# Patient Record
Sex: MALE | Race: WHITE | NOT HISPANIC OR LATINO | Employment: OTHER | URBAN - METROPOLITAN AREA
[De-identification: names, ages, dates, MRNs, and addresses within clinical notes are randomized per-mention and may not be internally consistent; named-entity substitution may affect disease eponyms.]

---

## 2017-01-10 ENCOUNTER — OFFICE VISIT (OUTPATIENT)
Dept: FAMILY MEDICINE CLINIC | Facility: CLINIC | Age: 82
End: 2017-01-10

## 2017-01-10 VITALS
RESPIRATION RATE: 20 BRPM | DIASTOLIC BLOOD PRESSURE: 100 MMHG | TEMPERATURE: 99.1 F | WEIGHT: 235.6 LBS | HEIGHT: 74 IN | BODY MASS INDEX: 30.24 KG/M2 | HEART RATE: 88 BPM | SYSTOLIC BLOOD PRESSURE: 170 MMHG

## 2017-01-10 DIAGNOSIS — M17.0 OSTEOARTHRITIS OF BOTH KNEES, UNSPECIFIED OSTEOARTHRITIS TYPE: ICD-10-CM

## 2017-01-10 DIAGNOSIS — R20.0 NUMBNESS AND TINGLING OF HAND: ICD-10-CM

## 2017-01-10 DIAGNOSIS — I10 ESSENTIAL HYPERTENSION: ICD-10-CM

## 2017-01-10 DIAGNOSIS — C43.59 MALIGNANT MELANOMA OF TORSO EXCLUDING BREAST (HCC): ICD-10-CM

## 2017-01-10 DIAGNOSIS — R20.2 NUMBNESS AND TINGLING OF HAND: ICD-10-CM

## 2017-01-10 DIAGNOSIS — I50.9 CONGESTIVE HEART FAILURE, UNSPECIFIED CONGESTIVE HEART FAILURE CHRONICITY, UNSPECIFIED CONGESTIVE HEART FAILURE TYPE: ICD-10-CM

## 2017-01-10 DIAGNOSIS — C44.91 BASAL CELL CARCINOMA: ICD-10-CM

## 2017-01-10 DIAGNOSIS — I82.4Y1 DEEP VEIN THROMBOSIS (DVT) OF PROXIMAL VEIN OF RIGHT LOWER EXTREMITY, UNSPECIFIED CHRONICITY (HCC): ICD-10-CM

## 2017-01-10 DIAGNOSIS — L84 PRE-ULCERATIVE CORN OR CALLOUS: ICD-10-CM

## 2017-01-10 DIAGNOSIS — Z76.89 ENCOUNTER TO ESTABLISH CARE: Primary | ICD-10-CM

## 2017-01-10 PROCEDURE — 99204 OFFICE O/P NEW MOD 45 MIN: CPT | Performed by: NURSE PRACTITIONER

## 2017-01-10 RX ORDER — FUROSEMIDE 20 MG/1
20 TABLET ORAL 2 TIMES DAILY
Qty: 120 TABLET | Refills: 1 | Status: SHIPPED | OUTPATIENT
Start: 2017-01-10 | End: 2017-06-06 | Stop reason: SDUPTHER

## 2017-01-10 RX ORDER — SIMVASTATIN 5 MG
5 TABLET ORAL NIGHTLY
Qty: 90 TABLET | Refills: 1 | Status: SHIPPED | OUTPATIENT
Start: 2017-01-10 | End: 2017-06-06 | Stop reason: SDUPTHER

## 2017-01-10 RX ORDER — ASPIRIN 81 MG/1
81 TABLET ORAL DAILY
COMMUNITY
End: 2017-01-10 | Stop reason: SDUPTHER

## 2017-01-10 RX ORDER — FUROSEMIDE 20 MG/1
20 TABLET ORAL 2 TIMES DAILY
COMMUNITY
End: 2017-01-10 | Stop reason: SDUPTHER

## 2017-01-10 RX ORDER — ACETAMINOPHEN 500 MG
500 TABLET ORAL EVERY 6 HOURS PRN
COMMUNITY
End: 2017-05-18

## 2017-01-10 RX ORDER — ASPIRIN 81 MG/1
81 TABLET ORAL DAILY
Qty: 90 TABLET | Refills: 1 | Status: SHIPPED | OUTPATIENT
Start: 2017-01-10 | End: 2017-05-18

## 2017-01-10 RX ORDER — SIMVASTATIN 5 MG
5 TABLET ORAL NIGHTLY
COMMUNITY
End: 2017-01-10 | Stop reason: SDUPTHER

## 2017-01-10 NOTE — PROGRESS NOTES
Subjective   Calderon Scott is a 84 y.o. male.     History of Present Illness   NP to establish care  85 yo male accompanied by his son just moved from [place], living with niece    HTN, CHF, HLP, hx of DVT RLE, hyperglycemia, osteoarthritis (chronic knee pains) melanoma, basal cell carcinoma, callous on right foot  Weight loss of 200 lbs over the past year, intentional with dietary changes    Needs referral to Podiatrist, Dermatologist, Orthopedist  Numbness in 1,2,3 digits of right hand, trouble holding onto things, not sure of cause  No accident or injury no wrist pain or elbow pain no neck pain  HTN  Stable on meds but has run out of some of his BP meds  Takes Lasix for CHF has resolved according to son and pt was told did not need to see Cardiologist any longer since lost weight    DVT in RLE resolved, no longer on Coumadin, says he didn't tolerate it the med made him have dizziness and falling spells, takes aspirin daily    Skin cancer: Basal cell and melanoma on chest    Bilateral knee pain, bone on bone, needs knee replacement but was told would not do surgery without him losing weight     The following portions of the patient's history were reviewed and updated as appropriate: allergies, current medications, past family history, past medical history, past social history, past surgical history and problem list.    Review of Systems   Constitutional: Negative for activity change and unexpected weight change.   HENT: Negative.    Eyes: Negative.  Negative for visual disturbance.   Respiratory: Negative.  Negative for cough, chest tightness and shortness of breath.    Cardiovascular: Negative.  Negative for chest pain, palpitations and leg swelling.   Gastrointestinal: Negative.    Endocrine: Negative.    Genitourinary: Negative.    Musculoskeletal: Positive for arthralgias and gait problem.   Skin: Positive for color change.   Allergic/Immunologic: Negative.    Neurological: Positive for numbness (right  hand). Negative for dizziness, light-headedness and headaches.   Hematological: Negative.    Psychiatric/Behavioral: Negative.  Negative for confusion, decreased concentration and sleep disturbance.       Objective   Physical Exam   Constitutional: He is oriented to person, place, and time. He appears well-developed and well-nourished. He does not appear ill. No distress.   HENT:   Head: Normocephalic.   Right Ear: Hearing and external ear normal.   Left Ear: Hearing and external ear normal.   Nose: Nose normal.   Eyes: Conjunctivae and lids are normal. Pupils are equal, round, and reactive to light.   Neck: Normal range of motion. Neck supple. No JVD present. Carotid bruit is not present. No thyroid mass and no thyromegaly present.   Cardiovascular: Normal rate, regular rhythm, S1 normal, S2 normal, normal heart sounds and intact distal pulses.    Pulmonary/Chest: Effort normal and breath sounds normal.   Abdominal: Soft.   Musculoskeletal: He exhibits no edema, tenderness or deformity.        Right knee: He exhibits no swelling.        Left knee: He exhibits no swelling.   Lymphadenopathy:     He has no cervical adenopathy.   Neurological: He is alert and oriented to person, place, and time. He has normal reflexes.   Skin: Skin is warm, dry and intact. No cyanosis. Nails show no clubbing.   Psychiatric: He has a normal mood and affect. His behavior is normal. Judgment and thought content normal.   Nursing note and vitals reviewed.      Assessment/Plan   Calderon was seen today for establish care.    Diagnoses and all orders for this visit:    Encounter to establish care    Essential hypertension  -     furosemide (LASIX) 20 MG tablet; Take 1 tablet by mouth 2 (Two) Times a Day.  -     metoprolol tartrate (LOPRESSOR) 25 MG tablet; Take 1 tablet by mouth 2 (Two) Times a Day.    Congestive heart failure, unspecified congestive heart failure chronicity, unspecified congestive heart failure type    Osteoarthritis of both  knees, unspecified osteoarthritis type  -     Ambulatory Referral to Orthopedic Surgery    Basal cell carcinoma    Malignant melanoma of torso excluding breast    Numbness and tingling of hand  -     EMG & Nerve Conduction Test; Future    Deep vein thrombosis (DVT) of proximal vein of right lower extremity, unspecified chronicity    Pre-ulcerative corn or callous  -     Ambulatory Referral to Podiatry    Other orders  -     simvastatin (ZOCOR) 5 MG tablet; Take 1 tablet by mouth Every Night.  -     aspirin 81 MG EC tablet; Take 1 tablet by mouth Daily.        Will make referrals to Orthopedic and Podiatry  Will refer to Dermatology at next visit  Will refill BP meds  Will refer pt for EMG/NCT of right upper extremity  Will request records from previous PCP   F/u 3 months and will do labs at that time

## 2017-01-10 NOTE — MR AVS SNAPSHOT
Calderon Scott   1/10/2017 12:00 PM   Office Visit    Dept Phone:  [phone]   Encounter #:  [id number]    Provider:  BAL Ornelas   Department:  [facility]                Your Full Care Plan              Where to Get Your Medications      These medications were sent to [facility]/pharmacy #[id number] - [place], [place] - [address] [place] [address] AT CORNER OF [facility] - [phone]  - [phone] [address] [address]    Hours:  24-hours Phone:  [phone]     aspirin 81 MG EC tablet    furosemide 20 MG tablet    metoprolol tartrate 25 MG tablet    simvastatin 5 MG tablet            Your Updated Medication List          This list is accurate as of: 1/10/17 12:55 PM.  Always use your most recent med list.                acetaminophen 500 MG tablet   Commonly known as:  TYLENOL       aspirin 81 MG EC tablet   Take 1 tablet by mouth Daily.       CALCIUM 600 + D PO       furosemide 20 MG tablet   Commonly known as:  LASIX   Take 1 tablet by mouth 2 (Two) Times a Day.       GLUCOSAMINE CHONDR 1500 COMPLX PO       metoprolol tartrate 25 MG tablet   Commonly known as:  LOPRESSOR   Take 1 tablet by mouth 2 (Two) Times a Day.       MULTIVITAMIN ADULT PO       simvastatin 5 MG tablet   Commonly known as:  ZOCOR   Take 1 tablet by mouth Every Night.               We Performed the Following     Ambulatory Referral to Orthopedic Surgery     Ambulatory Referral to Podiatry       You Were Diagnosed With        Codes Comments    Encounter to establish care    -  Primary ICD-10-CM: Z76.89  ICD-9-CM: V65.8     Essential hypertension     ICD-10-CM: I10  ICD-9-CM: 401.9     Osteoarthritis of both knees, unspecified osteoarthritis type     ICD-10-CM: M17.0  ICD-9-CM: 715.96     Basal cell carcinoma     ICD-10-CM: C44.91  ICD-9-CM: 173.91     Malignant melanoma of torso excluding breast     ICD-10-CM: C43.59  ICD-9-CM: 172.5     Deep vein thrombosis (DVT) of  "proximal vein of right lower extremity, unspecified chronicity     ICD-10-CM: I82.4Y1  ICD-9-CM: 453.41     Numbness and tingling of hand     ICD-10-CM: R20.2, R20.0  ICD-9-CM: 782.0     Pre-ulcerative corn or callous     ICD-10-CM: L84  ICD-9-CM: 700       Instructions     None    Patient Instructions History      Upcoming Appointments     Visit Type Date Time Department    NEW PATIENT 1/10/2017 12:00 PM [facility]    FOLLOW UP 2017 11:30 AM [facility]      LDL Technology Signup     [facility] LDL Technology allows you to send messages to your doctor, view your test results, renew your prescriptions, schedule appointments, and more. To sign up, go to [employer] and click on the Sign Up Now link in the New User? box. Enter your [employer] Technology Activation Code exactly as it appears below along with the last four digits of your Social Security Number and your Date of Birth () to complete the sign-up process. If you do not sign up before the expiration date, you must request a new code.    [employer] Technology Activation Code: [email or web address]  Expires: 2017 12:54 PM    If you have questions, you can email [email or web address] or call [phone] to talk to our LDL Technology staff. Remember, LDL Technology is NOT to be used for urgent needs. For medical emergencies, dial 911.               Other Info from Your Visit           Your Appointments     2017 11:30 AM EDT   Follow Up with BAL Ornelas   [facility] (--)    [address]  [facility] [address] [phone]           Arrive 15 minutes prior to appointment.              Allergies     Penicillins        Reason for Visit     Establish Care He just moved here from [place]      Vital Signs     Blood Pressure Pulse Temperature Respirations Height Weight    170/100 88 99.1 °F (37.3 °C) 20 74\" (188 cm) 235 lb 9.6 oz (107 kg)    Body Mass Index Smoking Status                30.25 kg/m2 Never Assessed "          Problems and Diagnoses Noted     Encounter to establish care    -  Primary    High blood pressure        Osteoarthritis of both knees, unspecified osteoarthritis type        Skin cancer        Malignant melanoma of torso excluding breast        Deep vein thrombosis (DVT) of proximal vein of right lower extremity, unspecified chronicity        Numbness and tingling of hand        Pre-ulcerative corn or callous

## 2017-01-17 ENCOUNTER — PROCEDURE VISIT (OUTPATIENT)
Dept: NEUROLOGY | Facility: CLINIC | Age: 82
End: 2017-01-17

## 2017-01-17 DIAGNOSIS — G56.01 RIGHT CARPAL TUNNEL SYNDROME: ICD-10-CM

## 2017-01-17 PROCEDURE — 95908 NRV CNDJ TST 3-4 STUDIES: CPT | Performed by: PSYCHIATRY & NEUROLOGY

## 2017-01-17 PROCEDURE — 95886 MUSC TEST DONE W/N TEST COMP: CPT | Performed by: PSYCHIATRY & NEUROLOGY

## 2017-01-17 NOTE — MR AVS SNAPSHOT
Calderon Scott   2017 2:00 PM   Procedure visit    Dept Phone:  [phone]   Encounter #:  [id number]    Provider:  Jesus Mcmahan MD   Department:  [facility] NEUROLOGY                Your Full Care Plan              Your Updated Medication List          This list is accurate as of: 17  3:11 PM.  Always use your most recent med list.                acetaminophen 500 MG tablet   Commonly known as:  TYLENOL       aspirin 81 MG EC tablet   Take 1 tablet by mouth Daily.       CALCIUM 600 + D PO       furosemide 20 MG tablet   Commonly known as:  LASIX   Take 1 tablet by mouth 2 (Two) Times a Day.       GLUCOSAMINE CHONDR 1500 COMPLX PO       metoprolol tartrate 25 MG tablet   Commonly known as:  LOPRESSOR   Take 1 tablet by mouth 2 (Two) Times a Day.       MULTIVITAMIN ADULT PO       simvastatin 5 MG tablet   Commonly known as:  ZOCOR   Take 1 tablet by mouth Every Night.               You Were Diagnosed With        Codes Comments    Right carpal tunnel syndrome     ICD-10-CM: G56.01  ICD-9-CM: 354.0       Instructions     None    Patient Instructions History      Upcoming Appointments     Visit Type Date Time Department    EMG 2017  2:00 PM [facility]    FOLLOW UP 2017 11:30 AM [facility]      Easy Ice Signup     [facility] Easy Ice allows you to send messages to your doctor, view your test results, renew your prescriptions, schedule appointments, and more. To sign up, go to [employer] and click on the Sign Up Now link in the New User? box. Enter your [employer] Activation Code exactly as it appears below along with the last four digits of your Social Security Number and your Date of Birth () to complete the sign-up process. If you do not sign up before the expiration date, you must request a new code.    [employer] Activation Code: [email or web address]  Expires: 2017 12:54 PM    If you have questions, you can  email [email or web address] or call [phone] to talk to our MyChart staff. Remember, "[employer]hart is NOT to be used for urgent needs. For medical emergencies, dial 911.               Other Info from Your Visit           Your Appointments     Apr 11, 2017 11:30 AM EDT   Follow Up with BAL Ornelas   [facility] (--)    [address] [facility] [address]  [facility] [address] [phone]           Arrive 15 minutes prior to appointment.              Allergies     Penicillins        Vital Signs     Smoking Status                   Never Assessed           Problems and Diagnoses Noted     Carpal tunnel syndrome on right

## 2017-05-17 ENCOUNTER — TELEPHONE (OUTPATIENT)
Dept: FAMILY MEDICINE CLINIC | Facility: CLINIC | Age: 82
End: 2017-05-17

## 2017-05-18 ENCOUNTER — OFFICE VISIT (OUTPATIENT)
Dept: FAMILY MEDICINE CLINIC | Facility: CLINIC | Age: 82
End: 2017-05-18

## 2017-05-18 ENCOUNTER — TELEPHONE (OUTPATIENT)
Dept: FAMILY MEDICINE CLINIC | Facility: CLINIC | Age: 82
End: 2017-05-18

## 2017-05-18 VITALS
DIASTOLIC BLOOD PRESSURE: 70 MMHG | BODY MASS INDEX: 36.45 KG/M2 | HEIGHT: 74 IN | TEMPERATURE: 98.5 F | RESPIRATION RATE: 24 BRPM | HEART RATE: 76 BPM | SYSTOLIC BLOOD PRESSURE: 120 MMHG | WEIGHT: 284 LBS

## 2017-05-18 DIAGNOSIS — I48.20 CHRONIC A-FIB (HCC): ICD-10-CM

## 2017-05-18 DIAGNOSIS — I10 ESSENTIAL HYPERTENSION: Primary | ICD-10-CM

## 2017-05-18 DIAGNOSIS — M48.061 SPINAL STENOSIS OF LUMBAR REGION: ICD-10-CM

## 2017-05-18 DIAGNOSIS — I50.9 CONGESTIVE HEART FAILURE, UNSPECIFIED CONGESTIVE HEART FAILURE CHRONICITY, UNSPECIFIED CONGESTIVE HEART FAILURE TYPE: ICD-10-CM

## 2017-05-18 DIAGNOSIS — R29.898 WEAKNESS OF LEFT LOWER EXTREMITY: ICD-10-CM

## 2017-05-18 PROBLEM — Z76.89 ENCOUNTER TO ESTABLISH CARE: Status: RESOLVED | Noted: 2017-01-10 | Resolved: 2017-05-18

## 2017-05-18 LAB
ALBUMIN SERPL-MCNC: 2.7 G/DL (ref 3.2–4.8)
ALBUMIN/GLOB SERPL: 0.9 G/DL (ref 1.5–2.5)
ALP SERPL-CCNC: 86 U/L (ref 25–100)
ALT SERPL-CCNC: 19 U/L (ref 7–40)
AST SERPL-CCNC: 30 U/L (ref 0–33)
BASOPHILS # BLD AUTO: 0.02 10*3/MM3 (ref 0–0.2)
BASOPHILS NFR BLD AUTO: 0.2 % (ref 0–1)
BILIRUB SERPL-MCNC: 0.6 MG/DL (ref 0.3–1.2)
BUN SERPL-MCNC: 22 MG/DL (ref 9–23)
BUN/CREAT SERPL: 31.4 (ref 7–25)
CALCIUM SERPL-MCNC: 8.5 MG/DL (ref 8.7–10.4)
CHLORIDE SERPL-SCNC: 99 MMOL/L (ref 99–109)
CO2 SERPL-SCNC: 34 MMOL/L (ref 20–31)
CREAT SERPL-MCNC: 0.7 MG/DL (ref 0.6–1.3)
EOSINOPHIL # BLD AUTO: 0.04 10*3/MM3 (ref 0.1–0.3)
EOSINOPHIL NFR BLD AUTO: 0.5 % (ref 0–3)
ERYTHROCYTE [DISTWIDTH] IN BLOOD BY AUTOMATED COUNT: 18.3 % (ref 11.3–14.5)
GLOBULIN SER CALC-MCNC: 2.9 GM/DL
GLUCOSE SERPL-MCNC: 127 MG/DL (ref 70–100)
HCT VFR BLD AUTO: 34.8 % (ref 38.9–50.9)
HGB BLD-MCNC: 10.5 G/DL (ref 13.1–17.5)
IMM GRANULOCYTES # BLD: 0.04 10*3/MM3 (ref 0–0.03)
IMM GRANULOCYTES NFR BLD: 0.5 % (ref 0–0.6)
LYMPHOCYTES # BLD AUTO: 0.82 10*3/MM3 (ref 0.6–4.8)
LYMPHOCYTES NFR BLD AUTO: 9.4 % (ref 24–44)
MCH RBC QN AUTO: 24.5 PG (ref 27–31)
MCHC RBC AUTO-ENTMCNC: 30.2 G/DL (ref 32–36)
MCV RBC AUTO: 81.3 FL (ref 80–99)
MONOCYTES # BLD AUTO: 1 10*3/MM3 (ref 0–1)
MONOCYTES NFR BLD AUTO: 11.5 % (ref 0–12)
NEUTROPHILS # BLD AUTO: 6.8 10*3/MM3 (ref 1.5–8.3)
NEUTROPHILS NFR BLD AUTO: 77.9 % (ref 41–71)
PLATELET # BLD AUTO: 202 10*3/MM3 (ref 150–450)
POTASSIUM SERPL-SCNC: 4 MMOL/L (ref 3.5–5.5)
PROT SERPL-MCNC: 5.6 G/DL (ref 5.7–8.2)
RBC # BLD AUTO: 4.28 10*6/MM3 (ref 4.2–5.76)
SODIUM SERPL-SCNC: 134 MMOL/L (ref 132–146)
WBC # BLD AUTO: 8.72 10*3/MM3 (ref 3.5–10.8)

## 2017-05-18 PROCEDURE — 99214 OFFICE O/P EST MOD 30 MIN: CPT | Performed by: NURSE PRACTITIONER

## 2017-05-18 RX ORDER — OXYCODONE HYDROCHLORIDE 5 MG/1
TABLET ORAL
COMMUNITY
Start: 2017-05-11 | End: 2017-06-20

## 2017-05-18 RX ORDER — METOPROLOL TARTRATE 50 MG/1
25 TABLET, FILM COATED ORAL 2 TIMES DAILY
COMMUNITY
Start: 2017-05-11 | End: 2017-06-06 | Stop reason: SDUPTHER

## 2017-05-18 RX ORDER — RIVAROXABAN 20 MG/1
20 TABLET, FILM COATED ORAL DAILY
COMMUNITY
Start: 2017-05-11 | End: 2017-06-06 | Stop reason: SDUPTHER

## 2017-05-18 RX ORDER — MELATONIN
1000 2 TIMES DAILY
COMMUNITY
End: 2017-06-06 | Stop reason: SDUPTHER

## 2017-05-18 RX ORDER — METHENAMINE HIPPURATE 1000 MG/1
1 TABLET ORAL 2 TIMES DAILY
COMMUNITY
Start: 2017-05-11 | End: 2017-06-06 | Stop reason: SDUPTHER

## 2017-05-18 RX ORDER — NAPROXEN 500 MG/1
TABLET ORAL
COMMUNITY
Start: 2017-05-11 | End: 2017-05-18

## 2017-05-18 RX ORDER — GABAPENTIN 400 MG/1
1 CAPSULE ORAL 3 TIMES DAILY
COMMUNITY
Start: 2017-05-11 | End: 2017-06-06 | Stop reason: SDUPTHER

## 2017-05-18 RX ORDER — TAMSULOSIN HYDROCHLORIDE 0.4 MG/1
0.4 CAPSULE ORAL DAILY
COMMUNITY
Start: 2017-05-11 | End: 2017-06-06 | Stop reason: SDUPTHER

## 2017-05-18 RX ORDER — LISINOPRIL 10 MG/1
10 TABLET ORAL DAILY
COMMUNITY
Start: 2017-05-11 | End: 2017-06-06 | Stop reason: SDUPTHER

## 2017-05-18 RX ORDER — PEPSIN/PAN ENZYME/BETAINE
1 TABLET ORAL DAILY
COMMUNITY

## 2017-05-18 RX ORDER — FAMOTIDINE 20 MG/1
20 TABLET, FILM COATED ORAL DAILY
COMMUNITY
Start: 2017-05-11 | End: 2017-06-06 | Stop reason: SDUPTHER

## 2017-05-18 RX ORDER — CALCIUM CARBONATE 200(500)MG
1 TABLET,CHEWABLE ORAL DAILY
COMMUNITY

## 2017-05-18 RX ORDER — CITALOPRAM 20 MG/1
20 TABLET ORAL ONCE
COMMUNITY
Start: 2017-05-11 | End: 2017-06-06 | Stop reason: SDUPTHER

## 2017-05-18 RX ORDER — FINASTERIDE 5 MG/1
5 TABLET, FILM COATED ORAL DAILY
COMMUNITY
Start: 2017-05-11 | End: 2017-06-06 | Stop reason: SDUPTHER

## 2017-05-18 RX ORDER — POTASSIUM CHLORIDE 750 MG/1
10 TABLET, FILM COATED, EXTENDED RELEASE ORAL 2 TIMES DAILY
COMMUNITY
Start: 2017-05-11 | End: 2017-06-06 | Stop reason: SDUPTHER

## 2017-05-19 ENCOUNTER — TELEPHONE (OUTPATIENT)
Dept: FAMILY MEDICINE CLINIC | Facility: CLINIC | Age: 82
End: 2017-05-19

## 2017-05-23 ENCOUNTER — TELEPHONE (OUTPATIENT)
Dept: FAMILY MEDICINE CLINIC | Facility: CLINIC | Age: 82
End: 2017-05-23

## 2017-05-25 ENCOUNTER — TELEPHONE (OUTPATIENT)
Dept: FAMILY MEDICINE CLINIC | Facility: CLINIC | Age: 82
End: 2017-05-25

## 2017-05-31 RX ORDER — CIPROFLOXACIN 500 MG/1
500 TABLET, FILM COATED ORAL 2 TIMES DAILY
Qty: 28 TABLET | Refills: 0 | Status: SHIPPED | OUTPATIENT
Start: 2017-05-31 | End: 2017-08-02 | Stop reason: HOSPADM

## 2017-06-05 ENCOUNTER — TELEPHONE (OUTPATIENT)
Dept: FAMILY MEDICINE CLINIC | Facility: CLINIC | Age: 82
End: 2017-06-05

## 2017-06-05 DIAGNOSIS — I10 ESSENTIAL HYPERTENSION: ICD-10-CM

## 2017-06-05 NOTE — TELEPHONE ENCOUNTER
----- Message from Alicia Huffman sent at 6/5/2017 10:38 AM EDT -----  Contact: SHANNA DICKERSON  THE MEDICATION HE CAME HOME WITH FROM [facility] NEEDS REFILLS;    GRANDDAUGHTER SAID YOU HAD THE LIST OF MEDS, [facility] ONLY HAVE 1 MT. AND HE IS ABOUT OUT OF THOSE    ALSO SHE IS THE ONE THAT NEEDS TO BE CALLED    JOSÉ ANTONIO [phone]

## 2017-06-06 RX ORDER — FUROSEMIDE 20 MG/1
20 TABLET ORAL 2 TIMES DAILY
Qty: 120 TABLET | Refills: 1 | Status: SHIPPED | OUTPATIENT
Start: 2017-06-06 | End: 2017-08-02 | Stop reason: HOSPADM

## 2017-06-06 RX ORDER — SIMVASTATIN 5 MG
5 TABLET ORAL NIGHTLY
Qty: 90 TABLET | Refills: 1 | Status: SHIPPED | OUTPATIENT
Start: 2017-06-06 | End: 2017-08-11

## 2017-06-06 RX ORDER — MELATONIN
1000 2 TIMES DAILY
Qty: 180 TABLET | Refills: 1 | Status: SHIPPED | OUTPATIENT
Start: 2017-06-06 | End: 2017-12-12 | Stop reason: SDUPTHER

## 2017-06-06 RX ORDER — LISINOPRIL 10 MG/1
10 TABLET ORAL DAILY
Qty: 90 TABLET | Refills: 1 | Status: SHIPPED | OUTPATIENT
Start: 2017-06-06 | End: 2017-08-02 | Stop reason: HOSPADM

## 2017-06-06 RX ORDER — FAMOTIDINE 20 MG/1
20 TABLET, FILM COATED ORAL DAILY
Qty: 90 TABLET | Refills: 1 | Status: SHIPPED | OUTPATIENT
Start: 2017-06-06 | End: 2017-12-12 | Stop reason: SDUPTHER

## 2017-06-06 RX ORDER — TAMSULOSIN HYDROCHLORIDE 0.4 MG/1
0.4 CAPSULE ORAL DAILY
Qty: 90 CAPSULE | Refills: 1 | Status: SHIPPED | OUTPATIENT
Start: 2017-06-06 | End: 2017-12-12 | Stop reason: SDUPTHER

## 2017-06-06 RX ORDER — CITALOPRAM 20 MG/1
20 TABLET ORAL ONCE
Qty: 90 TABLET | Refills: 1 | Status: SHIPPED | OUTPATIENT
Start: 2017-06-06 | End: 2017-06-06

## 2017-06-06 RX ORDER — METOPROLOL TARTRATE 50 MG/1
50 TABLET, FILM COATED ORAL 2 TIMES DAILY
Qty: 180 TABLET | Refills: 1 | Status: SHIPPED | OUTPATIENT
Start: 2017-06-06 | End: 2017-12-12 | Stop reason: SDUPTHER

## 2017-06-06 RX ORDER — POTASSIUM CHLORIDE 750 MG/1
10 TABLET, FILM COATED, EXTENDED RELEASE ORAL 2 TIMES DAILY
Qty: 180 TABLET | Refills: 1 | Status: SHIPPED | OUTPATIENT
Start: 2017-06-06 | End: 2017-07-28 | Stop reason: SDUPTHER

## 2017-06-06 RX ORDER — METHENAMINE HIPPURATE 1000 MG/1
1 TABLET ORAL 2 TIMES DAILY
Qty: 180 TABLET | Refills: 1 | Status: SHIPPED | OUTPATIENT
Start: 2017-06-06

## 2017-06-06 RX ORDER — RIVAROXABAN 20 MG/1
20 TABLET, FILM COATED ORAL DAILY
Qty: 90 TABLET | Refills: 1 | Status: SHIPPED | OUTPATIENT
Start: 2017-06-06 | End: 2018-01-30 | Stop reason: SDUPTHER

## 2017-06-06 RX ORDER — GABAPENTIN 400 MG/1
400 CAPSULE ORAL 3 TIMES DAILY
Qty: 270 CAPSULE | Refills: 1 | Status: ON HOLD | OUTPATIENT
Start: 2017-06-06 | End: 2018-05-28

## 2017-06-06 RX ORDER — FINASTERIDE 5 MG/1
5 TABLET, FILM COATED ORAL DAILY
Qty: 90 TABLET | Refills: 1 | Status: SHIPPED | OUTPATIENT
Start: 2017-06-06 | End: 2017-12-12 | Stop reason: SDUPTHER

## 2017-06-12 ENCOUNTER — TELEPHONE (OUTPATIENT)
Dept: FAMILY MEDICINE CLINIC | Facility: CLINIC | Age: 82
End: 2017-06-12

## 2017-06-12 NOTE — TELEPHONE ENCOUNTER
----- Message from Alicia Huffman sent at 6/12/2017  3:49 PM EDT -----  Contact: [facility] CALLED TO INFORM THAT PATIENT WILL BE DISCHARGED FROM HOME HEALTH SERVICES FOR [facility] AS OF 6/4/17 DUE TO INSURANCE DENIAL    [phone]

## 2017-06-20 ENCOUNTER — TELEPHONE (OUTPATIENT)
Dept: FAMILY MEDICINE CLINIC | Facility: CLINIC | Age: 82
End: 2017-06-20

## 2017-06-20 DIAGNOSIS — M17.0 OSTEOARTHRITIS OF BOTH KNEES, UNSPECIFIED OSTEOARTHRITIS TYPE: Primary | ICD-10-CM

## 2017-06-20 DIAGNOSIS — M48.00 SPINAL STENOSIS, UNSPECIFIED SPINAL REGION: ICD-10-CM

## 2017-06-20 RX ORDER — OXYCODONE AND ACETAMINOPHEN 10; 325 MG/1; MG/1
TABLET ORAL
Qty: 30 TABLET | Refills: 0 | Status: SHIPPED | OUTPATIENT
Start: 2017-06-20 | End: 2017-07-28 | Stop reason: SDUPTHER

## 2017-06-20 NOTE — TELEPHONE ENCOUNTER
Spoke with  nurse Flaca; she will notify grand daughter Nya that pt may need to go to ER for evaluation due to swelling of upper and lower extremities. [facility]

## 2017-06-20 NOTE — TELEPHONE ENCOUNTER
----- Message from Kristin Sanabria sent at 6/20/2017  3:08 PM EDT -----  Contact: DR. IRVIN; HOME HEALTH CALLED   [facility] CALLED ABOUT [facility].SHE JUST GOT THERE AND HIS RIGHT ARM IS SWOLLEN AND RED. IT ISN'T WARM TO TOUCH BUT SHE CAN SEE THE SHININESS OF THE FLUID WITHIN HIS ARM. IT HAS GOT WORSE OVER THE WEEKEND. IT IS FROM HIS WRIST TO HIS SHOULDER. HE IS STILL HAVING SWELLING IN HIS FEET AND LEGS AS WELL.       CALL BACK [phone]

## 2017-06-20 NOTE — TELEPHONE ENCOUNTER
Please call.  Okay to .  I will give 30 at this time.  I have not seen him before however.  Please run Josesito.  Reviewed notes in chart.

## 2017-06-20 NOTE — TELEPHONE ENCOUNTER
Pt will need an appointment to be seen to evaluated and to have labs for the swelling.   Would recommend that he see Dr Ribeiro so he can become acquainted with him as he has been ordering some of his medications. [facility]

## 2017-06-20 NOTE — TELEPHONE ENCOUNTER
----- Message from Ximena Garcia MA sent at 6/20/2017 11:03 AM EDT -----  PER NOTE IN CHART ON 06/12/2017 WE REC'D CALL FROM  STATING PT WAS BEING DISCHARGED DUE TO INS. SPOKE WITH GRANDDAUGHTER WHO IS CARE GIVER AND SHE STATES THAT HE HAS APPT WITH UROLOGY TOMORROW AND WHEN HE WAS DISCHARGED FROM [facility] HE WAS GIVEN ONE MONTH SUPPLY OF ALL RX'S AND HE IS ALMOST OUT OF HIS PAIN MED WHICH IS OXYCODONE 10/325 PT TAKES 2 TIMES DAILY AND PRN. GRANDDAUGHTER ASKING IF YOU CAN REFILL THIS FOR HIM.

## 2017-06-21 DIAGNOSIS — R31.9 HEMATURIA: Primary | ICD-10-CM

## 2017-06-21 DIAGNOSIS — Z97.8 CHRONIC INDWELLING FOLEY CATHETER: ICD-10-CM

## 2017-06-21 DIAGNOSIS — R33.9 URINARY RETENTION: ICD-10-CM

## 2017-06-21 NOTE — TELEPHONE ENCOUNTER
SPOKE WITH GRANDDAUGHTER AND INFORMED HER OF RX AND OFFICE HRS GIVEN.   SHE STATES PT WENT TO ER THEY DONE LABS AND TEST AND STATES THAT THEY SAID NOTHING WAS FOUND ON U/S OF ARM AND THIS IS JUST EDEMA. REQUESTING INFO. SENDING THIS TO DR IRVIN SINCE HE SEEMS TO BE THE ONE HANDLING THIS PT AS HE HASN'T EVER SEEN HIM. INFORMED GRANDDAUGHTER PT SHOULD BE SEEN BY DR IRVIN AND SHE STATES THAT HE ISN'T MOBILE AND HH COMES THEY WILL SEE PT ON Friday AND SHE ALSO ASK IF LABS COULD BE ORDERED TO MAKE SURE PT MEDS ARE AT CORRECT DOSAGE. PLEASE ADVISE.

## 2017-06-21 NOTE — TELEPHONE ENCOUNTER
Sandra, can you review and order any labs you see fit. I have never seen him and looks like they are not able to get him here at this time. bds

## 2017-06-21 NOTE — TELEPHONE ENCOUNTER
[facility] ER records reviewed. Pt is being treated for possible cellulitis of upper extremity with Keflex QID for 7 days, no DVT per US. Labs showed normal kidney function and electrolytes. No LFTs or CBC or albumin level was checked at recent ER visit. (H&H and albumin have been low in past but care giver has been giving him Iron supplement and making sure he is getting enough protein daily).    Discussed with grand daughter Nya today that  Swelling of upper and lower extremities maybe due to CHF or due to low albumin levels or pt lack of mobility/venous stasis. Keep legs elevated as well as arms propped on pillows when sitting will help.     It is important that he keep appt with Cardiologist   Dr. Trujillo on 6/28/17 to have evaluation of Afib and CHF/swelling. She agrees to see to it that he keeps this appt and makes a follow up with Dr Ribeiro at some point for a face to face.     She will call back if needed. [facility]

## 2017-06-30 ENCOUNTER — TELEPHONE (OUTPATIENT)
Dept: FAMILY MEDICINE CLINIC | Facility: CLINIC | Age: 82
End: 2017-06-30

## 2017-07-05 ENCOUNTER — TELEPHONE (OUTPATIENT)
Dept: FAMILY MEDICINE CLINIC | Facility: CLINIC | Age: 82
End: 2017-07-05

## 2017-07-05 DIAGNOSIS — I50.9 CONGESTIVE HEART FAILURE, UNSPECIFIED CONGESTIVE HEART FAILURE CHRONICITY, UNSPECIFIED CONGESTIVE HEART FAILURE TYPE: Primary | ICD-10-CM

## 2017-07-05 DIAGNOSIS — I10 ESSENTIAL HYPERTENSION: ICD-10-CM

## 2017-07-05 DIAGNOSIS — I48.20 CHRONIC A-FIB (HCC): ICD-10-CM

## 2017-07-24 ENCOUNTER — TELEPHONE (OUTPATIENT)
Dept: FAMILY MEDICINE CLINIC | Facility: CLINIC | Age: 82
End: 2017-07-24

## 2017-07-24 NOTE — TELEPHONE ENCOUNTER
OK to irrigate catheter. Did not know it was an urgent issue. Did they want to change the catheter, if yes, ok to change. bds

## 2017-07-24 NOTE — TELEPHONE ENCOUNTER
----- Message from Alicia Huffman sent at 7/24/2017  4:42 PM EDT -----  Contact: [facility] CALLING RE:    HE IS HAVING PROBLEMS WITH THE CATATHER THEY HAVE IRRIGATED IT TODAY, SAT. AND Friday. . IT IS LEAKING HEAVILY    ASKING IF YOU WANT TO CHANGE THE ORDER    [phone]  JACK

## 2017-07-24 NOTE — TELEPHONE ENCOUNTER
Informed Calista that it is okay to change catheter and she requested if it was okay to change the size and Dr. Ribeiro gave verbal okay for that too.

## 2017-07-25 ENCOUNTER — TELEPHONE (OUTPATIENT)
Dept: FAMILY MEDICINE CLINIC | Facility: CLINIC | Age: 82
End: 2017-07-25

## 2017-07-25 NOTE — TELEPHONE ENCOUNTER
----- Message from Kristin Sanabria sent at 7/25/2017  3:27 PM EDT -----  Contact: DR. IRVIN; HOME HEALTH QUESTION   [facility] CALLED AND STATED THAT THE PT IS RUNNING A FEVER 99.6  BUT IT HAS GONE DOWN FROM LAST NIGHT ( 100.1) SHE WANTED TO KNOW IF A UA SHOULD BE DONE ON THE PT.     CALL BACK   [phone]   JOJO FROM [facility]

## 2017-07-28 ENCOUNTER — TELEPHONE (OUTPATIENT)
Dept: FAMILY MEDICINE CLINIC | Facility: CLINIC | Age: 82
End: 2017-07-28

## 2017-07-28 ENCOUNTER — HOSPITAL ENCOUNTER (OUTPATIENT)
Facility: HOSPITAL | Age: 82
Setting detail: OBSERVATION
Discharge: HOME HEALTH | End: 2017-08-02
Attending: EMERGENCY MEDICINE | Admitting: HOSPITALIST

## 2017-07-28 ENCOUNTER — APPOINTMENT (OUTPATIENT)
Dept: GENERAL RADIOLOGY | Facility: HOSPITAL | Age: 82
End: 2017-07-28

## 2017-07-28 DIAGNOSIS — E88.09 HYPOALBUMINEMIA: ICD-10-CM

## 2017-07-28 DIAGNOSIS — J90 PLEURAL EFFUSION, LEFT: ICD-10-CM

## 2017-07-28 DIAGNOSIS — M48.00 SPINAL STENOSIS, UNSPECIFIED SPINAL REGION: ICD-10-CM

## 2017-07-28 DIAGNOSIS — M17.0 OSTEOARTHRITIS OF BOTH KNEES, UNSPECIFIED OSTEOARTHRITIS TYPE: ICD-10-CM

## 2017-07-28 DIAGNOSIS — I50.9 CONGESTIVE HEART FAILURE, UNSPECIFIED CONGESTIVE HEART FAILURE CHRONICITY, UNSPECIFIED CONGESTIVE HEART FAILURE TYPE: ICD-10-CM

## 2017-07-28 DIAGNOSIS — Z74.09 IMPAIRED FUNCTIONAL MOBILITY, BALANCE, GAIT, AND ENDURANCE: ICD-10-CM

## 2017-07-28 DIAGNOSIS — N39.0 URINARY TRACT INFECTION ASSOCIATED WITH CATHETERIZATION OF URINARY TRACT, UNSPECIFIED INDWELLING URINARY CATHETER TYPE, INITIAL ENCOUNTER (HCC): Primary | ICD-10-CM

## 2017-07-28 DIAGNOSIS — T83.511A URINARY TRACT INFECTION ASSOCIATED WITH CATHETERIZATION OF URINARY TRACT, UNSPECIFIED INDWELLING URINARY CATHETER TYPE, INITIAL ENCOUNTER (HCC): Primary | ICD-10-CM

## 2017-07-28 LAB
ALBUMIN SERPL-MCNC: 2.8 G/DL (ref 3.2–4.8)
ALBUMIN/GLOB SERPL: 0.9 G/DL (ref 1.5–2.5)
ALP SERPL-CCNC: 137 U/L (ref 25–100)
ALT SERPL W P-5'-P-CCNC: 35 U/L (ref 7–40)
ANION GAP SERPL CALCULATED.3IONS-SCNC: 4 MMOL/L (ref 3–11)
AST SERPL-CCNC: 46 U/L (ref 0–33)
BACTERIA UR QL AUTO: ABNORMAL /HPF
BASOPHILS # BLD AUTO: 0.05 10*3/MM3 (ref 0–0.2)
BASOPHILS NFR BLD AUTO: 0.8 % (ref 0–1)
BILIRUB SERPL-MCNC: 0.3 MG/DL (ref 0.3–1.2)
BILIRUB UR QL STRIP: NEGATIVE
BUN BLD-MCNC: 30 MG/DL (ref 9–23)
BUN/CREAT SERPL: 50 (ref 7–25)
CALCIUM SPEC-SCNC: 7.9 MG/DL (ref 8.7–10.4)
CHLORIDE SERPL-SCNC: 103 MMOL/L (ref 99–109)
CLARITY UR: ABNORMAL
CO2 SERPL-SCNC: 29 MMOL/L (ref 20–31)
COLOR UR: YELLOW
CREAT BLD-MCNC: 0.6 MG/DL (ref 0.6–1.3)
D-LACTATE SERPL-SCNC: 1.3 MMOL/L (ref 0.5–2)
DEPRECATED RDW RBC AUTO: 58.2 FL (ref 37–54)
EOSINOPHIL # BLD AUTO: 0.08 10*3/MM3 (ref 0–0.3)
EOSINOPHIL NFR BLD AUTO: 1.3 % (ref 0–3)
ERYTHROCYTE [DISTWIDTH] IN BLOOD BY AUTOMATED COUNT: 19.7 % (ref 11.3–14.5)
GFR SERPL CREATININE-BSD FRML MDRD: 128 ML/MIN/1.73
GLOBULIN UR ELPH-MCNC: 3.1 GM/DL
GLUCOSE BLD-MCNC: 129 MG/DL (ref 70–100)
GLUCOSE UR STRIP-MCNC: NEGATIVE MG/DL
HCT VFR BLD AUTO: 41.8 % (ref 38.9–50.9)
HGB BLD-MCNC: 13.1 G/DL (ref 13.1–17.5)
HGB UR QL STRIP.AUTO: ABNORMAL
HYALINE CASTS UR QL AUTO: ABNORMAL /LPF
IMM GRANULOCYTES # BLD: 0.01 10*3/MM3 (ref 0–0.03)
IMM GRANULOCYTES NFR BLD: 0.2 % (ref 0–0.6)
KETONES UR QL STRIP: NEGATIVE
LEUKOCYTE ESTERASE UR QL STRIP.AUTO: ABNORMAL
LYMPHOCYTES # BLD AUTO: 1.42 10*3/MM3 (ref 0.6–4.8)
LYMPHOCYTES NFR BLD AUTO: 22.3 % (ref 24–44)
MCH RBC QN AUTO: 25 PG (ref 27–31)
MCHC RBC AUTO-ENTMCNC: 31.3 G/DL (ref 32–36)
MCV RBC AUTO: 79.8 FL (ref 80–99)
MONOCYTES # BLD AUTO: 0.8 10*3/MM3 (ref 0–1)
MONOCYTES NFR BLD AUTO: 12.6 % (ref 0–12)
NEUTROPHILS # BLD AUTO: 4 10*3/MM3 (ref 1.5–8.3)
NEUTROPHILS NFR BLD AUTO: 62.8 % (ref 41–71)
NITRITE UR QL STRIP: NEGATIVE
PH UR STRIP.AUTO: 6 [PH] (ref 5–8)
PLATELET # BLD AUTO: 232 10*3/MM3 (ref 150–450)
PMV BLD AUTO: 9.3 FL (ref 6–12)
POTASSIUM BLD-SCNC: 4.5 MMOL/L (ref 3.5–5.5)
PROCALCITONIN SERPL-MCNC: 0.06 NG/ML
PROT SERPL-MCNC: 5.9 G/DL (ref 5.7–8.2)
PROT UR QL STRIP: ABNORMAL
RBC # BLD AUTO: 5.24 10*6/MM3 (ref 4.2–5.76)
RBC # UR: ABNORMAL /HPF
REF LAB TEST METHOD: ABNORMAL
SODIUM BLD-SCNC: 136 MMOL/L (ref 132–146)
SP GR UR STRIP: 1.01 (ref 1–1.03)
SQUAMOUS #/AREA URNS HPF: ABNORMAL /HPF
UROBILINOGEN UR QL STRIP: ABNORMAL
WBC NRBC COR # BLD: 6.36 10*3/MM3 (ref 3.5–10.8)
WBC UR QL AUTO: ABNORMAL /HPF

## 2017-07-28 PROCEDURE — 84145 PROCALCITONIN (PCT): CPT | Performed by: PHYSICIAN ASSISTANT

## 2017-07-28 PROCEDURE — 83605 ASSAY OF LACTIC ACID: CPT | Performed by: PHYSICIAN ASSISTANT

## 2017-07-28 PROCEDURE — 99285 EMERGENCY DEPT VISIT HI MDM: CPT

## 2017-07-28 PROCEDURE — 85025 COMPLETE CBC W/AUTO DIFF WBC: CPT | Performed by: PHYSICIAN ASSISTANT

## 2017-07-28 PROCEDURE — 87186 SC STD MICRODIL/AGAR DIL: CPT | Performed by: PHYSICIAN ASSISTANT

## 2017-07-28 PROCEDURE — 81001 URINALYSIS AUTO W/SCOPE: CPT | Performed by: PHYSICIAN ASSISTANT

## 2017-07-28 PROCEDURE — 80053 COMPREHEN METABOLIC PANEL: CPT | Performed by: PHYSICIAN ASSISTANT

## 2017-07-28 PROCEDURE — 81003 URINALYSIS AUTO W/O SCOPE: CPT | Performed by: PHYSICIAN ASSISTANT

## 2017-07-28 PROCEDURE — 87040 BLOOD CULTURE FOR BACTERIA: CPT | Performed by: PHYSICIAN ASSISTANT

## 2017-07-28 PROCEDURE — 87086 URINE CULTURE/COLONY COUNT: CPT | Performed by: PHYSICIAN ASSISTANT

## 2017-07-28 PROCEDURE — 71010 HC CHEST PA OR AP: CPT

## 2017-07-28 PROCEDURE — 87077 CULTURE AEROBIC IDENTIFY: CPT | Performed by: PHYSICIAN ASSISTANT

## 2017-07-28 PROCEDURE — 51702 INSERT TEMP BLADDER CATH: CPT

## 2017-07-28 RX ORDER — OXYCODONE AND ACETAMINOPHEN 10; 325 MG/1; MG/1
TABLET ORAL
Qty: 18 TABLET | Refills: 0 | Status: SHIPPED | OUTPATIENT
Start: 2017-07-28 | End: 2017-08-08 | Stop reason: SDUPTHER

## 2017-07-28 RX ORDER — SODIUM CHLORIDE 0.9 % (FLUSH) 0.9 %
10 SYRINGE (ML) INJECTION AS NEEDED
Status: DISCONTINUED | OUTPATIENT
Start: 2017-07-28 | End: 2017-08-02 | Stop reason: HOSPADM

## 2017-07-28 RX ORDER — NITROFURANTOIN 25; 75 MG/1; MG/1
100 CAPSULE ORAL 2 TIMES DAILY
COMMUNITY
End: 2017-08-02 | Stop reason: HOSPADM

## 2017-07-28 RX ORDER — POTASSIUM CHLORIDE 750 MG/1
10 TABLET, FILM COATED, EXTENDED RELEASE ORAL 2 TIMES DAILY
Qty: 180 TABLET | Refills: 1 | Status: SHIPPED | OUTPATIENT
Start: 2017-07-28 | End: 2017-08-02 | Stop reason: HOSPADM

## 2017-07-28 NOTE — TELEPHONE ENCOUNTER
Pt needs refill of Percocet will you order? Grand daughter is coming by office to pick this up. He is using it sparingly but still needing it.     She says he is no longer febrile but he is having some urinary symptoms. She plans on taking him tomorrow to ER for treatment of UTI or sooner if he spikes a fever. jas

## 2017-07-29 PROBLEM — N39.0 URINARY TRACT INFECTION ASSOCIATED WITH CATHETERIZATION OF URINARY TRACT (HCC): Status: ACTIVE | Noted: 2017-07-29

## 2017-07-29 PROBLEM — T83.511A URINARY TRACT INFECTION ASSOCIATED WITH CATHETERIZATION OF URINARY TRACT (HCC): Status: ACTIVE | Noted: 2017-07-29

## 2017-07-29 LAB
BACTERIA UR QL AUTO: ABNORMAL /HPF
BILIRUB UR QL STRIP: NEGATIVE
CLARITY UR: ABNORMAL
COLOR UR: YELLOW
GLUCOSE UR STRIP-MCNC: NEGATIVE MG/DL
HGB UR QL STRIP.AUTO: ABNORMAL
HYALINE CASTS UR QL AUTO: ABNORMAL /LPF
KETONES UR QL STRIP: NEGATIVE
LEUKOCYTE ESTERASE UR QL STRIP.AUTO: ABNORMAL
NITRITE UR QL STRIP: NEGATIVE
PH UR STRIP.AUTO: <=5 [PH] (ref 5–8)
PROT UR QL STRIP: ABNORMAL
RBC # UR: ABNORMAL /HPF
REF LAB TEST METHOD: ABNORMAL
SP GR UR STRIP: 1.02 (ref 1–1.03)
SQUAMOUS #/AREA URNS HPF: ABNORMAL /HPF
UROBILINOGEN UR QL STRIP: ABNORMAL
WBC UR QL AUTO: ABNORMAL /HPF

## 2017-07-29 PROCEDURE — 87086 URINE CULTURE/COLONY COUNT: CPT | Performed by: PHYSICIAN ASSISTANT

## 2017-07-29 PROCEDURE — G0378 HOSPITAL OBSERVATION PER HR: HCPCS

## 2017-07-29 PROCEDURE — G8979 MOBILITY GOAL STATUS: HCPCS

## 2017-07-29 PROCEDURE — 81003 URINALYSIS AUTO W/O SCOPE: CPT | Performed by: PHYSICIAN ASSISTANT

## 2017-07-29 PROCEDURE — 99220 PR INITIAL OBSERVATION CARE/DAY 70 MINUTES: CPT | Performed by: INTERNAL MEDICINE

## 2017-07-29 PROCEDURE — 97161 PT EVAL LOW COMPLEX 20 MIN: CPT

## 2017-07-29 PROCEDURE — 81001 URINALYSIS AUTO W/SCOPE: CPT | Performed by: PHYSICIAN ASSISTANT

## 2017-07-29 PROCEDURE — 97110 THERAPEUTIC EXERCISES: CPT

## 2017-07-29 PROCEDURE — G8978 MOBILITY CURRENT STATUS: HCPCS

## 2017-07-29 RX ORDER — CEFDINIR 300 MG/1
300 CAPSULE ORAL 2 TIMES DAILY
Qty: 14 CAPSULE | Refills: 0 | Status: SHIPPED | OUTPATIENT
Start: 2017-07-29 | End: 2017-07-29 | Stop reason: HOSPADM

## 2017-07-29 RX ORDER — FAMOTIDINE 20 MG/1
20 TABLET, FILM COATED ORAL DAILY
Status: DISCONTINUED | OUTPATIENT
Start: 2017-07-29 | End: 2017-08-02 | Stop reason: HOSPADM

## 2017-07-29 RX ORDER — SACCHAROMYCES BOULARDII 250 MG
250 CAPSULE ORAL 2 TIMES DAILY
Status: DISCONTINUED | OUTPATIENT
Start: 2017-07-29 | End: 2017-08-02 | Stop reason: HOSPADM

## 2017-07-29 RX ORDER — GABAPENTIN 400 MG/1
400 CAPSULE ORAL 3 TIMES DAILY
Status: DISCONTINUED | OUTPATIENT
Start: 2017-07-29 | End: 2017-08-02 | Stop reason: HOSPADM

## 2017-07-29 RX ORDER — METOPROLOL TARTRATE 50 MG/1
50 TABLET, FILM COATED ORAL 2 TIMES DAILY
Status: DISCONTINUED | OUTPATIENT
Start: 2017-07-29 | End: 2017-08-02 | Stop reason: HOSPADM

## 2017-07-29 RX ORDER — TAMSULOSIN HYDROCHLORIDE 0.4 MG/1
0.4 CAPSULE ORAL DAILY
Status: DISCONTINUED | OUTPATIENT
Start: 2017-07-29 | End: 2017-08-02 | Stop reason: HOSPADM

## 2017-07-29 RX ORDER — MELATONIN
1000 2 TIMES DAILY
Status: DISCONTINUED | OUTPATIENT
Start: 2017-07-29 | End: 2017-08-02 | Stop reason: HOSPADM

## 2017-07-29 RX ORDER — ASCORBIC ACID 500 MG
500 TABLET ORAL DAILY
Status: DISCONTINUED | OUTPATIENT
Start: 2017-07-29 | End: 2017-08-02 | Stop reason: HOSPADM

## 2017-07-29 RX ORDER — ONDANSETRON 2 MG/ML
4 INJECTION INTRAMUSCULAR; INTRAVENOUS EVERY 6 HOURS PRN
Status: DISCONTINUED | OUTPATIENT
Start: 2017-07-29 | End: 2017-08-02 | Stop reason: HOSPADM

## 2017-07-29 RX ORDER — CEFDINIR 300 MG/1
300 CAPSULE ORAL EVERY 12 HOURS SCHEDULED
Status: DISCONTINUED | OUTPATIENT
Start: 2017-07-29 | End: 2017-07-30

## 2017-07-29 RX ORDER — CEFPODOXIME PROXETIL 100 MG/1
100 TABLET, FILM COATED ORAL EVERY 12 HOURS
Qty: 14 TABLET | Refills: 0 | Status: SHIPPED | OUTPATIENT
Start: 2017-07-29 | End: 2017-07-29 | Stop reason: HOSPADM

## 2017-07-29 RX ORDER — METHENAMINE HIPPURATE 1000 MG/1
1 TABLET ORAL 2 TIMES DAILY
Status: DISCONTINUED | OUTPATIENT
Start: 2017-07-29 | End: 2017-07-30

## 2017-07-29 RX ORDER — OXYCODONE AND ACETAMINOPHEN 10; 325 MG/1; MG/1
1 TABLET ORAL EVERY 6 HOURS PRN
Status: DISCONTINUED | OUTPATIENT
Start: 2017-07-29 | End: 2017-08-02 | Stop reason: HOSPADM

## 2017-07-29 RX ORDER — CALCIUM CARBONATE 200(500)MG
1 TABLET,CHEWABLE ORAL DAILY
Status: DISCONTINUED | OUTPATIENT
Start: 2017-07-29 | End: 2017-08-02 | Stop reason: HOSPADM

## 2017-07-29 RX ORDER — FINASTERIDE 5 MG/1
5 TABLET, FILM COATED ORAL DAILY
Status: DISCONTINUED | OUTPATIENT
Start: 2017-07-29 | End: 2017-08-02 | Stop reason: HOSPADM

## 2017-07-29 RX ADMIN — FINASTERIDE 5 MG: 5 TABLET, FILM COATED ORAL at 10:06

## 2017-07-29 RX ADMIN — RIVAROXABAN 20 MG: 20 TABLET, FILM COATED ORAL at 10:07

## 2017-07-29 RX ADMIN — CEFDINIR 300 MG: 300 CAPSULE ORAL at 10:07

## 2017-07-29 RX ADMIN — GABAPENTIN 400 MG: 400 CAPSULE ORAL at 17:21

## 2017-07-29 RX ADMIN — VITAMIN D, TAB 1000IU (100/BT) 1000 UNITS: 25 TAB at 17:21

## 2017-07-29 RX ADMIN — METHENAMINE HIPPURATE 1 G: 1 TABLET ORAL at 17:24

## 2017-07-29 RX ADMIN — METHENAMINE HIPPURATE 1 G: 1 TABLET ORAL at 10:05

## 2017-07-29 RX ADMIN — TAMSULOSIN HYDROCHLORIDE 0.4 MG: 0.4 CAPSULE ORAL at 10:05

## 2017-07-29 RX ADMIN — GABAPENTIN 400 MG: 400 CAPSULE ORAL at 10:07

## 2017-07-29 RX ADMIN — OXYCODONE HYDROCHLORIDE AND ACETAMINOPHEN 1 TABLET: 10; 325 TABLET ORAL at 10:06

## 2017-07-29 RX ADMIN — CEFDINIR 300 MG: 300 CAPSULE ORAL at 21:18

## 2017-07-29 RX ADMIN — GABAPENTIN 400 MG: 400 CAPSULE ORAL at 21:18

## 2017-07-29 RX ADMIN — METOPROLOL TARTRATE 50 MG: 50 TABLET, FILM COATED ORAL at 10:05

## 2017-07-29 RX ADMIN — FAMOTIDINE 20 MG: 20 TABLET ORAL at 10:06

## 2017-07-30 LAB
ALBUMIN SERPL-MCNC: 2.6 G/DL (ref 3.2–4.8)
ALBUMIN/GLOB SERPL: 0.9 G/DL (ref 1.5–2.5)
ALP SERPL-CCNC: 100 U/L (ref 25–100)
ALT SERPL W P-5'-P-CCNC: 19 U/L (ref 7–40)
ANION GAP SERPL CALCULATED.3IONS-SCNC: 0 MMOL/L (ref 3–11)
AST SERPL-CCNC: 19 U/L (ref 0–33)
BASOPHILS # BLD AUTO: 0.04 10*3/MM3 (ref 0–0.2)
BASOPHILS NFR BLD AUTO: 0.7 % (ref 0–1)
BILIRUB SERPL-MCNC: 0.3 MG/DL (ref 0.3–1.2)
BUN BLD-MCNC: 25 MG/DL (ref 9–23)
BUN/CREAT SERPL: 35.7 (ref 7–25)
CALCIUM SPEC-SCNC: 8.7 MG/DL (ref 8.7–10.4)
CHLORIDE SERPL-SCNC: 102 MMOL/L (ref 99–109)
CO2 SERPL-SCNC: 35 MMOL/L (ref 20–31)
CREAT BLD-MCNC: 0.7 MG/DL (ref 0.6–1.3)
CRP SERPL-MCNC: 5.03 MG/DL (ref 0–1)
DEPRECATED RDW RBC AUTO: 57.3 FL (ref 37–54)
EOSINOPHIL # BLD AUTO: 0.29 10*3/MM3 (ref 0–0.3)
EOSINOPHIL NFR BLD AUTO: 5.2 % (ref 0–3)
ERYTHROCYTE [DISTWIDTH] IN BLOOD BY AUTOMATED COUNT: 19.9 % (ref 11.3–14.5)
GFR SERPL CREATININE-BSD FRML MDRD: 107 ML/MIN/1.73
GLOBULIN UR ELPH-MCNC: 2.9 GM/DL
GLUCOSE BLD-MCNC: 108 MG/DL (ref 70–100)
HCT VFR BLD AUTO: 36.6 % (ref 38.9–50.9)
HGB BLD-MCNC: 11.4 G/DL (ref 13.1–17.5)
IMM GRANULOCYTES # BLD: 0.01 10*3/MM3 (ref 0–0.03)
IMM GRANULOCYTES NFR BLD: 0.2 % (ref 0–0.6)
LYMPHOCYTES # BLD AUTO: 1.3 10*3/MM3 (ref 0.6–4.8)
LYMPHOCYTES NFR BLD AUTO: 23.3 % (ref 24–44)
MCH RBC QN AUTO: 24.5 PG (ref 27–31)
MCHC RBC AUTO-ENTMCNC: 31.1 G/DL (ref 32–36)
MCV RBC AUTO: 78.7 FL (ref 80–99)
MONOCYTES # BLD AUTO: 0.89 10*3/MM3 (ref 0–1)
MONOCYTES NFR BLD AUTO: 15.9 % (ref 0–12)
NEUTROPHILS # BLD AUTO: 3.05 10*3/MM3 (ref 1.5–8.3)
NEUTROPHILS NFR BLD AUTO: 54.7 % (ref 41–71)
PLATELET # BLD AUTO: 189 10*3/MM3 (ref 150–450)
PMV BLD AUTO: 8.9 FL (ref 6–12)
POTASSIUM BLD-SCNC: 4.9 MMOL/L (ref 3.5–5.5)
PROT SERPL-MCNC: 5.5 G/DL (ref 5.7–8.2)
RBC # BLD AUTO: 4.65 10*6/MM3 (ref 4.2–5.76)
SODIUM BLD-SCNC: 137 MMOL/L (ref 132–146)
WBC NRBC COR # BLD: 5.58 10*3/MM3 (ref 3.5–10.8)

## 2017-07-30 PROCEDURE — G0378 HOSPITAL OBSERVATION PER HR: HCPCS

## 2017-07-30 PROCEDURE — 80053 COMPREHEN METABOLIC PANEL: CPT | Performed by: INTERNAL MEDICINE

## 2017-07-30 PROCEDURE — 85025 COMPLETE CBC W/AUTO DIFF WBC: CPT | Performed by: INTERNAL MEDICINE

## 2017-07-30 PROCEDURE — 99226 PR SBSQ OBSERVATION CARE/DAY 35 MINUTES: CPT | Performed by: INTERNAL MEDICINE

## 2017-07-30 PROCEDURE — 25010000002 CEFEPIME: Performed by: INTERNAL MEDICINE

## 2017-07-30 PROCEDURE — 86140 C-REACTIVE PROTEIN: CPT | Performed by: INTERNAL MEDICINE

## 2017-07-30 RX ADMIN — GABAPENTIN 400 MG: 400 CAPSULE ORAL at 17:17

## 2017-07-30 RX ADMIN — METOPROLOL TARTRATE 50 MG: 50 TABLET, FILM COATED ORAL at 17:18

## 2017-07-30 RX ADMIN — Medication 250 MG: at 17:17

## 2017-07-30 RX ADMIN — CEFDINIR 300 MG: 300 CAPSULE ORAL at 09:15

## 2017-07-30 RX ADMIN — VITAMIN D, TAB 1000IU (100/BT) 1000 UNITS: 25 TAB at 17:17

## 2017-07-30 RX ADMIN — VITAMIN D, TAB 1000IU (100/BT) 1000 UNITS: 25 TAB at 09:15

## 2017-07-30 RX ADMIN — CEFEPIME 2 G: 2 INJECTION, POWDER, FOR SOLUTION INTRAVENOUS at 23:45

## 2017-07-30 RX ADMIN — CEFEPIME 2 G: 2 INJECTION, POWDER, FOR SOLUTION INTRAVENOUS at 17:26

## 2017-07-30 RX ADMIN — Medication 250 MG: at 09:14

## 2017-07-30 RX ADMIN — FAMOTIDINE 20 MG: 20 TABLET ORAL at 09:15

## 2017-07-30 RX ADMIN — FINASTERIDE 5 MG: 5 TABLET, FILM COATED ORAL at 09:15

## 2017-07-30 RX ADMIN — GABAPENTIN 400 MG: 400 CAPSULE ORAL at 20:22

## 2017-07-30 RX ADMIN — METHENAMINE HIPPURATE 1 G: 1 TABLET ORAL at 09:14

## 2017-07-30 RX ADMIN — TAMSULOSIN HYDROCHLORIDE 0.4 MG: 0.4 CAPSULE ORAL at 09:15

## 2017-07-30 RX ADMIN — OXYCODONE HYDROCHLORIDE AND ACETAMINOPHEN 500 MG: 500 TABLET ORAL at 09:15

## 2017-07-30 RX ADMIN — METOPROLOL TARTRATE 50 MG: 50 TABLET, FILM COATED ORAL at 09:15

## 2017-07-30 RX ADMIN — OXYCODONE HYDROCHLORIDE AND ACETAMINOPHEN 1 TABLET: 10; 325 TABLET ORAL at 00:02

## 2017-07-30 RX ADMIN — GABAPENTIN 400 MG: 400 CAPSULE ORAL at 09:15

## 2017-07-31 LAB — BACTERIA SPEC AEROBE CULT: ABNORMAL

## 2017-07-31 PROCEDURE — G8989 SELF CARE D/C STATUS: HCPCS

## 2017-07-31 PROCEDURE — G8987 SELF CARE CURRENT STATUS: HCPCS

## 2017-07-31 PROCEDURE — G0378 HOSPITAL OBSERVATION PER HR: HCPCS

## 2017-07-31 PROCEDURE — 97597 DBRDMT OPN WND 1ST 20 CM/<: CPT

## 2017-07-31 PROCEDURE — G8988 SELF CARE GOAL STATUS: HCPCS

## 2017-07-31 PROCEDURE — 97164 PT RE-EVAL EST PLAN CARE: CPT

## 2017-07-31 PROCEDURE — 99225 PR SBSQ OBSERVATION CARE/DAY 25 MINUTES: CPT | Performed by: HOSPITALIST

## 2017-07-31 PROCEDURE — 96366 THER/PROPH/DIAG IV INF ADDON: CPT

## 2017-07-31 PROCEDURE — 96365 THER/PROPH/DIAG IV INF INIT: CPT

## 2017-07-31 PROCEDURE — 29581 APPL MULTLAYER CMPRN SYS LEG: CPT

## 2017-07-31 PROCEDURE — 25010000002 CEFEPIME: Performed by: INTERNAL MEDICINE

## 2017-07-31 RX ORDER — NYSTATIN 100000 U/G
OINTMENT TOPICAL EVERY 12 HOURS SCHEDULED
Status: DISCONTINUED | OUTPATIENT
Start: 2017-07-31 | End: 2017-08-02 | Stop reason: HOSPADM

## 2017-07-31 RX ADMIN — METOPROLOL TARTRATE 50 MG: 50 TABLET, FILM COATED ORAL at 17:01

## 2017-07-31 RX ADMIN — VITAMIN D, TAB 1000IU (100/BT) 1000 UNITS: 25 TAB at 17:01

## 2017-07-31 RX ADMIN — CEFEPIME HYDROCHLORIDE 1 G: 1 INJECTION, POWDER, FOR SOLUTION INTRAMUSCULAR; INTRAVENOUS at 21:23

## 2017-07-31 RX ADMIN — NYSTATIN: 100000 OINTMENT TOPICAL at 21:23

## 2017-07-31 RX ADMIN — GABAPENTIN 400 MG: 400 CAPSULE ORAL at 17:01

## 2017-07-31 RX ADMIN — OXYCODONE HYDROCHLORIDE AND ACETAMINOPHEN 500 MG: 500 TABLET ORAL at 09:24

## 2017-07-31 RX ADMIN — TAMSULOSIN HYDROCHLORIDE 0.4 MG: 0.4 CAPSULE ORAL at 09:24

## 2017-07-31 RX ADMIN — GABAPENTIN 400 MG: 400 CAPSULE ORAL at 09:24

## 2017-07-31 RX ADMIN — FAMOTIDINE 20 MG: 20 TABLET ORAL at 09:24

## 2017-07-31 RX ADMIN — VITAMIN D, TAB 1000IU (100/BT) 1000 UNITS: 25 TAB at 09:24

## 2017-07-31 RX ADMIN — FINASTERIDE 5 MG: 5 TABLET, FILM COATED ORAL at 09:24

## 2017-07-31 RX ADMIN — METOPROLOL TARTRATE 50 MG: 50 TABLET, FILM COATED ORAL at 09:24

## 2017-07-31 RX ADMIN — CALCIUM CARBONATE 1 TABLET: 500 TABLET ORAL at 09:24

## 2017-07-31 RX ADMIN — Medication 250 MG: at 09:24

## 2017-07-31 RX ADMIN — CEFEPIME 2 G: 2 INJECTION, POWDER, FOR SOLUTION INTRAVENOUS at 09:25

## 2017-07-31 RX ADMIN — OXYCODONE HYDROCHLORIDE AND ACETAMINOPHEN 1 TABLET: 10; 325 TABLET ORAL at 06:23

## 2017-07-31 RX ADMIN — GABAPENTIN 400 MG: 400 CAPSULE ORAL at 21:23

## 2017-07-31 RX ADMIN — Medication 250 MG: at 17:01

## 2017-08-01 PROBLEM — G82.20 LOWER PARAPLEGIA (HCC): Status: ACTIVE | Noted: 2017-08-01

## 2017-08-01 PROBLEM — M17.0 OSTEOARTHRITIS OF BOTH KNEES: Status: RESOLVED | Noted: 2017-01-10 | Resolved: 2017-08-01

## 2017-08-01 PROBLEM — C44.91 BASAL CELL CARCINOMA: Status: RESOLVED | Noted: 2017-01-10 | Resolved: 2017-08-01

## 2017-08-01 LAB
ALBUMIN SERPL-MCNC: 2.3 G/DL (ref 3.2–4.8)
ANION GAP SERPL CALCULATED.3IONS-SCNC: 4 MMOL/L (ref 3–11)
BUN BLD-MCNC: 19 MG/DL (ref 9–23)
BUN/CREAT SERPL: 31.7 (ref 7–25)
CALCIUM SPEC-SCNC: 8.3 MG/DL (ref 8.7–10.4)
CHLORIDE SERPL-SCNC: 106 MMOL/L (ref 99–109)
CO2 SERPL-SCNC: 28 MMOL/L (ref 20–31)
CREAT BLD-MCNC: 0.6 MG/DL (ref 0.6–1.3)
GFR SERPL CREATININE-BSD FRML MDRD: 128 ML/MIN/1.73
GLUCOSE BLD-MCNC: 94 MG/DL (ref 70–100)
PHOSPHATE SERPL-MCNC: 3.7 MG/DL (ref 2.4–5.1)
POTASSIUM BLD-SCNC: 4.5 MMOL/L (ref 3.5–5.5)
SODIUM BLD-SCNC: 138 MMOL/L (ref 132–146)

## 2017-08-01 PROCEDURE — 99225 PR SBSQ OBSERVATION CARE/DAY 25 MINUTES: CPT | Performed by: HOSPITALIST

## 2017-08-01 PROCEDURE — G0378 HOSPITAL OBSERVATION PER HR: HCPCS

## 2017-08-01 PROCEDURE — 80069 RENAL FUNCTION PANEL: CPT | Performed by: HOSPITALIST

## 2017-08-01 PROCEDURE — 96366 THER/PROPH/DIAG IV INF ADDON: CPT

## 2017-08-01 PROCEDURE — 97110 THERAPEUTIC EXERCISES: CPT

## 2017-08-01 RX ADMIN — FAMOTIDINE 20 MG: 20 TABLET ORAL at 08:56

## 2017-08-01 RX ADMIN — GABAPENTIN 400 MG: 400 CAPSULE ORAL at 16:11

## 2017-08-01 RX ADMIN — Medication 250 MG: at 17:36

## 2017-08-01 RX ADMIN — GABAPENTIN 400 MG: 400 CAPSULE ORAL at 22:02

## 2017-08-01 RX ADMIN — VITAMIN D, TAB 1000IU (100/BT) 1000 UNITS: 25 TAB at 08:56

## 2017-08-01 RX ADMIN — METOPROLOL TARTRATE 50 MG: 50 TABLET, FILM COATED ORAL at 17:36

## 2017-08-01 RX ADMIN — CEFEPIME HYDROCHLORIDE 1 G: 1 INJECTION, POWDER, FOR SOLUTION INTRAMUSCULAR; INTRAVENOUS at 14:02

## 2017-08-01 RX ADMIN — OXYCODONE HYDROCHLORIDE AND ACETAMINOPHEN 500 MG: 500 TABLET ORAL at 08:58

## 2017-08-01 RX ADMIN — Medication 250 MG: at 08:58

## 2017-08-01 RX ADMIN — VITAMIN D, TAB 1000IU (100/BT) 1000 UNITS: 25 TAB at 17:36

## 2017-08-01 RX ADMIN — OXYCODONE HYDROCHLORIDE AND ACETAMINOPHEN 1 TABLET: 10; 325 TABLET ORAL at 16:39

## 2017-08-01 RX ADMIN — CEFEPIME HYDROCHLORIDE 1 G: 1 INJECTION, POWDER, FOR SOLUTION INTRAMUSCULAR; INTRAVENOUS at 22:03

## 2017-08-01 RX ADMIN — FINASTERIDE 5 MG: 5 TABLET, FILM COATED ORAL at 08:49

## 2017-08-01 RX ADMIN — RIVAROXABAN 20 MG: 20 TABLET, FILM COATED ORAL at 08:57

## 2017-08-01 RX ADMIN — TAMSULOSIN HYDROCHLORIDE 0.4 MG: 0.4 CAPSULE ORAL at 08:58

## 2017-08-01 RX ADMIN — CEFEPIME HYDROCHLORIDE 1 G: 1 INJECTION, POWDER, FOR SOLUTION INTRAMUSCULAR; INTRAVENOUS at 05:06

## 2017-08-01 RX ADMIN — METOPROLOL TARTRATE 50 MG: 50 TABLET, FILM COATED ORAL at 08:57

## 2017-08-01 RX ADMIN — NYSTATIN 1 APPLICATION: 100000 OINTMENT TOPICAL at 08:47

## 2017-08-01 RX ADMIN — GABAPENTIN 400 MG: 400 CAPSULE ORAL at 08:57

## 2017-08-01 RX ADMIN — CALCIUM CARBONATE 1 TABLET: 500 TABLET ORAL at 08:47

## 2017-08-01 RX ADMIN — NYSTATIN: 100000 OINTMENT TOPICAL at 22:03

## 2017-08-01 NOTE — PROGRESS NOTES
"[facility] Progress Note    Admission Date: 2017    Calderon Scott  1932  [id number]    Date: 2017    Meds:    IV Anti-Infectives     Ordered     Dose/Rate Route Frequency Start Stop    17 1556  cefepime (MAXIPIME) 1 g/100 mL 0.9% NS IVPB (mbp)     Ordering Provider:  Jacky Wolf MD    1 g Intravenous Every 8 Hours 17 2200            CC: UTI      SUBJECTIVE:17: Patient is a 84 y.o. male, with a chronic indwelling owusu catheter since 2017, had the owusu changed by home health last week.  Several days later the owusu got \"caught\" on his leg, was pulled and began leaking.  A urinalysis was sent, and he was notified to present to the ED for IV antibiotics.  He denies any fever, (99 degrees prior to admission) no  shaking chills, pelvic pressure.  He received 2 doses of Cefdinir, and was changed to Cefepime. We were consulted for antibiotic management.  He has been seen by urology at [facility], and is scheduled for epidural injections tomorrow at .   17:  Up in chair.  No complaints of nausea, diarrhea, fever \"feel fine\"    ROS:  No f/c/s. No n/v/d. No rash. No new ADR to Abx.     PE:   Vital Signs  Temp (24hrs), Av.2 °F (36.8 °C), Min:97.6 °F (36.4 °C), Max:98.7 °F (37.1 °C)    Temp  Min: 97.6 °F (36.4 °C)  Max: 98.7 °F (37.1 °C)  BP  Min: 121/55  Max: 134/80  Pulse  Min: 69  Max: 73  Resp  Min: 18  Max: 18  SpO2  Min: 96 %  Max: 96 %    GENERAL: Awake and alert, in no acute distress.   HEENT:  No conjunctival injection. No icterus. Oropharynx clear without evidence of thrush or exudate.  NECK: Supple, no JVD     HEART: RRR; No murmur, rubs, gallops.   LUNGS: Clear to auscultation bilaterally without wheezing, rales, rhonchi. Normal respiratory effort. Nonlabored. No dullness.  ABDOMEN: Soft, nontender, nondistended. Positive bowel sounds. No rebound or guarding. NO mass or HSM.  EXT:  No cyanosis, clubbing or edema. No cord.  : Genitalia generally unremarkable. + owusu   SKIN: " Warm and dry without cutaneous eruptions on Inspection/palpation.    NEURO: Alert and oriented x 3, Motor 5/5 bilaterally    Laboratory Data      Results from last 7 days  Lab Units 07/30/17  0545 07/28/17  2144   WBC 10*3/mm3 5.58 6.36   HEMOGLOBIN g/dL 11.4* 13.1   HEMATOCRIT % 36.6* 41.8   PLATELETS 10*3/mm3 189 232       Results from last 7 days  Lab Units 08/01/17  0513   SODIUM mmol/L 138   POTASSIUM mmol/L 4.5   CHLORIDE mmol/L 106   CO2 mmol/L 28.0   BUN mg/dL 19   CREATININE mg/dL 0.60   GLUCOSE mg/dL 94   CALCIUM mg/dL 8.3*       Results from last 7 days  Lab Units 07/30/17  0545   ALK PHOS U/L 100   BILIRUBIN mg/dL 0.3   ALT (SGPT) U/L 19   AST (SGOT) U/L 19           Results from last 7 days  Lab Units 07/30/17  0545   CRP mg/dL 5.03*       Results from last 7 days  Lab Units 07/28/17  2144   LACTATE mmol/L 1.3             Estimated Creatinine Clearance: 95.4 mL/min (by C-G formula based on Cr of 0.6).    Microbiology:  Blood Culture   Date Value Ref Range Status   07/28/2017 No growth at 3 days  Preliminary   07/28/2017 No growth at 3 days  Preliminary                    Urine Culture   Date Value Ref Range Status   07/28/2017 >100,000 CFU/mL Pseudomonas aeruginosa (A)  Final     Comment:     Multi drug resistant Pseudomonas, patient may be an isolation risk.          Radiology:  Imaging Results (last 72 hours)     ** No results found for the last 72 hours. **              IMPRESSION:  1.   Pseudomonas UTI-with an indwelling Oneal catheter in place.  It has been somewhat difficult to determine if this was actually asymptomatic UTI or asymptomatic bacteriuria/colonization.  He does appear to have had a fever shortly prior to admission that may have been secondary to UTI.  I think that we should treat him relatively briefly.  There are no oral options for therapy.  I will plan to leave him on cefepime until Wednesday a.m.  It is unclear if he just has the Oneal catheter and due to immobility or if he  actually had urinary obstruction or urinary retention.  I have suggested that he see [facility] urology in follow-up in the near future.  I think that an attempt should be made to remove his catheter in order to lower his risk for recurrent resistant organism UTI.   2.  Afib  3.  CHF  4,. Herniated discs     PLAN/RECOMMENDATIONS:   1.  Cefepime 1 g IV every 8 hours  2.  Follow-up with [facility] urology with  the goal of removing his Oneal catheter  3.  Probable discharge on Wednesday        Dr. Wolf has obtained the history, performed the physical exam and formulated the above treatment plan.     Jacky Wolf MD  8/1/2017  6:47 PM

## 2017-08-01 NOTE — PLAN OF CARE
Problem: Patient Care Overview (Adult)  Goal: Plan of Care Review  Outcome: Ongoing (interventions implemented as appropriate)    08/01/17 1341   Coping/Psychosocial Response Interventions   Plan Of Care Reviewed With patient   Patient Care Overview   Progress no change   Outcome Evaluation   Outcome Summary/Follow up Plan mechanical lift to chair for safety, bilateral knee flexion limited due to pain.         Problem: Inpatient Physical Therapy  Goal: Bed Mobility Goal LTG- PT  Outcome: Ongoing (interventions implemented as appropriate)    07/29/17 1621 08/01/17 1341   Bed Mobility PT LTG   Bed Mobility PT LTG, Date Established 07/29/17 --    Bed Mobility PT LTG, Time to Achieve 2 wks --    Bed Mobility PT LTG, Activity Type roll left/roll right;supine to sit/sit to supine --    Bed Mobility PT LTG, Borden Level moderate assist (50% patient effort);2 person assist required --    Bed Mobility PT LTG, Date Goal Reviewed --  08/01/17   Bed Mobility PT LTG, Outcome --  goal ongoing       Goal: Static Sitting Balance Goal LTG- PT  Outcome: Ongoing (interventions implemented as appropriate)    07/29/17 1621 08/01/17 1341   Static Sitting Balance PT LTG   Static Sitting Balance PT LTG, Time to Achieve 2 wks --    Static Sitting Balance PT LTG, Borden Level moderate assist (50% patient effort) --    Static Sitting Balance PT LTG, Assist Device UE Support --    Static Sitting Balance PT LTG, Date Goal Reviewed --  08/01/17   Static Sitting Balance PT LTG, Outcome --  goal ongoing

## 2017-08-01 NOTE — THERAPY TREATMENT NOTE
Acute Care - Physical Therapy Treatment Note  [facility]     Patient Name: Calderon Scott  : 1932  MRN: [id number]  Today's Date: 2017  Onset of Illness/Injury or Date of Surgery Date: 17  Date of Referral to PT: 17  Referring Physician: MD Kermit    Admit Date: 2017    Visit Dx:    ICD-10-CM ICD-9-CM   1. Urinary tract infection associated with catheterization of urinary tract, unspecified indwelling urinary catheter type, initial encounter T83.511A 996.64    N39.0 599.0   2. Pleural effusion, left J90 511.9   3. Congestive heart failure, unspecified congestive heart failure chronicity, unspecified congestive heart failure type I50.9 428.0   4. Hypoalbuminemia E88.09 273.8   5. Impaired functional mobility, balance, gait, and endurance Z74.09 V49.89     Patient Active Problem List   Diagnosis   • Essential hypertension   • Congestive heart failure   • Malignant melanoma of torso excluding breast   • Numbness and tingling of hand   • Deep vein thrombosis (DVT) of proximal vein of right lower extremity   • Pre-ulcerative corn or callous   • Chronic a-fib   • Urinary tract infection associated with catheterization of urinary tract   • Lower paraplegia               Adult Rehabilitation Note       17 1313          Rehab Assessment/Intervention    Discipline physical therapy assistant  -AS      Document Type therapy note (daily note)  -AS      Subjective Information agree to therapy;complains of;weakness;pain  -AS      Patient Effort, Rehab Treatment adequate  -AS      Precautions/Limitations fall precautions  -AS      Recorded by [AS] Rajni Dawn PTA      Pain Assessment    Pain Assessment 0-10  -AS      Pain Score 5  -AS      Post Pain Score 0  -AS      Pain Type Chronic pain  -AS      Pain Location Knee  -AS      Pain Orientation Right;Left   right>left  -AS      Pain Intervention(s) Repositioned  -AS      Response to Interventions tolerated  -AS      Recorded by [AS]  Rajni Dawn PTA      Cognitive Assessment/Intervention    Current Cognitive/Communication Assessment functional  -AS      Orientation Status oriented x 4  -AS      Follows Commands/Answers Questions 100% of the time;able to follow single-step instructions  -AS      Personal Safety WNL/WFL  -AS      Personal Safety Interventions fall prevention program maintained  -AS      Recorded by [AS] Rajni Dawn PTA      Bed Mobility, Assessment/Treatment    Bed Mobility, Assistive Device draw sheet  -AS      Bed Mobility, Roll Left, [place] verbal cues required;maximum assist (25% patient effort)  -AS      Bed Mobility, Roll Right, [place] verbal cues required;maximum assist (25% patient effort)  -AS      Bed Mobility, Comment rolled to position lift sling  -AS      Recorded by [AS] Rajni Dawn PTA      Transfer Assessment/Treatment    Transfers, Bed-Chair [place] dependent (less than 25% patient effort);2 person assist required  -AS      Transfers, Bed-Chair-Bed, Assist Device mechanical lift  -AS      Transfer, Comment bed<>chair with mechanical lift  -AS      Recorded by [AS] Rajni Dawn PTA      Gait Assessment/Treatment    Gait, [place] Level not appropriate to assess  -AS      Recorded by [AS] Rajni Dawn PTA      Therapy Exercises    Bilateral Lower Extremities PROM:;AAROM:;10 reps;supine;ankle pumps/circles;heel slides;hip abduction/adduction;SLR   limited knee flexion due to pain and edema  -AS      Recorded by [AS] Rajni Dawn PTA      Positioning and Restraints    Pre-Treatment Position in bed  -AS      Post Treatment Position chair  -AS      In Chair reclined;call light within reach;encouraged to call for assist;on mechanical lift sling;legs elevated  -AS      Recorded by [AS] Rajni Dawn PTA        User Key  (r) = Recorded By, (t) = Taken By, (c) = Cosigned By    Initials Name Effective Dates    AS Rajni Dawn PTA 06/22/15 -                  IP PT Goals       08/01/17 1341 07/31/17 1015 07/29/17 1621    Bed Mobility PT LTG    Bed Mobility PT LTG, Date Established   07/29/17  -KM    Bed Mobility PT LTG, Time to Achieve   2 wks  -KM    Bed Mobility PT LTG, Activity Type   roll left/roll right;supine to sit/sit to supine  -KM    Bed Mobility PT LTG, [place] Level   moderate assist (50% patient effort);2 person assist required  -KM    Bed Mobility PT LTG, Date Goal Reviewed 08/01/17  -AS      Bed Mobility PT LTG, Outcome goal ongoing  -AS      Static Sitting Balance PT LTG    Static Sitting Balance PT LTG, Time to Achieve   2 wks  -KM    Static Sitting Balance PT LTG, [place] Level   moderate assist (50% patient effort)  -KM    Static Sitting Balance PT LTG, Assist Device   UE Support  -KM    Static Sitting Balance PT LTG, Date Goal Reviewed 08/01/17  -AS      Static Sitting Balance PT LTG, Outcome goal ongoing  -AS      Wound Care PT LTG    Wound Care PT LTG 1, Date Established  07/31/17  -MC     Wound Care PT LTG 1, Time to Achieve  5 - 7 days  -MC     Wound Care PT LTG 1, Location  R ankle wound  -MC     Wound Care PT LTG 1, No S&S of Infection  yes  -MC     Wound Care PT LTG 1, Decrease Wound Size  25%  -MC     Wound Care PT LTG 1, No New Skin Break Down  yes  -MC     Wound Care PT LTG 1, Education  dressing changes;wound care  -MC     Wound Care PT LTG 1, Education Understanding  verbalize understanding  -MC     Wound Care 2 PT LTG    Wound Care PT LTG 2, Date Established  07/31/17  -MC     Wound Care PT LTG 2, Time to Achieve  5 - 7 days  -MC     Wound Care PT LTG 2, Location  BLE edema  -MC     Wound Care PT LTG 2, Decrease Limb Girth cm  2  -MC     Wound Care PT LTG 2, No New Skin Break Down  yes  -MC     Wound Care PT LTG 2, Education  edema management;pressure relief  -MC     Wound Care PT LTG 2, Education Understanding  verbalize understanding  -MC       User Key  (r) = Recorded By, (t) = Taken By, (c) = Cosigned By     Initials Name Provider Type     Mariela Romero, PT Physical Therapist    AS Rajni Dawn, NICK Physical Therapy Assistant     Ashley Luna, PT Physical Therapist          Physical Therapy Education     Title: PT OT SLP Therapies (Active)     Topic: Physical Therapy (Active)     Point: Mobility training (Active)    Learning Progress Summary    Learner Readiness Method Response Comment Documented by Status   Patient Acceptance E NR  AS 08/01/17 1341 Active    Acceptance E VU  CM 08/01/17 0100 Done    Acceptance E VU  CM 07/31/17 0229 Done    Acceptance E VU  KM 07/29/17 1620 Done               Point: Home exercise program (Active)    Learning Progress Summary    Learner Readiness Method Response Comment Documented by Status   Patient Acceptance E NR  AS 08/01/17 1341 Active    Acceptance E VU  CM 08/01/17 0100 Done    Acceptance E VU  CM 07/31/17 0229 Done    Acceptance E VU  KM 07/29/17 1620 Done               Point: Body mechanics (Active)    Learning Progress Summary    Learner Readiness Method Response Comment Documented by Status   Patient Acceptance E NR  AS 08/01/17 1341 Active    Acceptance E VU  CM 08/01/17 0100 Done    Acceptance E VU  CM 07/31/17 0229 Done    Acceptance E VU  KM 07/29/17 1620 Done               Point: Precautions (Active)    Learning Progress Summary    Learner Readiness Method Response Comment Documented by Status   Patient Acceptance E NR  AS 08/01/17 1341 Active    Acceptance E VU  CM 08/01/17 0100 Done    Acceptance E VU  CM 07/31/17 0229 Done    Acceptance E VU  KM 07/29/17 1620 Done                      User Key     Initials Effective Dates Name Provider Type Discipline     06/19/15 -  Mariela Romero, PT Physical Therapist PT    AS 06/22/15 -  Rajni Dawn PTA Physical Therapy Assistant PT     06/16/17 -  GHADA Krishnamurthy Registered Nurse Nurse                    PT Recommendation and Plan  Anticipated Discharge Disposition: home with home  health  PT Frequency: 2-3 times/wk  Plan of Care Review  Plan Of Care Reviewed With: patient  Progress: no change  Outcome Summary/Follow up Plan: mechanical lift to chair for safety, bilateral knee flexion limited due to pain.          Outcome Measures       08/01/17 1313          How much help from another person do you currently need...    Turning from your back to your side while in flat bed without using bedrails? 2  -AS      Moving from lying on back to sitting on the side of a flat bed without bedrails? 1  -AS      Moving to and from a bed to a chair (including a wheelchair)? 1  -AS      Standing up from a chair using your arms (e.g., wheelchair, bedside chair)? 1  -AS      Climbing 3-5 steps with a railing? 1  -AS      To walk in hospital room? 1  -AS      AM-PAC 6 Clicks Score 7  -AS      Functional Assessment    Outcome Measure Options AM-PAC 6 Clicks Basic Mobility (PT)  -AS        User Key  (r) = Recorded By, (t) = Taken By, (c) = Cosigned By    Initials Name Provider Type    AS Rajni Dawn PTA Physical Therapy Assistant           Time Calculation:         PT Charges       08/01/17 1343          Time Calculation    Start Time 1313  -AS      PT Received On 08/01/17  -AS      PT Goal Re-Cert Due Date 08/08/17  -AS      Time Calculation- PT    Total Timed Code Minutes- PT 23 minute(s)  -AS        User Key  (r) = Recorded By, (t) = Taken By, (c) = Cosigned By    Initials Name Provider Type    AS Rajni Dawn PTA Physical Therapy Assistant          Therapy Charges for Today     Code Description Service Date Service Provider Modifiers Qty    46476617741 HC PT THER PROC EA 15 MIN 8/1/2017 Rajni Dawn PTA GP 2    42687057782 HC PT THER SUPP EA 15 MIN 8/1/2017 Rajni Dawn PTA GP 2          PT G-Codes  Outcome Measure Options: AM-PAC 6 Clicks Basic Mobility (PT)  Score: 6  Functional Limitation: Changing and maintaining body position  Mobility: Walking and Moving Around Current  Status (): 100 percent impaired, limited or restricted  Mobility: Walking and Moving Around Goal Status (): At least 80 percent but less than 100 percent impaired, limited or restricted    Rajni Dawn, PTA  8/1/2017

## 2017-08-01 NOTE — PROGRESS NOTES
"      HOSPITALIST DAILY PROGRESS NOTE    Chief Complaint: CAUTI    Subjective   SUBJECTIVE/OVERNIGHT EVENTS   No acute events ON. No complaints. Eating and sleeping well.    Review of Systems:  General - No fevers, no chills  CVS - No chest pain, no palpitations  Resp - No cough, no dyspnea  GI - No N/V/D, no abd pain    Objective   OBJECTIVE   I have reviewed the vital signs.  /55  Pulse 69  Temp 97.6 °F (36.4 °C) (Oral)   Resp 18  Ht 74\" (188 cm)  Wt 269 lb 12.8 oz (122 kg)  SpO2 96%  BMI 34.64 kg/m2    Physical Exam:  General - NAD, pt was laying in bed comfortably  HEENT - EOMI, PERRL, oropharynx clear, hearing intact  CVS - RRR, S1 S2, no rubs, gallops or murmurs, 2s cap refill, no peripheral edema, 2+ pulses  Resp - CTAB, no crackles and wheezes   GI - +BS, soft, ND, not tender to palpation  MSK - No joint pain or joint edema  Neuro - Awake and oriented, follows commands, interactive, moves all extremities equally, no sensation in both feet which is not new    Results:  I have reviewed the labs, culture data, radiology results, and diagnostic studies.      Results from last 7 days  Lab Units 07/30/17  0545 07/28/17  2144   WBC 10*3/mm3 5.58 6.36   HEMOGLOBIN g/dL 11.4* 13.1   HEMATOCRIT % 36.6* 41.8   PLATELETS 10*3/mm3 189 232       Results from last 7 days  Lab Units 08/01/17  0513   SODIUM mmol/L 138   POTASSIUM mmol/L 4.5   CHLORIDE mmol/L 106   CO2 mmol/L 28.0   BUN mg/dL 19   CREATININE mg/dL 0.60   GLUCOSE mg/dL 94   CALCIUM mg/dL 8.3*       Culture Data:  Cultures:    Blood Culture   Date Value Ref Range Status   07/28/2017 No growth at 2 days  Preliminary   07/28/2017 No growth at 2 days  Preliminary     Urine Culture   Date Value Ref Range Status   07/28/2017 >100,000 CFU/mL Non-Lactose  (A)  Preliminary       I have reviewed the medications.    calcium carbonate 1 tablet Oral Daily   cefepime 1 g Intravenous Q8H   cholecalciferol 1,000 Units Oral BID   famotidine 20 mg Oral " Daily   finasteride 5 mg Oral Daily   gabapentin 400 mg Oral TID   metoprolol tartrate 50 mg Oral BID   nystatin  Topical Q12H   rivaroxaban 20 mg Oral Daily   saccharomyces boulardii 250 mg Oral BID   tamsulosin 0.4 mg Oral Daily   vitamin C 500 mg Oral Daily       Assessment/Plan   ASSESSMENT/PLAN      Hospital Problem List     Chronic a-fib    Urinary tract infection associated with catheterization of urinary tract    Lower paraplegia    Overview Signed 8/1/2017  1:41 PM by Chelsie Cardozo MD     From spinal stenosis             # Pseudomonas aeruginosa CAUTI  - ID on board. No WBC or fevers inpatient to indicate systemic infection other than a high temp at home. No respiratory or GI complaints  - Getting treated with Cefepime until Wed with plans to DC home after  - Pt should FU with Urology at  for possible removal of owusu    # Chronic A. Fib  - Rate controlled with metoprolol 50mg BID  - Xarelto (pt has been refusing it due to instructions to hold it prior to planned spinal injection at )    # Paraplegia from lumbar stenosis  # Neurogenic bladder  - Bedridden since 02/2017  - Not a surgical candidate  - Chronic owusu followed by Urology at   - Tamsulosin, Finasteride  - Gabapentin    DVT PPx: Xarelto  DNR/DNI   I expect patient to be discharged in: 1 day    Chelsie Cardozo MD  08/01/17  6:35 AM

## 2017-08-01 NOTE — PLAN OF CARE
Problem: Patient Care Overview (Adult)  Goal: Plan of Care Review  Outcome: Ongoing (interventions implemented as appropriate)  Goal: Adult Individualization and Mutuality  Outcome: Ongoing (interventions implemented as appropriate)  Goal: Discharge Needs Assessment  Outcome: Ongoing (interventions implemented as appropriate)    Problem: Skin Integrity Impairment, Risk/Actual (Adult)  Goal: Skin Integrity/Wound Healing  Outcome: Ongoing (interventions implemented as appropriate)

## 2017-08-01 NOTE — PLAN OF CARE
Problem: Patient Care Overview (Adult)  Goal: Plan of Care Review  Outcome: Ongoing (interventions implemented as appropriate)    07/30/17 1418 07/31/17 1800 08/01/17 0428   Coping/Psychosocial Response Interventions   Plan Of Care Reviewed With --  patient --    Patient Care Overview   Progress improving --  --    Outcome Evaluation   Outcome Summary/Follow up Plan --  --  Pt ulceration on legs not visble due to WOC applied wrappings from knee to foot. Turned q2, bathed in morning. Patient sleeps well throughout shift.       Goal: Adult Individualization and Mutuality  Outcome: Ongoing (interventions implemented as appropriate)  Goal: Discharge Needs Assessment  Outcome: Ongoing (interventions implemented as appropriate)    Problem: Skin Integrity Impairment, Risk/Actual (Adult)  Goal: Skin Integrity/Wound Healing  Outcome: Ongoing (interventions implemented as appropriate)    Problem: Fall Risk (Adult)  Goal: Absence of Falls  Outcome: Ongoing (interventions implemented as appropriate)

## 2017-08-02 VITALS
WEIGHT: 269.8 LBS | BODY MASS INDEX: 34.63 KG/M2 | DIASTOLIC BLOOD PRESSURE: 63 MMHG | TEMPERATURE: 97.7 F | OXYGEN SATURATION: 97 % | RESPIRATION RATE: 18 BRPM | SYSTOLIC BLOOD PRESSURE: 148 MMHG | HEART RATE: 59 BPM | HEIGHT: 74 IN

## 2017-08-02 LAB
BACTERIA SPEC AEROBE CULT: NORMAL
BACTERIA SPEC AEROBE CULT: NORMAL

## 2017-08-02 PROCEDURE — G0378 HOSPITAL OBSERVATION PER HR: HCPCS

## 2017-08-02 PROCEDURE — 29581 APPL MULTLAYER CMPRN SYS LEG: CPT

## 2017-08-02 PROCEDURE — 99217 PR OBSERVATION CARE DISCHARGE MANAGEMENT: CPT | Performed by: PHYSICIAN ASSISTANT

## 2017-08-02 RX ORDER — ASCORBIC ACID 500 MG
500 TABLET ORAL DAILY
Qty: 30 TABLET | Refills: 0 | Status: SHIPPED | OUTPATIENT
Start: 2017-08-02

## 2017-08-02 RX ORDER — NYSTATIN 100000 U/G
OINTMENT TOPICAL EVERY 12 HOURS SCHEDULED
Qty: 30 G | Refills: 0 | Status: SHIPPED | OUTPATIENT
Start: 2017-08-02

## 2017-08-02 RX ADMIN — CALCIUM CARBONATE 1 TABLET: 500 TABLET ORAL at 08:50

## 2017-08-02 RX ADMIN — METOPROLOL TARTRATE 50 MG: 50 TABLET, FILM COATED ORAL at 17:38

## 2017-08-02 RX ADMIN — FAMOTIDINE 20 MG: 20 TABLET ORAL at 08:49

## 2017-08-02 RX ADMIN — CEFEPIME HYDROCHLORIDE 1 G: 1 INJECTION, POWDER, FOR SOLUTION INTRAMUSCULAR; INTRAVENOUS at 05:13

## 2017-08-02 RX ADMIN — FINASTERIDE 5 MG: 5 TABLET, FILM COATED ORAL at 08:50

## 2017-08-02 RX ADMIN — GABAPENTIN 400 MG: 400 CAPSULE ORAL at 08:50

## 2017-08-02 RX ADMIN — OXYCODONE HYDROCHLORIDE AND ACETAMINOPHEN 500 MG: 500 TABLET ORAL at 08:49

## 2017-08-02 RX ADMIN — NYSTATIN 1 APPLICATION: 100000 OINTMENT TOPICAL at 08:50

## 2017-08-02 RX ADMIN — Medication 250 MG: at 17:38

## 2017-08-02 RX ADMIN — TAMSULOSIN HYDROCHLORIDE 0.4 MG: 0.4 CAPSULE ORAL at 08:50

## 2017-08-02 RX ADMIN — RIVAROXABAN 20 MG: 20 TABLET, FILM COATED ORAL at 08:50

## 2017-08-02 RX ADMIN — GABAPENTIN 400 MG: 400 CAPSULE ORAL at 15:11

## 2017-08-02 RX ADMIN — VITAMIN D, TAB 1000IU (100/BT) 1000 UNITS: 25 TAB at 08:50

## 2017-08-02 RX ADMIN — VITAMIN D, TAB 1000IU (100/BT) 1000 UNITS: 25 TAB at 17:38

## 2017-08-02 RX ADMIN — METOPROLOL TARTRATE 50 MG: 50 TABLET, FILM COATED ORAL at 08:50

## 2017-08-02 RX ADMIN — Medication 250 MG: at 08:50

## 2017-08-02 NOTE — PROGRESS NOTES
Continued Stay Note   [place]     Patient Name: Calderon Scott  MRN: [id number]  Today's Date: 8/2/2017    Admit Date: 7/28/2017          Discharge Plan       08/02/17 1205    Case Management/Social Work Plan    Plan Home    Patient/Family In Agreement With Plan yes    Additional Comments I spoke with patient and then called his granddaughter, Nya regarding discharge planning. Tele [phone].   Family provides his around the clock care. No further DME needs identified. Patient plans to continue with [facility].     Have arranged [facility] transport for 6 pm today. Form in hard chart. tele [phone], (spoke with Joan).       I have set up a follow up appointment at  Urology clinic and have placed in AVS.     Resume orders called to Tanner @ [facility] and will fax records to [phone].               Discharge Codes     None            Marjorie Carlin RN

## 2017-08-02 NOTE — PLAN OF CARE
Problem: Patient Care Overview (Adult)  Goal: Plan of Care Review  Outcome: Ongoing (interventions implemented as appropriate)    08/01/17 1341   Coping/Psychosocial Response Interventions   Plan Of Care Reviewed With patient   Patient Care Overview   Progress no change   Outcome Evaluation   Outcome Summary/Follow up Plan mechanical lift to chair for safety, bilateral knee flexion limited due to pain.       Goal: Discharge Needs Assessment  Outcome: Ongoing (interventions implemented as appropriate)    Problem: Skin Integrity Impairment, Risk/Actual (Adult)  Goal: Skin Integrity/Wound Healing  Outcome: Ongoing (interventions implemented as appropriate)    Problem: Fall Risk (Adult)  Goal: Absence of Falls  Outcome: Ongoing (interventions implemented as appropriate)

## 2017-08-02 NOTE — DISCHARGE INSTR - APPOINTMENTS
[facility], Urology Clinic [address], [address] floor [address], [address]. Tele [phone]. August 16 @ 11:40 am.     [facility] [phone]

## 2017-08-02 NOTE — DISCHARGE SUMMARY
[facility] Medicine Services  DISCHARGE SUMMARY       Date of Admission: 7/28/2017  Date of Discharge:  8/2/2017  Primary Care Physician: BAL Farmer  Consulting Physician(s)     Provider Relationship    Jacky Wolf MD Consulting Physician          Discharge Diagnoses:  Active Hospital Problems (** Indicates Principal Problem)    Diagnosis Date Noted   • Lower paraplegia [G82.20] 08/01/2017     From spinal stenosis     • Urinary tract infection associated with catheterization of urinary tract [T83.511A, N39.0] 07/29/2017   • Chronic a-fib [I48.2] 05/18/2017      Resolved Hospital Problems    Diagnosis Date Noted Date Resolved   No resolved problems to display.       Presenting Problem/History of Present Illness  Urinary tract infection associated with catheterization of urinary tract, unspecified indwelling urinary catheter type, initial encounter [T83.511A, N39.0]     History of Present Illness on Day of Discharge:   Patient resting in bed at time of visit, reports feeling well, no complaints.  Patient denies fever, chills, myalgias, shortness of air, chest pain, abdominal pain, nausea vomiting or diarrhea.  Patient is eager to go home, lives with his granddaughter.  Case management has helped arrange transport home.  Patient understands plan to follow up with [facility] urologist for further evaluation discussion on management of urinary retention, consideration of appropriateness of discontinuation of Oneal catheter.      Hospital Course  Patient is a 84 y.o. 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 neurosurgery, orthopedic surgery and urology and deemed to be not a surgical candidate. The patient has a chronic indwelling Oneal catheter that is replaced every 4 weeks.  He states that he's had multiple antibiotic courses for presumed urinary tract infection often drawn from his Oneal bag without any urinary symptoms or systemic signs of infection     On Wednesday, July 26, 2017 the patient had a routine home visit for a Oneal catheter exchange.  Shortly thereafter the patient was rolling in bed and accidentally pulled on the Oneal catheter and had leakage around the catheter.  For some reason a urine sample was sent then and grew pseudomonas aeruginosa resistant to oral antibiotics and he received a phone call from his primary care physician to present to the emergency room 7/28/17. At time or presentation patient denied any fever, pelvic pain, flank pain, nausea, vomiting or any signs of infection.   Patient was admitted to the hospitalist service for further evaluation and management.  ID was consulted for further evaluation and management.  Dr. Wolf evaluated patient, patient reported recollection of fever up to 101 F prior to admission, given this and culture positive for Pseudomonas ID recommended short course of cefepime 1 g every 8 hours.  This was completed on Wednesday August/2/2017.  ID recommends patient follow-up with [facility] urology with goal of discontinuation of Oneal catheter due to patient's risk of recurrent resistant organism UTI, urinary retention/bladder obstruction to be managed otherwise per [facility] urology as deemed appropriate.  Patient has remained afebrile, blood pressure controlled, blood cx negative to date, and asymptomatic.Follow-up appointment has been arranged with [facility] urology clinic August 16, 2017 at 11:40 AM.     Home medications were continued for chronic atrial fibrillation, lisinopril and Lasix were discontinued given no reported history of  heart failure, appeared to have volume depletion at time of presentation.  To follow-up with patient's PCP at time of discharge for further monitoring and management.    PT OT recommending home with home health at time of discharge, case management has discussed disposition with patient's granddaughter he reports patient being provided 24-hour care, no further DME needs identified, will resume, with home health at time of discharge.  Patient is now medically appropriate for discharge home, will need to follow-up with PCP within 1 week of discharge for posthospitalization evaluation.  Patient to follow-up with urology as above.        Procedures Performed     none    Consults:   Consults     No orders found from 6/29/2017 to 7/29/2017.          Pertinent Test Results:  Component      Latest Ref Rng & Units 7/28/2017 7/28/2017           9:44 PM  9:44 PM   WBC      3.50 - 10.80 10*3/mm3 6.36    RBC      4.20 - 5.76 10*6/mm3 5.24    Hemoglobin      13.1 - 17.5 g/dL 13.1    Hematocrit      38.9 - 50.9 % 41.8    MCV      80.0 - 99.0 fL 79.8 (L)    MCH      27.0 - 31.0 pg 25.0 (L)    MCHC      32.0 - 36.0 g/dL 31.3 (L)    RDW      11.3 - 14.5 % 19.7 (H)    RDW-SD      37.0 - 54.0 fl 58.2 (H)    MPV      6.0 - 12.0 fL 9.3    Platelets      150 - 450 10*3/mm3 232    Neutrophil %      41.0 - 71.0 % 62.8    Lymphocyte %      24.0 - 44.0 % 22.3 (L)    Monocyte %      0.0 - 12.0 % 12.6 (H)    Eosinophil %      0.0 - 3.0 % 1.3    Basophil %      0.0 - 1.0 % 0.8    Immature Grans %      0.0 - 0.6 % 0.2    Neutrophils, Absolute      1.50 - 8.30 10*3/mm3 4.00    Lymphocytes, Absolute      0.60 - 4.80 10*3/mm3 1.42    Monocytes, Absolute      0.00 - 1.00 10*3/mm3 0.80    Eosinophils, Absolute      0.00 - 0.30 10*3/mm3 0.08    Basophils, Absolute      0.00 - 0.20 10*3/mm3 0.05    Immature Grans, Absolute      0.00 - 0.03 10*3/mm3 0.01    Glucose      70 - 100 mg/dL 129 (H)    BUN      9 - 23 mg/dL 30 (H)    Creatinine      0.60 - 1.30  mg/dL 0.60    Sodium      132 - 146 mmol/L 136    Potassium      3.5 - 5.5 mmol/L 4.5    Chloride      99 - 109 mmol/L 103    CO2      20.0 - 31.0 mmol/L 29.0    Calcium      8.7 - 10.4 mg/dL 7.9 (L)    Total Protein      5.7 - 8.2 g/dL 5.9    Albumin      3.20 - 4.80 g/dL 2.80 (L)    ALT (SGPT)      7 - 40 U/L 35    AST (SGOT)      0 - 33 U/L 46 (H)    Alkaline Phosphatase      25 - 100 U/L 137 (H)    Total Bilirubin      0.3 - 1.2 mg/dL 0.3    eGFR Non African Amer      >60 mL/min/1.73 128    Globulin      gm/dL 3.1    A/G Ratio      1.5 - 2.5 g/dL 0.9 (L)    BUN/Creatinine Ratio      7.0 - 25.0 50.0 (H)    Anion Gap      3.0 - 11.0 mmol/L 4.0    Blood Culture       No growth at 4 days No growth at 4 days   Lactate, Venous      0.5 - 2.0 mmol/L 1.3    Procalcitonin      ng/mL 0.06      Component      Latest Ref Rng & Units 7/29/2017 7/30/2017 8/1/2017               WBC      3.50 - 10.80 10*3/mm3  5.58    RBC      4.20 - 5.76 10*6/mm3  4.65    Hemoglobin      13.1 - 17.5 g/dL  11.4 (L)    Hematocrit      38.9 - 50.9 %  36.6 (L)    MCV      80.0 - 99.0 fL  78.7 (L)    MCH      27.0 - 31.0 pg  24.5 (L)    MCHC      32.0 - 36.0 g/dL  31.1 (L)    RDW      11.3 - 14.5 %  19.9 (H)    RDW-SD      37.0 - 54.0 fl  57.3 (H)    MPV      6.0 - 12.0 fL  8.9    Platelets      150 - 450 10*3/mm3  189    Neutrophil %      41.0 - 71.0 %  54.7    Lymphocyte %      24.0 - 44.0 %  23.3 (L)    Monocyte %      0.0 - 12.0 %  15.9 (H)    Eosinophil %      0.0 - 3.0 %  5.2 (H)    Basophil %      0.0 - 1.0 %  0.7    Immature Grans %      0.0 - 0.6 %  0.2    Neutrophils, Absolute      1.50 - 8.30 10*3/mm3  3.05    Lymphocytes, Absolute      0.60 - 4.80 10*3/mm3  1.30    Monocytes, Absolute      0.00 - 1.00 10*3/mm3  0.89    Eosinophils, Absolute      0.00 - 0.30 10*3/mm3  0.29    Basophils, Absolute      0.00 - 0.20 10*3/mm3  0.04    Immature Grans, Absolute      0.00 - 0.03 10*3/mm3  0.01    Glucose      70 - 100 mg/dL  108 (H) 94   BUN      " 9 - 23 mg/dL  25 (H) 19   Creatinine      0.60 - 1.30 mg/dL  0.70 0.60   Sodium      132 - 146 mmol/L  137 138   Potassium      3.5 - 5.5 mmol/L  4.9 4.5   Chloride      99 - 109 mmol/L  102 106   CO2      20.0 - 31.0 mmol/L  35.0 (H) 28.0   Calcium      8.7 - 10.4 mg/dL  8.7 8.3 (L)   Total Protein      5.7 - 8.2 g/dL  5.5 (L)    Albumin      3.20 - 4.80 g/dL  2.60 (L) 2.30 (L)   ALT (SGPT)      7 - 40 U/L  19    AST (SGOT)      0 - 33 U/L  19    Alkaline Phosphatase      25 - 100 U/L  100    Total Bilirubin      0.3 - 1.2 mg/dL  0.3    eGFR Non African Amer      >60 mL/min/1.73  107 128   Globulin      gm/dL  2.9    A/G Ratio      1.5 - 2.5 g/dL  0.9 (L)    BUN/Creatinine Ratio      7.0 - 25.0  35.7 (H) 31.7 (H)   Anion Gap      3.0 - 11.0 mmol/L  0.0 (L) 4.0   Phosphorus      2.4 - 5.1 mg/dL   3.7   Color, UA      Yellow, Straw Yellow     Appearance, UA      Clear Cloudy (A)     pH, UA      5.0 - 8.0 <=5.0     Specific Gravity, UA      1.001 - 1.030 1.023     Glucose, UA      Negative Negative     Ketones, UA      Negative Negative     Bilirubin, UA      Negative Negative     Blood, UA      Negative Large (3+) (A)     Protein, UA      Negative Trace (A)     Leuk Esterase, UA      Negative Large (3+) (A)     Nitrite, UA      Negative Negative     Urobilinogen, UA      0.2 - 1.0 E.U./dL 0.2 E.U./dL     RBC, UA      None Seen, 0-2 /HPF Too Numerous to Count (A)     WBC, UA      None Seen /HPF Too Numerous to Count (A)     Bacteria, UA      None Seen, Trace /HPF None Seen     Squamous Epithelial Cells, UA      None Seen, 0-2 /HPF None Seen     Hyaline Casts, UA      0 - 6 /LPF None Seen     Methodology:       Manual Light Microscopy     C-Reactive Protein      0.00 - 1.00 mg/dL  5.03 (H)      Condition on Discharge:  stable    Physical Exam on Discharge:/63  Pulse 59  Temp 97.7 °F (36.5 °C) (Oral)   Resp 18  Ht 74\" (188 cm)  Wt 269 lb 12.8 oz (122 kg)  SpO2 97%  BMI 34.64 kg/m2  Physical Exam    Gen. " appearance: Pleasant  male resting in bed, appears in NAD He is awake and alert.   Chest: Clear to auscultation bilaterally.  No wheezing, rales or rhonchi  Cardiovascular: RRR, No murmurs rubs or gallop   Abdomen: Soft. Non tender to palpation.  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 Bilateral upper extremity weakness right greater than left.  Bilateral lower extremity motor deficits 4/5 left greater than right.  Psychiatric: cheerful, cooperative  Skin: warm and dry  Discharge Disposition      Discharge Medications   Calderon Scott   Home Medication Instructions DAR:[id number]    Printed on:08/02/17 1241   Medication Information                      calcium carbonate (TUMS) 500 MG chewable tablet  Chew 1 tablet Daily.             cholecalciferol (VITAMIN D3) 1000 UNITS tablet  Take 1 tablet by mouth 2 (Two) Times a Day.             Digestive Enzymes (ACIDOLL) capsule  Take  by mouth.             famotidine (PEPCID) 20 MG tablet  Take 1 tablet by mouth Daily.             finasteride (PROSCAR) 5 MG tablet  Take 1 tablet by mouth Daily.             gabapentin (NEURONTIN) 400 MG capsule  Take 1 capsule by mouth 3 (Three) Times a Day.             Glucosamine-Chondroit-Vit C-Mn (GLUCOSAMINE CHONDR 1500 COMPLX PO)  Take  by mouth.             methenamine (HIPREX) 1 G tablet  Take 1 tablet by mouth 2 (Two) Times a Day.             metoprolol tartrate (LOPRESSOR) 50 MG tablet  Take 1 tablet by mouth 2 (Two) Times a Day.             Multiple Vitamins-Minerals (MULTIVITAMIN ADULT PO)  Take  by mouth.             nystatin (MYCOSTATIN) 018301 UNIT/GM ointment  Apply  topically Every 12 (Twelve) Hours.             oxyCODONE-acetaminophen (PERCOCET)  MG per tablet  1 by mouth every 4-6 hours as needed for pain             simvastatin (ZOCOR) 5 MG tablet  Take 1 tablet by mouth  Every Night.             tamsulosin (FLOMAX) 0.4 MG capsule 24 hr capsule  Take 1 capsule by mouth Daily.             vitamin C (VITAMIN C) 500 MG tablet  Take 1 tablet by mouth Daily.             XARELTO 20 MG tablet  Take 1 tablet by mouth Daily.                 Discharge Diet:   Diet Instructions     Advance Diet As Tolerated                     Discharge Care Plan / Instructions:    Activity at Discharge:   Activity Instructions     Activity as Tolerated                     Follow-up Appointments  Future Appointments  Date Time Provider Department Center   8/8/2017 10:00 AM [facility] None         Test Results Pending at Discharge   Order Current Status    Blood Culture Preliminary result    Blood Culture Preliminary result           Casie M Mayne, PA-C 08/02/17 12:41 PM    Time: Discharge 40 min    Please note that portions of this note may have been completed with a voice recognition program. Efforts were made to edit the dictations, but occasionally words are mistranscribed.

## 2017-08-02 NOTE — PLAN OF CARE
Problem: Patient Care Overview (Adult)  Goal: Plan of Care Review  Outcome: Ongoing (interventions implemented as appropriate)    08/02/17 1000   Coping/Psychosocial Response Interventions   Plan Of Care Reviewed With patient   Outcome Evaluation   Outcome Summary/Follow up Plan BLE edema improving with light compression. hydrofera blue helping to decrease bioburden of wound to improve healing potential.          Problem: Inpatient Physical Therapy  Goal: Wound Care Goal 1 LTG- PT  Outcome: Ongoing (interventions implemented as appropriate)    07/31/17 1015   Wound Care PT LTG   Wound Care PT LTG 1, Date Established 07/31/17   Wound Care PT LTG 1, Time to Achieve 5 - 7 days   Wound Care PT LTG 1, Location R ankle wound   Wound Care PT LTG 1, No S&S of Infection yes   Wound Care PT LTG 1, Decrease Wound Size 25%   Wound Care PT LTG 1, No New Skin Break Down yes   Wound Care PT LTG 1, Education dressing changes;wound care   Wound Care PT LTG 1, Education Understanding verbalize understanding       Goal: Wound Care Goal 2 LTG- PT  Outcome: Ongoing (interventions implemented as appropriate)    07/31/17 1015 08/02/17 1000   Wound Care 2 PT LTG   Wound Care PT LTG 2, Date Established 07/31/17 --    Wound Care PT LTG 2, Time to Achieve 5 - 7 days --    Wound Care PT LTG 2, Location BLE edema --    Wound Care PT LTG 2, Decrease Limb Girth cm 2 --    Wound Care PT LTG 2, No New Skin Break Down yes --    Wound Care PT LTG 2, Education edema management;pressure relief --    Wound Care PT LTG 2, Education Understanding verbalize understanding --    Wound Care PT LTG 2, Outcome --  goal ongoing

## 2017-08-02 NOTE — THERAPY WOUND CARE TREATMENT
Acute Care - Wound/Debridement Treatment Note  [facility]     Patient Name: Calderon Scott  : 1932  MRN: [id number]  Today's Date: 2017  Onset of Illness/Injury or Date of Surgery Date: 17   Date of Referral to PT: 17   Referring Physician: MD Kermit       Admit Date: 2017    Visit Dx:    ICD-10-CM ICD-9-CM   1. Urinary tract infection associated with catheterization of urinary tract, unspecified indwelling urinary catheter type, initial encounter T83.511A 996.64    N39.0 599.0   2. Pleural effusion, left J90 511.9   3. Congestive heart failure, unspecified congestive heart failure chronicity, unspecified congestive heart failure type I50.9 428.0   4. Hypoalbuminemia E88.09 273.8   5. Impaired functional mobility, balance, gait, and endurance Z74.09 V49.89       Patient Active Problem List   Diagnosis   • Essential hypertension   • Congestive heart failure   • Malignant melanoma of torso excluding breast   • Numbness and tingling of hand   • Deep vein thrombosis (DVT) of proximal vein of right lower extremity   • Pre-ulcerative corn or callous   • Chronic a-fib   • Urinary tract infection associated with catheterization of urinary tract   • Lower paraplegia               LDA Wound       17 1000          Wound 17 0415 Right ankle     Wound - Properties Group Date first assessed: 17  -MP Time first assessed: 415MP Side: Right  -MP Location: ankle  -MP Additional Comments: dressing removed from ankle would for assessment, saturated, odorous, drainage  -MP    Wound WDL ex  -MF      Dressing Appearance moist drainage;intact  -MF      Base moist;pink;yellow  -MF      Periwound Area intact;dry;redness;swelling  -MF      Edges open  -MF      Drainage Characteristics/Odor serosanguineous;yellow;malodorous  -MF      Drainage Amount moderate  -MF      Wound Cleaning cleansed with;sterile normal saline  -MF      Dressing foam;low-adherent   hydrofera blue ready to cover  wound with optifoam to secure  -      Wound 07/29/17 0415  abrasion    Wound - Properties Group Date first assessed: 07/29/17  -MP Time first assessed: 0415  -MP Type: abrasion  -MP    Wound 07/31/17 1015 Right upper leg ulceration, venous    Wound - Properties Group Date first assessed: 07/31/17  - Time first assessed: 1015  - Side: Right  - Orientation: upper  - Location: leg  - Type: ulceration, venous  - Additional Comments: nonblanchable, questionable blood blisters v. pressure related area.  -      User Key  (r) = Recorded By, (t) = Taken By, (c) = Cosigned By    Initials Name Provider Type     Anthony Lazaro, PT Physical Therapist    MP Bethany Justice, RN Registered Nurse    VIKASH Luna, MOSES Physical Therapist              Lymphedema       08/02/17 1000          Lymphedema Edema Assessment    Ptting Edema Category By severity  -      Pitting Edema Moderate;Mild  -      Skin Changes/Observations    Location/Assessment Lower Extremity  -      Lower Extremity Conditions left:;intact;bilateral:;dry;shiny;hairless;fragile  -      Lower Extremity Color/Pigment bilateral:;hyperpigmented;blanchable  -      Compression/Skin Care    Compression/Skin Care skin care;wrapping location;bandaging  -      Skin Care washed/dried  -      Wrapping Location lower extremity  -      Wrapping Location LE bilateral:;foot to knee  -      Bandage Layers padding/fluff layer;cotton elastic stocking- double layer (comment size)   size 4 from MH to ankle with size 5 from ankle to knee  -        User Key  (r) = Recorded By, (t) = Taken By, (c) = Cosigned By    Initials Name Provider Type     Anthony Lazaro, PT Physical Therapist          WOUND DEBRIDEMENT                     Adult Rehabilitation Note       08/02/17 1000 08/01/17 1313       Rehab Assessment/Intervention    Discipline physical therapist  - physical therapy assistant  -AS     Document Type therapy note (daily note)  -  therapy note (daily note)  -AS     Subjective Information agree to therapy;complains of;weakness;fatigue  - agree to therapy;complains of;weakness;pain  -AS     Patient Effort, Rehab Treatment  adequate  -AS     Precautions/Limitations  fall precautions  -AS     Recorded by [MF] Anthony Lazaro, PT [AS] Rajni Dawn PTA     Pain Assessment    Pain Assessment Muro-Chi FACES  - 0-10  -AS     Muro-Chi FACES Pain Rating 0  -      Pain Score  5  -AS     Post Pain Score  0  -AS     Pain Type  Chronic pain  -AS     Pain Location  Knee  -AS     Pain Orientation  Right;Left   right>left  -AS     Pain Intervention(s) Repositioned  - Repositioned  -AS     Response to Interventions  tolerated  -AS     Recorded by [MF] Anthony Lazaro, PT [AS] Rajni Dawn PTA     Cognitive Assessment/Intervention    Current Cognitive/Communication Assessment  functional  -AS     Orientation Status  oriented x 4  -AS     Follows Commands/Answers Questions  100% of the time;able to follow single-step instructions  -AS     Personal Safety  WNL/WFL  -AS     Personal Safety Interventions  fall prevention program maintained  -AS     Recorded by  [AS] Rajni Dawn PTA     Bed Mobility, Assessment/Treatment    Bed Mobility, Assistive Device  draw sheet  -AS     Bed Mobility, Roll Left, [place]  verbal cues required;maximum assist (25% patient effort)  -AS     Bed Mobility, Roll Right, [place]  verbal cues required;maximum assist (25% patient effort)  -AS     Bed Mobility, Comment  rolled to position lift sling  -AS     Recorded by  [AS] Rajni Dawn PTA     Transfer Assessment/Treatment    Transfers, Bed-Chair [place]  dependent (less than 25% patient effort);2 person assist required  -AS     Transfers, Bed-Chair-Bed, Assist Device  mechanical lift  -AS     Transfer, Comment  bed<>chair with mechanical lift  -AS     Recorded by  [AS] Rajni Dawn PTA     Gait Assessment/Treatment     Gait, [place] Level  not appropriate to assess  -AS     Recorded by  [AS] Rajni Dawn PTA     Therapy Exercises    Bilateral Lower Extremities  PROM:;AAROM:;10 reps;supine;ankle pumps/circles;heel slides;hip abduction/adduction;SLR   limited knee flexion due to pain and edema  -AS     Recorded by  [AS] Rajni Dawn PTA     Positioning and Restraints    Pre-Treatment Position in bed  - in bed  -AS     Post Treatment Position bed  - chair  -AS     In Bed supine;call light within reach  -      In Chair  reclined;call light within reach;encouraged to call for assist;on mechanical lift sling;legs elevated  -AS     Recorded by [MF] Anthony Lazaro, PT [AS] Rajni Dawn PTA       User Key  (r) = Recorded By, (t) = Taken By, (c) = Cosigned By    Initials Name Effective Dates     Anthony Lazaro, PT 06/19/15 -     AS Rajni Dawn, PTA 06/22/15 -                 IP PT Goals       08/02/17 1000 08/01/17 1341 07/31/17 1015    Bed Mobility PT LTG    Bed Mobility PT LTG, Date Goal Reviewed  08/01/17  -AS     Bed Mobility PT LTG, Outcome  goal ongoing  -AS     Static Sitting Balance PT LTG    Static Sitting Balance PT LTG, Date Goal Reviewed  08/01/17  -AS     Static Sitting Balance PT LTG, Outcome  goal ongoing  -AS     Wound Care PT LTG    Wound Care PT LTG 1, Date Established   07/31/17  -MC    Wound Care PT LTG 1, Time to Achieve   5 - 7 days  -MC    Wound Care PT LTG 1, Location   R ankle wound  -MC    Wound Care PT LTG 1, No S&S of Infection   yes  -MC    Wound Care PT LTG 1, Decrease Wound Size   25%  -MC    Wound Care PT LTG 1, No New Skin Break Down   yes  -MC    Wound Care PT LTG 1, Education   dressing changes;wound care  -MC    Wound Care PT LTG 1, Education Understanding   verbalize understanding  -MC    Wound Care 2 PT LTG    Wound Care PT LTG 2, Date Established   07/31/17  -MC    Wound Care PT LTG 2, Time to Achieve   5 - 7 days  -MC    Wound Care PT LTG 2, Location    BLE edema  -    Wound Care PT LTG 2, Decrease Limb Girth cm   2  -MC    Wound Care PT LTG 2, No New Skin Break Down   yes  -    Wound Care PT LTG 2, Education   edema management;pressure relief  -    Wound Care PT LTG 2, Education Understanding   verbalize understanding  -    Wound Care PT LTG 2, Outcome goal ongoing  -        07/29/17 1621          Bed Mobility PT LTG    Bed Mobility PT LTG, Date Established 07/29/17  -KM      Bed Mobility PT LTG, Time to Achieve 2 wks  -KM      Bed Mobility PT LTG, Activity Type roll left/roll right;supine to sit/sit to supine  -KM      Bed Mobility PT LTG, [place] Level moderate assist (50% patient effort);2 person assist required  -KM      Static Sitting Balance PT LTG    Static Sitting Balance PT LTG, Time to Achieve 2 wks  -KM      Static Sitting Balance PT LTG, [place] Level moderate assist (50% patient effort)  -KM      Static Sitting Balance PT LTG, Assist Device UE Support  -KM        User Key  (r) = Recorded By, (t) = Taken By, (c) = Cosigned By    Initials Name Provider Type     Anthony Lazaro, PT Physical Therapist     Mariela Romero, PT Physical Therapist    AS Rajni Dawn, PTA Physical Therapy Assistant     Ashley Luna, PT Physical Therapist          Physical Therapy Education     Title: PT OT SLP Therapies (Active)     Topic: Physical Therapy (Active)     Point: Mobility training (Active)    Learning Progress Summary    Learner Readiness Method Response Comment Documented by Status   Patient Acceptance E NR  AS 08/01/17 1341 Active    Acceptance E VU  CM 08/01/17 0100 Done    Acceptance E VU  CM 07/31/17 0229 Done    Acceptance E VU  KM 07/29/17 1620 Done               Point: Home exercise program (Active)    Learning Progress Summary    Learner Readiness Method Response Comment Documented by Status   Patient Acceptance E NR  AS 08/01/17 1341 Active    Acceptance E VU  CM 08/01/17 0100 Done    Acceptance E VU  CM  07/31/17 0229 Done    Acceptance E VU  KM 07/29/17 1620 Done               Point: Body mechanics (Active)    Learning Progress Summary    Learner Readiness Method Response Comment Documented by Status   Patient Acceptance E NR  AS 08/01/17 1341 Active    Acceptance E VU  CM 08/01/17 0100 Done    Acceptance E VU  CM 07/31/17 0229 Done    Acceptance E VU  KM 07/29/17 1620 Done               Point: Precautions (Active)    Learning Progress Summary    Learner Readiness Method Response Comment Documented by Status   Patient Acceptance E NR  AS 08/01/17 1341 Active    Acceptance E VU  CM 08/01/17 0100 Done    Acceptance E VU  CM 07/31/17 0229 Done    Acceptance E VU  KM 07/29/17 1620 Done                      User Key     Initials Effective Dates Name Provider Type Discipline     06/19/15 -  Mariela Romero, PT Physical Therapist PT    AS 06/22/15 -  Rajni Dawn, PTA Physical Therapy Assistant PT     06/16/17 -  GHADA Krishnamurthy Registered Nurse Nurse                   PT ASSESSMENT (last 72 hours)      PT Evaluation       08/02/17 1000 08/01/17 1313    Rehab Evaluation    Document Type therapy note (daily note)  - therapy note (daily note)  -AS    Subjective Information agree to therapy;complains of;weakness;fatigue  - agree to therapy;complains of;weakness;pain  -AS    Patient Effort, Rehab Treatment  adequate  -AS    General Information    Precautions/Limitations  fall precautions  -AS    Pain Assessment    Pain Assessment Muro-Chi FACES  - 0-10  -AS    Muro-Chi FACES Pain Rating 0  -     Pain Score  5  -AS    Post Pain Score  0  -AS    Pain Type  Chronic pain  -AS    Pain Location  Knee  -AS    Pain Orientation  Right;Left   right>left  -AS    Pain Intervention(s) Repositioned  -MF Repositioned  -AS    Response to Interventions  tolerated  -AS    Cognitive Assessment/Intervention    Current Cognitive/Communication Assessment  functional  -AS    Orientation Status  oriented x 4  -AS     Follows Commands/Answers Questions  100% of the time;able to follow single-step instructions  -AS    Personal Safety  WNL/WFL  -AS    Personal Safety Interventions  fall prevention program maintained  -AS    Bed Mobility, Assessment/Treatment    Bed Mobility, Assistive Device  draw sheet  -AS    Bed Mobility, Roll Left, [place]  verbal cues required;maximum assist (25% patient effort)  -AS    Bed Mobility, Roll Right, [place]  verbal cues required;maximum assist (25% patient effort)  -AS    Bed Mobility, Comment  rolled to position lift sling  -AS    Transfer Assessment/Treatment    Transfers, Bed-Chair [place]  dependent (less than 25% patient effort);2 person assist required  -AS    Transfers, Bed-Chair-Bed, Assist Device  mechanical lift  -AS    Transfer, Comment  bed<>chair with mechanical lift  -AS    Gait Assessment/Treatment    Gait, [place] Level  not appropriate to assess  -AS    Therapy Exercises    Bilateral Lower Extremities  PROM:;AAROM:;10 reps;supine;ankle pumps/circles;heel slides;hip abduction/adduction;SLR   limited knee flexion due to pain and edema  -AS    Positioning and Restraints    Pre-Treatment Position in bed  - in bed  -AS    Post Treatment Position bed  - chair  -AS    In Bed supine;call light within reach  -     In Chair  reclined;call light within reach;encouraged to call for assist;on mechanical lift sling;legs elevated  -AS      07/31/17 1428 07/31/17 1015    Rehab Evaluation    Document Type  evaluation;therapy note (daily note)   wound care  -    Subjective Information  no complaints;agree to therapy  -    General Information    Patient Profile Review  yes  -    Onset of Illness/Injury or Date of Surgery Date  07/28/17  -    Referring Physician  MD Kermit  -    Pertinent History Of Current Problem  See PT mobility note. Pt came in with BLE compression stockings. Has had them maintained since his admission at [facility].  -    Equipment Currently Used  at Home hospital bed;wheelchair;lift device  -     Plans/Goals Discussed With  patient;agreed upon  -    Risks Reviewed  patient:;increased discomfort  -    Benefits Reviewed  patient:;improve skin integrity  -    Barriers to Rehab  medically complex;previous functional deficit  -    Living Environment    Lives With child(amanda), adult  -     Living Arrangements house  -[place]     Transportation Available other (see comments)   TElls me his granddaughter makes transportation arrangements  -     Clinical Impression    Date of Referral to PT  07/30/17  -    PT Diagnosis  Impaired skin integrity  -    Patient/Family Goals Statement  Pt would like to go back home.  -    Criteria for Skilled Therapeutic Interventions Met  yes;treatment indicated  -    Rehab Potential  fair, will monitor progress closely  -    Pain Assessment    Pain Assessment  No/denies pain  -    Cognitive Assessment/Intervention    Current Cognitive/Communication Assessment  functional  -    Orientation Status  oriented x 4  -    Positioning and Restraints    Pre-Treatment Position  in bed  -    Post Treatment Position  bed  -MC    In Bed  supine;call light within reach;encouraged to call for assist;with other staff;exit alarm on;RLE elevated;LLE elevated  -      User Key  (r) = Recorded By, (t) = Taken By, (c) = Cosigned By    Initials Name Provider Type     Anthony Lazaro, PT Physical Therapist    AS Rajni Dawn, NICK Physical Therapy Assistant     Ashley Luna, PT Physical Therapist    SE Marjorie Carlin, LEROY Case Manager            PT Recommendation and Plan  Anticipated Discharge Disposition: home with home health  PT Frequency: 2-3 times/wk    Plan Of Care Reviewed With: patient       Outcome Summary/Follow up Plan: BLE edema improving with light compression. hydrofera blue helping to decrease bioburden of wound to improve healing potential.           Outcome Measures       08/01/17 1313          How  much help from another person do you currently need...    Turning from your back to your side while in flat bed without using bedrails? 2  -AS      Moving from lying on back to sitting on the side of a flat bed without bedrails? 1  -AS      Moving to and from a bed to a chair (including a wheelchair)? 1  -AS      Standing up from a chair using your arms (e.g., wheelchair, bedside chair)? 1  -AS      Climbing 3-5 steps with a railing? 1  -AS      To walk in hospital room? 1  -AS      AM-PAC 6 Clicks Score 7  -AS      Functional Assessment    Outcome Measure Options AM-PAC 6 Clicks Basic Mobility (PT)  -AS        User Key  (r) = Recorded By, (t) = Taken By, (c) = Cosigned By    Initials Name Provider Type    AS Rajni Dawn PTA Physical Therapy Assistant              Time Calculation        PT Charges       08/02/17 1000          Time Calculation    Start Time 1000  -      PT Goal Re-Cert Due Date 08/08/17  -      Time Calculation- PT    Total Timed Code Minutes- PT 25 minute(s)  -        User Key  (r) = Recorded By, (t) = Taken By, (c) = Cosigned By    Initials Name Provider Type     Anthony Lazaro, PT Physical Therapist             Therapy Charges for Today     Code Description Service Date Service Provider Modifiers Qty    [id number] HC PT MULTI LAYER COMP SYS BELOW KNEE 8/2/2017 Anthony Lazaro, PT GP 1            PT G-Codes  Outcome Measure Options: AM-PAC 6 Clicks Basic Mobility (PT)  Score: 6  Functional Limitation: Changing and maintaining body position  Mobility: Walking and Moving Around Current Status (): 100 percent impaired, limited or restricted  Mobility: Walking and Moving Around Goal Status (): At least 80 percent but less than 100 percent impaired, limited or restricted        Anthony Lazaro, PT  8/2/2017

## 2017-08-02 NOTE — DISCHARGE PLACEMENT REQUEST
"Marion Mckeon (84 y.o. Male)     From Marjorie Carlin RN, [phone]    Date of Birth Social Security Number Address Home Phone MRN    1932  [facility] [place] [address] [phone] [id number]    Buddhism Marital Status          None        Admission Date Admission Type Admitting Provider Attending Provider Department, Room/Bed    17 Emergency Chelsie Cardozo MD Krill, Kaleigh, MD [address] GYN, N545/1    Discharge Date Discharge Disposition Discharge Destination         Home or Self Care             Attending Provider: Chelsie Cardozo MD     Allergies:  Penicillins    Isolation:  Contact   Infection:  MDR Pseudomonas (17)   Code Status:  Conditional    Ht:  74\" (188 cm)   Wt:  269 lb 12.8 oz (122 kg)    Admission Cmt:  None   Principal Problem:  None                Active Insurance as of 2017     Primary Coverage     Payor Plan Insurance Group Employer/Plan Group    ANTHEM MEDICARE REPLACEMENT ANTHEM MEDICARE ADVANTAGE [id number]     Payor Plan Address Payor Plan Phone Number Effective From Effective To    [address] [phone] 2017     [place] [address]       Subscriber Name Subscriber Birth Date Member ID       MARION MCKEON 1932 [id number]                 Emergency Contacts      (Rel.) Home Phone Work Phone Mobile Phone    Roderick Mckeon (Son) [phone] -- --            Insurance Information                ANTHEM MEDICARE REPLACEMENT/ANTHEM MEDICARE ADVANTAGE Phone: [phone]    Subscriber: Marion Mckeon Subscriber#: [id number]    Group#: [id number] Precert#:             [address] GYN  [address] [facility] [address]  Phone:  [phone]  Fax:          Patient:     Marion Mckeon MRN:  [id number]   [address] [facility] [address] :  1932  SSN:    Phone: [phone] Sex:  M      INSURANCE PAYOR PLAN GROUP # SUBSCRIBER ID   Primary: ELDA " MEDICARE REPLACEMENT [id number]   Admitting Diagnosis: Urinary tract infection associated with catheterization of urinary tract, unspecified indwelling urinary catheter type, initial encounter [T83.511A, N39.0]  Order Date:  Aug 2, 2017               Notify [facility]       (Order ID: [id number])     Diagnosis:         Priority:  Routine Expected Date:   Expiration Date:        Interval:  Until Discontinued Count:    Comments: Resume orders for nursing for owusu care, leg wound care, PT, OT and home aid        Specimen Type:   Specimen Source:   Specimen Taken Date:   Specimen Taken Time:                         Verbal Order Mode: Telephone with readback   Authorizing Provider: Chelsie Cardozo MD  Authorizing Provider's NPI: [id number]     Order Entered By: Marjorie Carlin RN 8/2/2017 12:05 PM     Electronically signed by:                  History & Physical      Shar Mcarthur MD at 7/29/2017  3:20 AM          CHIEF COMPLAINT: Sent from PCP office for a drug resistant Pseudomonas Aeruginosa obtained from chronic indwelling owusu catheter in an asymptomatic patient    HPI  This is a delightful 84-year-old obese  male with a past medical history significant for long-standing essential hypertension, chronic atrial fibrillation, who has been bedridden since February 2017 due to spinal stenosis and multiple herniated disc of the lumbar spine per patient further complicated by urinary retention unclear if due to neurogenic bladder or bladder outlet obstruction from enlarged prostate  The patient states that he has been evaluated at [facility] neurosurgery, orthopedic surgery and urology and deemed to be not a surgical candidate    The patient has a chronic indwelling Owusu catheter that is replaced every 4 weeks.  He states that he's had multiple antibiotic courses for presumed urinary tract infection often drawn from his Owusu bag without any urinary symptoms or systemic signs  of infection    On Wednesday, July 26, 2017 the patient had a routine home visit for a Oneal catheter exchange.  Shortly thereafter the patient was rolling in bed and accidentally pulled on the Oneal catheter and had leakage around the catheter.  For some reason a urine sample was sent then and grew pseudomonas aeruginosa resistant to oral antibiotics and he received a phone call from his primary care physician to present to the emergency room tonight    The patient denies any fever, pelvic pain, flank pain, nausea, vomiting or any signs of infection.  As a matter fact he states that he feels great and is not sure why he is in the ER    Past medical history  Essential hypertension, chronic atrial fibrillation, lumbar spinal stenosis and possible cervical myelopathy, bedridden since February 2017, urinary retention with chronic indwelling Oneal catheter since then    Past surgical history  Left knee surgery    Medications  Home medications reviewed and reconciled    Allergies  Penicillin causes rash    Social history  The patient does not smoke, drink or use any illicits    Family history  Reviewed and noncontributory to presenting illness    Review of systems  14 systems were reviewed and are negative except as noted in the history of present illness section    Physical examination  Vital signs reviewed and within normal range  Gen. appearance: Pleasant  male in no distress. He is awake and alert. he is oriented to person place and time  HEENT: Pupils reactive and responsive to light. Extraocular muscles are intact. Dry mucous membrane: Diminished skin turgor. No oral lesions thrush.  Chest: Clear to auscultation bilaterally.  No wheezing, rales or rhonchi  Cardiovascular: Regular rate and rhythm. No murmurs rubs or gallop no increased jugular venous pulsation  Abdomen: Soft. Non tender to palpation. No palpable masses. No CVA tenderness. Normal bowel sounds.   Extremities: Venous stasis dermatitis. Intact  peripheral pulses  Neurologic examination: Bilateral upper extremity weakness right greater than left.  Bilateral lower extremity motor deficits 4/5 left greater than right.  Bilateral venous stasis dermatitis with chronic occlusive dressings  Psychiatric: appropriate affect    Laboratory data  Creatinine 0.6.  Electrolytes within normal range.  High BUN to creatinine ratio at 50-1  Low albumin at 2.8  Increased alkaline phosphatase of 137  Normal white blood cell count  Urinalysis shows 3+ leukocytes esterase.  Urine microscopic examination shows 200s to count red blood cells.  2 numerous to count white blood cells    Assessment and plan  84-year-old obese  male with history of hypertension, chronic persistent atrial fibrillation who has been bedridden since February 2017 as a complication of lumbar disc disease with spinal stenosis and possible cervical myelopathy with extensive evaluation at the [facility] (data deficient)  He has since then maintain a chronic indwelling Oneal catheter possibly due to neurogenic bladder versus bladder outlet obstruction from enlarged prostate (data deficient) and has unfortunately received multiple courses of antibiotics based on frequent urine cultures sent from his Oneal catheter without any symptoms of urinary tract infection  Now sent from his primary care physician's office for a urine culture showing pseudomonas aeruginosa multiple drug resistant    1.  Chronic asymptomatic bacterial colonization with resistant bacteria due to multiple antibiotic courses exposure  The patient does not have evidence of urinary tract infection.  Specifically no fever, pelvic pain, flank pain, leukocytosis or bacteria in the urine  I have educated the patient on the importance of not checking urine culture in a patient with chronic indwelling Oneal catheter unless he is having local or systemic symptoms of infection  I also informed of the risks of repeated courses of  "antibiotic without appropriate indication specifically selection of multidrug-resistant bacteria and the possibility of development of Clostridium difficile colitis  Plan  No need for antibiotic treatment.  This is simply a asymptomatic bacterial colonization  Consider discontinuation of Owusu catheter and trial spontaneous voiding  Should the patient fail this trial he might benefit from a suprapubic tube catheter placement    2.  Chronic severe malnutrition  3.  Essential hypertension.  Discontinue lisinopril.  Continue metoprolol  4.  History of chronic atrial fibrillation.  Continue metoprolol and Xarelto  5.  History of bladder outlet obstruction.  Continue tamsulosin and finasteride  6.   High BUN to creatinine ratio with physical examination consistent with volume depletion.  I'm not sure why the patient is on furosemide 20 mg twice a day.  He denies any history of heart failure.  Discontinue furosemide and lisinopril  7.  Profound weakness and debility, the patient is currently bedridden     Electronically signed by Shar Mcarthur MD at 7/29/2017  3:36 AM           Physician Progress Notes (most recent note)      Jacky Wolf MD at 8/2/2017  8:04 AM  Version 2 of 2         [facility] Progress Note    Admission Date: 7/28/2017    Calderon Scott  11/25/1932  [id number]    Date: 8/2/2017    Meds:    IV Anti-Infectives     None          CC: UTI      SUBJECTIVE:7/31/17: Patient is a 84 y.o. male, with a chronic indwelling owusu catheter since 2/2017, had the owusu changed by home health last week.  Several days later the owusu got \"caught\" on his leg, was pulled and began leaking.  A urinalysis was sent, and he was notified to present to the ED for IV antibiotics.  He denies any fever, (99 degrees prior to admission) no  shaking chills, pelvic pressure.  He received 2 doses of Cefdinir, and was changed to Cefepime. We were consulted for antibiotic management.  He has been seen by urology at [facility], and is " "scheduled for epidural injections tomorrow at .   17:  Up in chair.  No complaints of nausea, diarrhea, fever \"feel fine\"  17:  Hoping to get to go home.  No fever,chillsl.     ROS:  No f/c/s. No n/v/d. No rash. No new ADR to Abx.     PE:   Vital Signs  Temp (24hrs), Av °F (36.7 °C), Min:97.7 °F (36.5 °C), Max:98.2 °F (36.8 °C)    Temp  Min: 97.7 °F (36.5 °C)  Max: 98.2 °F (36.8 °C)  BP  Min: 144/79  Max: 148/63  Pulse  Min: 59  Max: 65  Resp  Min: 18  Max: 18  SpO2  Min: 97 %  Max: 97 %    GENERAL: Awake and alert, in no acute distress.   HEENT:  No conjunctival injection. No icterus. Oropharynx clear without evidence of thrush or exudate.  NECK: Supple, no JVD     HEART: RRR; No murmur, rubs, gallops.   LUNGS: Clear to auscultation bilaterally without wheezing, rales, rhonchi. Normal respiratory effort. Nonlabored. No dullness.  ABDOMEN: Soft, nontender, nondistended. Positive bowel sounds. No rebound or guarding. NO mass or HSM.  EXT:  No cyanosis, clubbing or edema. No cord.  : Genitalia generally unremarkable. + owusu   SKIN: Warm and dry without cutaneous eruptions on Inspection/palpation.    NEURO: Alert and oriented x 3, Motor 5/5 bilaterally    Laboratory Data      Results from last 7 days  Lab Units 17  0545 17  2144   WBC 10*3/mm3 5.58 6.36   HEMOGLOBIN g/dL 11.4* 13.1   HEMATOCRIT % 36.6* 41.8   PLATELETS 10*3/mm3 189 232       Results from last 7 days  Lab Units 17  0513   SODIUM mmol/L 138   POTASSIUM mmol/L 4.5   CHLORIDE mmol/L 106   CO2 mmol/L 28.0   BUN mg/dL 19   CREATININE mg/dL 0.60   GLUCOSE mg/dL 94   CALCIUM mg/dL 8.3*       Results from last 7 days  Lab Units 17  0545   ALK PHOS U/L 100   BILIRUBIN mg/dL 0.3   ALT (SGPT) U/L 19   AST (SGOT) U/L 19           Results from last 7 days  Lab Units 17  0545   CRP mg/dL 5.03*       Results from last 7 days  Lab Units 17  2144   LACTATE mmol/L 1.3             Estimated Creatinine Clearance: 95.4 " mL/min (by C-G formula based on Cr of 0.6).    Microbiology:  Blood Culture   Date Value Ref Range Status   07/28/2017 No growth at 3 days  Preliminary   07/28/2017 No growth at 3 days  Preliminary                    Urine Culture   Date Value Ref Range Status   07/28/2017 >100,000 CFU/mL Pseudomonas aeruginosa (A)  Final     Comment:     Multi drug resistant Pseudomonas, patient may be an isolation risk.          Radiology:  Imaging Results (last 72 hours)     ** No results found for the last 72 hours. **              IMPRESSION:  1.   Pseudomonas UTI-with an indwelling Oneal catheter in place.  It has been somewhat difficult to determine if this was actually asymptomatic UTI or asymptomatic bacteriuria/colonization.  He does appear to have had a fever shortly prior to admission that may have been secondary to UTI.  I think that we should treat him relatively briefly.  There are no oral options for therapy.  I will plan to leave him on cefepime until Wednesday a.m.  It is unclear if he just has the Oneal catheter and due to immobility or if he actually had urinary obstruction or urinary retention.  I have suggested that he see [facility] in follow-up in the near future.  I think that an attempt should be made to remove his catheter in order to lower his risk for recurrent resistant organism UTI.   2.  Afib  3.  CHF  4,. Herniated discs     PLAN/RECOMMENDATIONS:   1.   Dc Cefepime   2.  Follow-up with [facility] with  the goal of removing his Oneal catheter  3.   Discharge today        Dr. Wolf has obtained the history, performed the physical exam and formulated the above treatment plan.     I discussed his disposition with case management today.    Jacky Wolf MD  8/2/2017  11:34 AM         Electronically signed by Jacky Wolf MD at 8/2/2017 11:34 AM      BAL Colin at 8/2/2017  8:04 AM  Version 1 of 2         [facility] Progress Note    Admission Date: 7/28/2017    Calderon ROSENTHAL  "Tyler  1932  [id number]    Date: 2017    Meds:    IV Anti-Infectives     Ordered     Dose/Rate Route Frequency Start Stop    17 1556  cefepime (MAXIPIME) 1 g/100 mL 0.9% NS IVPB (mbp)     Ordering Provider:  Jacky Wolf MD    1 g Intravenous Every 8 Hours 17 2200            CC: UTI      SUBJECTIVE:17: Patient is a 84 y.o. male, with a chronic indwelling owusu catheter since 2017, had the owusu changed by [facility] last week.  Several days later the owusu got \"caught\" on his leg, was pulled and began leaking.  A urinalysis was sent, and he was notified to present to the ED for IV antibiotics.  He denies any fever, (99 degrees prior to admission) no  shaking chills, pelvic pressure.  He received 2 doses of Cefdinir, and was changed to Cefepime. We were consulted for antibiotic management.  He has been seen by urology at [facility], and is scheduled for epidural injections tomorrow at .   17:  Up in chair.  No complaints of nausea, diarrhea, fever \"feel fine\"  17:  Hoping to get to go home.  No fever,chillsl.     ROS:  No f/c/s. No n/v/d. No rash. No new ADR to Abx.     PE:   Vital Signs  Temp (24hrs), Av °F (36.7 °C), Min:97.7 °F (36.5 °C), Max:98.2 °F (36.8 °C)    Temp  Min: 97.7 °F (36.5 °C)  Max: 98.2 °F (36.8 °C)  BP  Min: 144/79  Max: 148/63  Pulse  Min: 59  Max: 65  Resp  Min: 18  Max: 18  SpO2  Min: 97 %  Max: 97 %    GENERAL: Awake and alert, in no acute distress.   HEENT:  No conjunctival injection. No icterus. Oropharynx clear without evidence of thrush or exudate.  NECK: Supple, no JVD     HEART: RRR; No murmur, rubs, gallops.   LUNGS: Clear to auscultation bilaterally without wheezing, rales, rhonchi. Normal respiratory effort. Nonlabored. No dullness.  ABDOMEN: Soft, nontender, nondistended. Positive bowel sounds. No rebound or guarding. NO mass or HSM.  EXT:  No cyanosis, clubbing or edema. No cord.  : Genitalia generally unremarkable. + owusu   SKIN: Warm " and dry without cutaneous eruptions on Inspection/palpation.    NEURO: Alert and oriented x 3, Motor 5/5 bilaterally    Laboratory Data      Results from last 7 days  Lab Units 07/30/17  0545 07/28/17  2144   WBC 10*3/mm3 5.58 6.36   HEMOGLOBIN g/dL 11.4* 13.1   HEMATOCRIT % 36.6* 41.8   PLATELETS 10*3/mm3 189 232       Results from last 7 days  Lab Units 08/01/17  0513   SODIUM mmol/L 138   POTASSIUM mmol/L 4.5   CHLORIDE mmol/L 106   CO2 mmol/L 28.0   BUN mg/dL 19   CREATININE mg/dL 0.60   GLUCOSE mg/dL 94   CALCIUM mg/dL 8.3*       Results from last 7 days  Lab Units 07/30/17  0545   ALK PHOS U/L 100   BILIRUBIN mg/dL 0.3   ALT (SGPT) U/L 19   AST (SGOT) U/L 19           Results from last 7 days  Lab Units 07/30/17  0545   CRP mg/dL 5.03*       Results from last 7 days  Lab Units 07/28/17  2144   LACTATE mmol/L 1.3             Estimated Creatinine Clearance: 95.4 mL/min (by C-G formula based on Cr of 0.6).    Microbiology:  Blood Culture   Date Value Ref Range Status   07/28/2017 No growth at 3 days  Preliminary   07/28/2017 No growth at 3 days  Preliminary                    Urine Culture   Date Value Ref Range Status   07/28/2017 >100,000 CFU/mL Pseudomonas aeruginosa (A)  Final     Comment:     Multi drug resistant Pseudomonas, patient may be an isolation risk.          Radiology:  Imaging Results (last 72 hours)     ** No results found for the last 72 hours. **              IMPRESSION:  1.   Pseudomonas UTI-with an indwelling Oneal catheter in place.  It has been somewhat difficult to determine if this was actually asymptomatic UTI or asymptomatic bacteriuria/colonization.  He does appear to have had a fever shortly prior to admission that may have been secondary to UTI.  I think that we should treat him relatively briefly.  There are no oral options for therapy.  I will plan to leave him on cefepime until Wednesday a.m.  It is unclear if he just has the Oneal catheter and due to immobility or if he actually  "had urinary obstruction or urinary retention.  I have suggested that he see  urology in follow-up in the near future.  I think that an attempt should be made to remove his catheter in order to lower his risk for recurrent resistant organism UTI.   2.  Afib  3.  CHF  4,. Herniated discs     PLAN/RECOMMENDATIONS:   1.   Dc Cefepime   2.  Follow-up with  urology with  the goal of removing his Owusu catheter  3.  Probable discharge         Dr. Wolf has obtained the history, performed the physical exam and formulated the above treatment plan.     BAL Colin  8/2/2017  8:04 AM         Electronically signed by BAL Colin at 8/2/2017  8:18 AM           Consult Notes (most recent note)      Jacky Wolf MD at 7/31/2017  9:35 AM  Version 2 of 2         INFECTIOUS DISEASE CONSULT/INITIAL HOSPITAL VISIT    Calderon Scott  11/25/1932  [id number]    Date of Consult: 7/31/2017    Admission Date: 7/28/2017      Requesting Provider: No ref. provider found  Evaluating Physician: Jacky Wolf MD    Reason for Consultation: asymptomatic bacteriuria/chronic owusu     History of present illness:    Patient is a 84 y.o. male, with a chronic indwelling owusu catheter since 2/2017, had the owusu changed by home health last week.  Several days later the owusu got \"caught\" on his leg, was pulled and began leaking.  A urinalysis was sent, and he was notified to present to the ED for IV antibiotics.  He denies any fever, (99 degrees prior to admission) no  shaking chills, pelvic pressure.  He received 2 doses of Cefdinir, and was changed to Cefepime. We were consulted for antibiotic management.  He has been seen by urology at [facility], and is scheduled for epidural injections tomorrow at .     He now indicates that he had a fever of 101° prior to admission and his granddaughter apparently reminded him of this fever.    Past Medical History:   Diagnosis Date   • A-fib    • Arthritis    • Back disorder "     patient states he cannot walk or stand due to back pain since february 2017   • Cancer    • CHF (congestive heart failure)    • Deep vein thrombosis    • Fracture     left wrist, left hand > 10 years ago    • Hypertension        Past Surgical History:   Procedure Laterality Date   • KNEE SURGERY     • SKIN BIOPSY     • SKIN CANCER EXCISION      left ear removed, multiple areas removed    • SKIN SURGERY      basal cell and melanoma   • VASECTOMY         Family History   Problem Relation Age of Onset   • Cancer Father    • Arthritis Sister        Social History     Social History   • Marital status:      Spouse name: N/A   • Number of children: N/A   • Years of education: N/A     Occupational History   • Not on file.     Social History Main Topics   • Smoking status: Never Smoker   • Smokeless tobacco: Never Used   • Alcohol use No   • Drug use: No   • Sexual activity: Defer     Other Topics Concern   • Not on file     Social History Narrative       Allergies   Allergen Reactions   • Penicillins Hives         Medication:    Current Facility-Administered Medications:   •  calcium carbonate (TUMS) chewable tablet 500 mg (200 mg elemental), 1 tablet, Oral, Daily, Shar Mcarthur MD, 1 tablet at 07/31/17 0924  •  cefepime (MAXIPIME) 2 g/100 mL 0.9% NS (mbp), 2 g, Intravenous, Q8H, Chelsie Cardozo MD  •  cholecalciferol (VITAMIN D3) tablet 1,000 Units, 1,000 Units, Oral, BID, Shar Mcarthur MD, 1,000 Units at 07/31/17 0924  •  famotidine (PEPCID) tablet 20 mg, 20 mg, Oral, Daily, Shar Mcarthur MD, 20 mg at 07/31/17 0924  •  finasteride (PROSCAR) tablet 5 mg, 5 mg, Oral, Daily, Shar Mcarthur MD, 5 mg at 07/31/17 0924  •  gabapentin (NEURONTIN) capsule 400 mg, 400 mg, Oral, TID, Shar Mcarthur MD, 400 mg at 07/31/17 0924  •  metoprolol tartrate (LOPRESSOR) tablet 50 mg, 50 mg, Oral, BID, Shar Mcarthur MD, 50 mg at 07/31/17 0924  •  ondansetron (ZOFRAN) injection 4 mg, 4 mg,  Intravenous, Q6H PRN, Shar Mcarthur MD  •  oxyCODONE-acetaminophen (PERCOCET)  MG per tablet 1 tablet, 1 tablet, Oral, Q6H PRN, Shar Mcarthur MD, 1 tablet at 07/31/17 0623  •  rivaroxaban (XARELTO) tablet 20 mg, 20 mg, Oral, Daily, Shar Mcarthur MD, 20 mg at 07/29/17 1007  •  saccharomyces boulardii (FLORASTOR) capsule 250 mg, 250 mg, Oral, BID, Gregorio Carmen MD, 250 mg at 07/31/17 0924  •  Insert peripheral IV, , , Once **AND** sodium chloride 0.9 % flush 10 mL, 10 mL, Intravenous, PRN, Wale Pineda PA-C  •  tamsulosin (FLOMAX) 24 hr capsule 0.4 mg, 0.4 mg, Oral, Daily, Shar Mcarthur MD, 0.4 mg at 07/31/17 0924  •  vitamin C (ASCORBIC ACID) tablet 500 mg, 500 mg, Oral, Daily, Gregorio Carmen MD, 500 mg at 07/31/17 0924    Antibiotics:  IV Anti-Infectives     Ordered     Dose/Rate Route Frequency Start Stop    07/31/17 1247  cefepime (MAXIPIME) 2 g/100 mL 0.9% NS (mbp)     Ordering Provider:  Chelsie Cardozo MD    2 g Intravenous Every 8 Hours 07/31/17 1600              Review of Systems:  Constitutional-- No Fever, chills or sweats.  Appetite good, and no malaise. No fatigue.  HEENT-- No new vision, hearing or throat complaints.  No epistaxis or oral sores.  Denies odynophagia or dysphagia. No headache, photophobia or neck stiffness.  CV-- No chest pain, palpitation or syncope  Resp-- No SOB/cough/Hemoptysis  GI- No nausea, vomiting, or diarrhea.  No hematochezia, melena, or hematemesis. Denies jaundice or chronic liver disease.  -- No dysuria, hematuria, or flank pain.  Chronic owusu   Lymph- no swollen lymph nodes in neck/axilla or groin.   Heme- No active bruising or bleeding; no Hx of DVT or PE.  MS-- no swelling or pain in the bones or joints of arms/legs.  No new back pain.  Neuro-- No acute focal weakness or numbness in the arms or legs.  No seizures.  Skin--No rashes or lesions  Chronic venous stasis dermatitis bilaterally       Physical Exam:   Vital Signs  Temp  (24hrs), Av.5 °F (36.9 °C), Min:98 °F (36.7 °C), Max:98.7 °F (37.1 °C)    Temp  Min: 98 °F (36.7 °C)  Max: 98.7 °F (37.1 °C)  BP  Min: 116/63  Max: 139/68  Pulse  Min: 56  Max: 74  Resp  Min: 18  Max: 20  SpO2  Min: 96 %  Max: 98 %    GENERAL: Awake and alert, in no acute distress.   HEENT: Normocephalic, atraumatic.  PERRL. EOMI. No conjunctival injection. No icterus. Oropharynx clear without evidence of thrush or exudate. No evidence of peridontal disease.    NECK: Supple without nuchal rigidity. No mass.  LYMPH: No cervical, axillary or inguinal lymphadenopathy.  HEART: RRR; No murmur, rubs, gallops.   LUNGS: Clear to auscultation bilaterally without wheezing, rales, rhonchi. Normal respiratory effort. Nonlabored. No dullness.  ABDOMEN: Soft, nontender, nondistended. Positive bowel sounds. No rebound or guarding. NO mass or HSM.  EXT:  No cyanosis, clubbing or edema. No cord.  : Genitalia generally unremarkable.   Oneal catheter.  MSK: FROM without joint effusions noted arms/legs.    SKIN: Warm and dry without cutaneous eruptions on Inspection/palpation.    NEURO: Oriented to PPT. No focal deficits on motor/sensory exam at arms/legs.  PSYCHIATRIC: Normal insight and judgement. Cooperative with PE    Laboratory Data      Results from last 7 days  Lab Units 17  0545 17  2144   WBC 10*3/mm3 5.58 6.36   HEMOGLOBIN g/dL 11.4* 13.1   HEMATOCRIT % 36.6* 41.8   PLATELETS 10*3/mm3 189 232       Results from last 7 days  Lab Units 17  0545   SODIUM mmol/L 137   POTASSIUM mmol/L 4.9   CHLORIDE mmol/L 102   CO2 mmol/L 35.0*   BUN mg/dL 25*   CREATININE mg/dL 0.70   GLUCOSE mg/dL 108*   CALCIUM mg/dL 8.7       Results from last 7 days  Lab Units 17  0545   ALK PHOS U/L 100   BILIRUBIN mg/dL 0.3   ALT (SGPT) U/L 19   AST (SGOT) U/L 19           Results from last 7 days  Lab Units 17  0545   CRP mg/dL 5.03*       Results from last 7 days  Lab Units 17  2144   LACTATE mmol/L 1.3              Estimated Creatinine Clearance: 95.4 mL/min (by C-G formula based on Cr of 0.7).      Microbiology:  Blood Culture   Date Value Ref Range Status   07/28/2017 No growth at 2 days  Preliminary   07/28/2017 No growth at 2 days  Preliminary                    Urine Culture   Date Value Ref Range Status   07/28/2017 >100,000 CFU/mL Non-Lactose  (A)  Preliminary       Urine culture:  MDR PSA- sensitivities to be put in computer within next 5 min- called        Radiology:  Imaging Results (last 72 hours)     Procedure Component Value Units Date/Time    XR Chest 1 View [[id number]] Collected:  07/28/17 2135     Updated:  07/28/17 2253    Addenda:        CLINICAL HISTORY addendum:  Shortness of breath, History of congestive heart failure, DVT,   hypertension    THIS DOCUMENT HAS BEEN ELECTRONICALLY SIGNED BY TESSIE KEARNEY MD  Signed:  07/28/17 2252 by Tessie Kearney MD    Narrative:       EXAM:    XR Chest, 1 View    CLINICAL HISTORY:    84 years old, male; Signs and symptoms; Other: UTI; Additional info:     TECHNIQUE:    Frontal view of the chest.    COMPARISON:    No relevant prior studies available.    FINDINGS:    Lungs:  Mild perihilar pulmonary opacities, consider mild interstitial   pulmonary edema.  No lobar consolidation    Pleural space:  Blunted left costophrenic angle.  No pneumothorax.    Heart:  Moderate cardiomediastinal enlargement, correlate for cardiomegaly   versus pericardial effusion.    Mediastinum:  Unremarkable.    Bones/joints:  Unremarkable.  Left axillary surgical clips.      Impression:       1.  Mild perihilar pulmonary opacities, consider mild interstitial pulmonary   edema.    2.  Small left pleural effusion.    THIS DOCUMENT HAS BEEN ELECTRONICALLY SIGNED BY TESSIE KEARNEY MD        I read his chest x-ray.    Impression:   1.   Pseudomonas UTI-with an indwelling Oneal catheter in place.  It has been somewhat difficult to determine if this was actually asymptomatic UTI or asymptomatic  "bacteriuria/colonization.  He does appear to have had a fever shortly prior to admission that may have been secondary to UTI.  I think that we should treat him relatively briefly.  There are no oral options for therapy.  I will plan to leave him on cefepime until Wednesday a.m.  It is unclear if he just has the Owusu catheter and due to immobility or if he actually had urinary obstruction or urinary retention.  I have suggested that he see [facility] in follow-up in the near future.  I think that an attempt should be made to remove his catheter in order to lower his risk for recurrent resistant organism UTI.   2.  Afib  3.  CHF  4,. Herniated discs    PLAN/RECOMMENDATIONS:   Thank you for asking us to see Calderon Scott, I recommend the followin.  Cefepime 1 g IV every 8 hours  2.  Follow-up with [facility] with  the goal of removing his Owusu catheter  3.  Probable discharge on Wednesday     Dr. Wolf has obtained the history, performed the physical exam and formulated the above treatment plan.     I discussed his complex situation with Dr. Cardozo.    Jacky Wolf MD  2017  3:49 PM                   Electronically signed by Jacky Wolf MD at 2017  3:55 PM      BAL Colin at 2017  9:35 AM  Version 1 of 2         INFECTIOUS DISEASE CONSULT/INITIAL HOSPITAL VISIT    Calderon Scott  1932  [id number]    Date of Consult: 2017    Admission Date: 2017      Requesting Provider: No ref. provider found  Evaluating Physician: BAL Colin    Reason for Consultation: asymptomatic bacteriuria/chronic owusu     History of present illness:    Patient is a 84 y.o. male, with a chronic indwelling owusu catheter since 2017, had the owusu changed by home health last week.  Several days later the owusu got \"caught\" on his leg, was pulled and began leaking.  A urinalysis was sent, and he was notified to present to the ED for IV antibiotics.  He denies any " fever, (99 degrees prior to admission) no  shaking chills, pelvic pressure.  He received 2 doses of Cefdinir, and was changed to Cefepime. We were consulted for antibiotic management.  He has been seen by urology at [facility], and is scheduled for epidural injections tomorrow at .     Past Medical History:   Diagnosis Date   • A-fib    • Arthritis    • Back disorder     patient states he cannot walk or stand due to back pain since february 2017   • Cancer    • CHF (congestive heart failure)    • Deep vein thrombosis    • Fracture     left wrist, left hand > 10 years ago    • Hypertension        Past Surgical History:   Procedure Laterality Date   • KNEE SURGERY     • SKIN BIOPSY     • SKIN CANCER EXCISION      left ear removed, multiple areas removed    • SKIN SURGERY      basal cell and melanoma   • VASECTOMY         Family History   Problem Relation Age of Onset   • Cancer Father    • Arthritis Sister        Social History     Social History   • Marital status:      Spouse name: N/A   • Number of children: N/A   • Years of education: N/A     Occupational History   • Not on file.     Social History Main Topics   • Smoking status: Never Smoker   • Smokeless tobacco: Never Used   • Alcohol use No   • Drug use: No   • Sexual activity: Defer     Other Topics Concern   • Not on file     Social History Narrative       Allergies   Allergen Reactions   • Penicillins Hives         Medication:    Current Facility-Administered Medications:   •  calcium carbonate (TUMS) chewable tablet 500 mg (200 mg elemental), 1 tablet, Oral, Daily, Shar Mcarthur MD, 1 tablet at 07/31/17 0924  •  cholecalciferol (VITAMIN D3) tablet 1,000 Units, 1,000 Units, Oral, BID, Shar Mcarthur MD, 1,000 Units at 07/31/17 0924  •  famotidine (PEPCID) tablet 20 mg, 20 mg, Oral, Daily, Shar Mcarthur MD, 20 mg at 07/31/17 0924  •  finasteride (PROSCAR) tablet 5 mg, 5 mg, Oral, Daily, Shar Mcarthur MD, 5 mg at 07/31/17 0924  •   gabapentin (NEURONTIN) capsule 400 mg, 400 mg, Oral, TID, Shar Mcarthur MD, 400 mg at 07/31/17 0924  •  metoprolol tartrate (LOPRESSOR) tablet 50 mg, 50 mg, Oral, BID, Shar Mcarthur MD, 50 mg at 07/31/17 0924  •  ondansetron (ZOFRAN) injection 4 mg, 4 mg, Intravenous, Q6H PRN, Shar Mcarthur MD  •  oxyCODONE-acetaminophen (PERCOCET)  MG per tablet 1 tablet, 1 tablet, Oral, Q6H PRN, Shar Mcarthur MD, 1 tablet at 07/31/17 0623  •  rivaroxaban (XARELTO) tablet 20 mg, 20 mg, Oral, Daily, Shar Mcarthur MD, 20 mg at 07/29/17 1007  •  saccharomyces boulardii (FLORASTOR) capsule 250 mg, 250 mg, Oral, BID, Gregorio Carmen MD, 250 mg at 07/31/17 0924  •  Insert peripheral IV, , , Once **AND** sodium chloride 0.9 % flush 10 mL, 10 mL, Intravenous, PRN, Wale Pineda PA-C  •  tamsulosin (FLOMAX) 24 hr capsule 0.4 mg, 0.4 mg, Oral, Daily, Shar Mcarthur MD, 0.4 mg at 07/31/17 0924  •  vitamin C (ASCORBIC ACID) tablet 500 mg, 500 mg, Oral, Daily, Gregorio Carmen MD, 500 mg at 07/31/17 0924    Antibiotics:  IV Anti-Infectives     None            Review of Systems:  Constitutional-- No Fever, chills or sweats.  Appetite good, and no malaise. No fatigue.  HEENT-- No new vision, hearing or throat complaints.  No epistaxis or oral sores.  Denies odynophagia or dysphagia. No headache, photophobia or neck stiffness.  CV-- No chest pain, palpitation or syncope  Resp-- No SOB/cough/Hemoptysis  GI- No nausea, vomiting, or diarrhea.  No hematochezia, melena, or hematemesis. Denies jaundice or chronic liver disease.  -- No dysuria, hematuria, or flank pain.  Chronic owusu   Lymph- no swollen lymph nodes in neck/axilla or groin.   Heme- No active bruising or bleeding; no Hx of DVT or PE.  MS-- no swelling or pain in the bones or joints of arms/legs.  No new back pain.  Neuro-- No acute focal weakness or numbness in the arms or legs.  No seizures.  Skin--No rashes or lesions  Chronic  venous stasis dermatitis bilaterally       Physical Exam:   Vital Signs  Temp (24hrs), Av.3 °F (36.8 °C), Min:97.8 °F (36.6 °C), Max:98.7 °F (37.1 °C)    Temp  Min: 97.8 °F (36.6 °C)  Max: 98.7 °F (37.1 °C)  BP  Min: 116/63  Max: 139/68  Pulse  Min: 56  Max: 74  Resp  Min: 18  Max: 20  SpO2  Min: 96 %  Max: 98 %    GENERAL: Awake and alert, in no acute distress.   HEENT: Normocephalic, atraumatic.  PERRL. EOMI. No conjunctival injection. No icterus. Oropharynx clear without evidence of thrush or exudate. No evidence of peridontal disease.    NECK: Supple without nuchal rigidity. No mass.  LYMPH: No cervical, axillary or inguinal lymphadenopathy.  HEART: RRR; No murmur, rubs, gallops.   LUNGS: Clear to auscultation bilaterally without wheezing, rales, rhonchi. Normal respiratory effort. Nonlabored. No dullness.  ABDOMEN: Soft, nontender, nondistended. Positive bowel sounds. No rebound or guarding. NO mass or HSM.  EXT:  No cyanosis, clubbing or edema. No cord.  : Genitalia generally unremarkable.   Oneal catheter.  MSK: FROM without joint effusions noted arms/legs.    SKIN: Warm and dry without cutaneous eruptions on Inspection/palpation.    NEURO: Oriented to PPT. No focal deficits on motor/sensory exam at arms/legs.  PSYCHIATRIC: Normal insight and judgement. Cooperative with PE    Laboratory Data      Results from last 7 days  Lab Units 17  0545 17  2144   WBC 10*3/mm3 5.58 6.36   HEMOGLOBIN g/dL 11.4* 13.1   HEMATOCRIT % 36.6* 41.8   PLATELETS 10*3/mm3 189 232       Results from last 7 days  Lab Units 17  0545   SODIUM mmol/L 137   POTASSIUM mmol/L 4.9   CHLORIDE mmol/L 102   CO2 mmol/L 35.0*   BUN mg/dL 25*   CREATININE mg/dL 0.70   GLUCOSE mg/dL 108*   CALCIUM mg/dL 8.7       Results from last 7 days  Lab Units 17  0545   ALK PHOS U/L 100   BILIRUBIN mg/dL 0.3   ALT (SGPT) U/L 19   AST (SGOT) U/L 19           Results from last 7 days  Lab Units 17  0545   CRP mg/dL 5.03*        Results from last 7 days  Lab Units 07/28/17  2144   LACTATE mmol/L 1.3             Estimated Creatinine Clearance: 95.4 mL/min (by C-G formula based on Cr of 0.7).      Microbiology:  Blood Culture   Date Value Ref Range Status   07/28/2017 No growth at 2 days  Preliminary   07/28/2017 No growth at 2 days  Preliminary                    Urine Culture   Date Value Ref Range Status   07/28/2017 >100,000 CFU/mL Non-Lactose  (A)  Preliminary       Urine culture:  MDR PSA- sensitivities to be put in computer within next 5 min- called        Radiology:  Imaging Results (last 72 hours)     Procedure Component Value Units Date/Time    XR Chest 1 View [[id number]] Collected:  07/28/17 2135     Updated:  07/28/17 2253    Addenda:        CLINICAL HISTORY addendum:  Shortness of breath, History of congestive heart failure, DVT,   hypertension    THIS DOCUMENT HAS BEEN ELECTRONICALLY SIGNED BY TESSIE KEARNEY MD  Signed:  07/28/17 2252 by Tessie Kearney MD    Narrative:       EXAM:    XR Chest, 1 View    CLINICAL HISTORY:    84 years old, male; Signs and symptoms; Other: UTI; Additional info:     TECHNIQUE:    Frontal view of the chest.    COMPARISON:    No relevant prior studies available.    FINDINGS:    Lungs:  Mild perihilar pulmonary opacities, consider mild interstitial   pulmonary edema.  No lobar consolidation    Pleural space:  Blunted left costophrenic angle.  No pneumothorax.    Heart:  Moderate cardiomediastinal enlargement, correlate for cardiomegaly   versus pericardial effusion.    Mediastinum:  Unremarkable.    Bones/joints:  Unremarkable.  Left axillary surgical clips.      Impression:       1.  Mild perihilar pulmonary opacities, consider mild interstitial pulmonary   edema.    2.  Small left pleural effusion.    THIS DOCUMENT HAS BEEN ELECTRONICALLY SIGNED BY TESSIE KEARNEY MD            Impression:   1.   MDR PSA /UTI, in chronic indwelling owusu catheter.   2.  Afib  3.  CHF  4,. Herniated  "discs    PLAN/RECOMMENDATIONS:   Thank you for asking us to see Calderon Scott, I recommend the followin. DC antibiotics  2.  Recommend discontinuation of owusu  3.  In and out catheterizations prn retention     Dr. Wolf has obtained the history, performed the physical exam and formulated the above treatment plan.   BAL Colin  2017  9:36 AM                   Electronically signed by BAL Colin at 2017 10:15 AM           Nutrition Notes (most recent note)      Alondra Garcia RD at 2017 11:48 AM  Version 1 of 1     Consult Orders:    1. Inpatient Consult to Nutrition [[id number]] ordered by Gregorio Carmen MD at 17 [id number]                Adult Nutrition  Assessment/PES    Patient Name:  Calderon Scott  YOB: 1932  MRN: [id number]  Admit Date:  2017    Assessment Date:  2017        Reason for Assessment       17 1140    Reason for Assessment    Reason For Assessment/Visit physician consult    Time Spent (min) 20    Cardiac HTN    Fluid Status Edema   LE    Skin --   skin ulceration- right ankle    Other diagnosis Profound weakness, debility, chronic severe malnutrition              Nutrition/Diet History       17 1141    Nutrition/Diet History    Typical Food/Fluid Intake Pt. advised at home he eats a wide variety of foods including fruits and vegetables (plenty from a family's vegetable garden)            Anthropometrics       17 1142    Anthropometrics    Height Method Stated    Height 188 cm (74\")    Weight Method Bed scale    Weight 122 kg (269 lb 12.8 oz)    Ideal Body Weight (IBW)    Ideal Body Weight (IBW), Male (kg) 87.66    % Ideal Body Weight 139.91    Usual Body Weight (UBW)    Usual Body Weight 186 kg (411 lb)   Patient advised RD this was an intentional weight loss the past three years.    % Usual Body Weight 65.64    Body Mass Index (BMI)    BMI (kg/m2) 34.71            Labs/Tests/Procedures/Meds       " 17 1144    Labs/Tests/Procedures/Meds    Labs/Tests Review Reviewed                Nutrition Prescription Ordered       17 1145    Nutrition Prescription PO    Current PO Diet Regular    Common Modifiers Low Sodium            Evaluation of Received Nutrient/Fluid Intake       17 1145    PO Evaluation    Number of Meals 5    % PO Intake 90              Problem/Interventions:        Problem 1       17 1146    Nutrition Diagnoses Problem 1    Problem 1 Nutrition Appropriate for Condition at this Time    Etiology (related to) Factors Affecting Nutrition    Appetite Good    Signs/Symptoms (evidenced by) PO Intake    Percent (%) intake recorded 90 %    Over number of meals 5                    Intervention Goal       17 1147    Intervention Goal    General Nutrition support treatment    PO Maintain intake            Nutrition Intervention       17 1147    Nutrition Intervention    RD/Tech Action Follow Tx progress    assisting pt. with menu selections.              Education/Evaluation       17 1148    Education    Education --   MNT discussed with pt.    Monitor/Evaluation    Monitor Per protocol            Electronically signed by:  Alondra Garcia RD  17 11:48 AM     Electronically signed by Alondra Garcia RD at 2017 11:49 AM           Physical Therapy Notes (most recent note)      Rajni Dawn PTA at 2017  1:43 PM  Version 1 of 1         Acute Care - Physical Therapy Treatment Note   [place]     Patient Name: Calderon Scott  : 1932  MRN: [id number]  Today's Date: 2017  Onset of Illness/Injury or Date of Surgery Date: 17  Date of Referral to PT: 17  Referring Physician: MD Kermit    Admit Date: 2017    Visit Dx:    ICD-10-CM ICD-9-CM   1. Urinary tract infection associated with catheterization of urinary tract, unspecified indwelling urinary catheter type, initial encounter T83.511A 996.64    N39.0  599.0   2. Pleural effusion, left J90 511.9   3. Congestive heart failure, unspecified congestive heart failure chronicity, unspecified congestive heart failure type I50.9 428.0   4. Hypoalbuminemia E88.09 273.8   5. Impaired functional mobility, balance, gait, and endurance Z74.09 V49.89     Patient Active Problem List   Diagnosis   • Essential hypertension   • Congestive heart failure   • Malignant melanoma of torso excluding breast   • Numbness and tingling of hand   • Deep vein thrombosis (DVT) of proximal vein of right lower extremity   • Pre-ulcerative corn or callous   • Chronic a-fib   • Urinary tract infection associated with catheterization of urinary tract   • Lower paraplegia               Adult Rehabilitation Note       08/01/17 1313          Rehab Assessment/Intervention    Discipline physical therapy assistant  -AS      Document Type therapy note (daily note)  -AS      Subjective Information agree to therapy;complains of;weakness;pain  -AS      Patient Effort, Rehab Treatment adequate  -AS      Precautions/Limitations fall precautions  -AS      Recorded by [AS] Rajni Dawn PTA      Pain Assessment    Pain Assessment 0-10  -AS      Pain Score 5  -AS      Post Pain Score 0  -AS      Pain Type Chronic pain  -AS      Pain Location Knee  -AS      Pain Orientation Right;Left   right>left  -AS      Pain Intervention(s) Repositioned  -AS      Response to Interventions tolerated  -AS      Recorded by [AS] Rajni Dawn PTA      Cognitive Assessment/Intervention    Current Cognitive/Communication Assessment functional  -AS      Orientation Status oriented x 4  -AS      Follows Commands/Answers Questions 100% of the time;able to follow single-step instructions  -AS      Personal Safety WNL/WFL  -AS      Personal Safety Interventions fall prevention program maintained  -AS      Recorded by [AS] Rajni Dawn PTA      Bed Mobility, Assessment/Treatment    Bed Mobility, Assistive Device draw  sheet  -AS      Bed Mobility, Roll Left, Epsom verbal cues required;maximum assist (25% patient effort)  -AS      Bed Mobility, Roll Right, Epsom verbal cues required;maximum assist (25% patient effort)  -AS      Bed Mobility, Comment rolled to position lift sling  -AS      Recorded by [AS] Rajni Dawn PTA      Transfer Assessment/Treatment    Transfers, Bed-Chair Epsom dependent (less than 25% patient effort);2 person assist required  -AS      Transfers, Bed-Chair-Bed, Assist Device mechanical lift  -AS      Transfer, Comment bed<>chair with mechanical lift  -AS      Recorded by [AS] Rajni Dawn PTA      Gait Assessment/Treatment    Gait, Epsom Level not appropriate to assess  -AS      Recorded by [AS] Rajni Dawn PTA      Therapy Exercises    Bilateral Lower Extremities PROM:;AAROM:;10 reps;supine;ankle pumps/circles;heel slides;hip abduction/adduction;SLR   limited knee flexion due to pain and edema  -AS      Recorded by [AS] Rajni Dawn PTA      Positioning and Restraints    Pre-Treatment Position in bed  -AS      Post Treatment Position chair  -AS      In Chair reclined;call light within reach;encouraged to call for assist;on mechanical lift sling;legs elevated  -AS      Recorded by [AS] Rajni Dawn PTA        User Key  (r) = Recorded By, (t) = Taken By, (c) = Cosigned By    Initials Name Effective Dates    AS Rajni Dawn PTA 06/22/15 -                 IP PT Goals       08/01/17 1341 07/31/17 1015 07/29/17 1621    Bed Mobility PT LTG    Bed Mobility PT LTG, Date Established   07/29/17  -KM    Bed Mobility PT LTG, Time to Achieve   2 wks  -KM    Bed Mobility PT LTG, Activity Type   roll left/roll right;supine to sit/sit to supine  -KM    Bed Mobility PT LTG, Epsom Level   moderate assist (50% patient effort);2 person assist required  -KM    Bed Mobility PT LTG, Date Goal Reviewed 08/01/17  -AS      Bed Mobility PT LTG, Outcome  goal ongoing  -AS      Static Sitting Balance PT LTG    Static Sitting Balance PT LTG, Time to Achieve   2 wks  -KM    Static Sitting Balance PT LTG, [place] Level   moderate assist (50% patient effort)  -KM    Static Sitting Balance PT LTG, Assist Device   UE Support  -KM    Static Sitting Balance PT LTG, Date Goal Reviewed 08/01/17  -AS      Static Sitting Balance PT LTG, Outcome goal ongoing  -AS      Wound Care PT LTG    Wound Care PT LTG 1, Date Established  07/31/17  -MC     Wound Care PT LTG 1, Time to Achieve  5 - 7 days  -MC     Wound Care PT LTG 1, Location  R ankle wound  -MC     Wound Care PT LTG 1, No S&S of Infection  yes  -MC     Wound Care PT LTG 1, Decrease Wound Size  25%  -MC     Wound Care PT LTG 1, No New Skin Break Down  yes  -MC     Wound Care PT LTG 1, Education  dressing changes;wound care  -MC     Wound Care PT LTG 1, Education Understanding  verbalize understanding  -MC     Wound Care 2 PT LTG    Wound Care PT LTG 2, Date Established  07/31/17  -MC     Wound Care PT LTG 2, Time to Achieve  5 - 7 days  -MC     Wound Care PT LTG 2, Location  BLE edema  -MC     Wound Care PT LTG 2, Decrease Limb Girth cm  2  -MC     Wound Care PT LTG 2, No New Skin Break Down  yes  -MC     Wound Care PT LTG 2, Education  edema management;pressure relief  -MC     Wound Care PT LTG 2, Education Understanding  verbalize understanding  -MC       User Key  (r) = Recorded By, (t) = Taken By, (c) = Cosigned By    Initials Name Provider Type    AMELIA Romero, PT Physical Therapist    AS Rajni Dawn, PTA Physical Therapy Assistant    VIKASH Luna, PT Physical Therapist          Physical Therapy Education     Title: PT OT SLP Therapies (Active)     Topic: Physical Therapy (Active)     Point: Mobility training (Active)    Learning Progress Summary    Learner Readiness Method Response Comment Documented by Status   Patient Acceptance E NR  AS 08/01/17 1341 Active    Acceptance E MINE HARTMAN  08/01/17 0100 Done    Acceptance E VU  CM 07/31/17 0229 Done    Acceptance E VU  KM 07/29/17 1620 Done               Point: Home exercise program (Active)    Learning Progress Summary    Learner Readiness Method Response Comment Documented by Status   Patient Acceptance E NR  AS 08/01/17 1341 Active    Acceptance E VU  CM 08/01/17 0100 Done    Acceptance E VU  CM 07/31/17 0229 Done    Acceptance E VU  KM 07/29/17 1620 Done               Point: Body mechanics (Active)    Learning Progress Summary    Learner Readiness Method Response Comment Documented by Status   Patient Acceptance E NR  AS 08/01/17 1341 Active    Acceptance E VU  CM 08/01/17 0100 Done    Acceptance E VU  CM 07/31/17 0229 Done    Acceptance E VU  KM 07/29/17 1620 Done               Point: Precautions (Active)    Learning Progress Summary    Learner Readiness Method Response Comment Documented by Status   Patient Acceptance E NR  AS 08/01/17 1341 Active    Acceptance E VU  CM 08/01/17 0100 Done    Acceptance E VU  CM 07/31/17 0229 Done    Acceptance E VU  KM 07/29/17 1620 Done                      User Key     Initials Effective Dates Name Provider Type Discipline     06/19/15 -  Mariela Romero, PT Physical Therapist PT    AS 06/22/15 -  Rajni Dawn PTA Physical Therapy Assistant PT     06/16/17 -  GHADA Krishnamurthy Registered Nurse Nurse                    PT Recommendation and Plan  Anticipated Discharge Disposition: home with home health  PT Frequency: 2-3 times/wk  Plan of Care Review  Plan Of Care Reviewed With: patient  Progress: no change  Outcome Summary/Follow up Plan: mechanical lift to chair for safety, bilateral knee flexion limited due to pain.          Outcome Measures       08/01/17 1313          How much help from another person do you currently need...    Turning from your back to your side while in flat bed without using bedrails? 2  -AS      Moving from lying on back to sitting on the side of a flat bed without  bedrails? 1  -AS      Moving to and from a bed to a chair (including a wheelchair)? 1  -AS      Standing up from a chair using your arms (e.g., wheelchair, bedside chair)? 1  -AS      Climbing 3-5 steps with a railing? 1  -AS      To walk in hospital room? 1  -AS      AM-PAC 6 Clicks Score 7  -AS      Functional Assessment    Outcome Measure Options AM-PAC 6 Clicks Basic Mobility (PT)  -AS        User Key  (r) = Recorded By, (t) = Taken By, (c) = Cosigned By    Initials Name Provider Type    AS Rajni Dawn PTA Physical Therapy Assistant           Time Calculation:         PT Charges       08/01/17 1343          Time Calculation    Start Time 1313  -AS      PT Received On 08/01/17  -AS      PT Goal Re-Cert Due Date 08/08/17  -AS      Time Calculation- PT    Total Timed Code Minutes- PT 23 minute(s)  -AS        User Key  (r) = Recorded By, (t) = Taken By, (c) = Cosigned By    Initials Name Provider Type    AS Rajni Dawn PTA Physical Therapy Assistant          Therapy Charges for Today     Code Description Service Date Service Provider Modifiers Qty    99368364327 HC PT THER PROC EA 15 MIN 8/1/2017 Rajni Dawn PTA GP 2    05156677364 HC PT THER SUPP EA 15 MIN 8/1/2017 Rajni Dawn PTA GP 2          PT G-Codes  Outcome Measure Options: AM-PAC 6 Clicks Basic Mobility (PT)  Score: 6  Functional Limitation: Changing and maintaining body position  Mobility: Walking and Moving Around Current Status (): 100 percent impaired, limited or restricted  Mobility: Walking and Moving Around Goal Status (): At least 80 percent but less than 100 percent impaired, limited or restricted    Rajni Dawn PTA  8/1/2017            Electronically signed by Rajni Dawn PTA at 8/1/2017  1:44 PM        Occupational Therapy Notes (most recent note)     No notes of this type exist for this encounter.        Speech Language Pathology Notes (most recent note)     No notes of this type exist  for this encounter.             Discharge Summary      Casie M Mayne, PA-C at 8/2/2017 12:19 PM              [facility] Medicine Services  DISCHARGE SUMMARY       Date of Admission: 7/28/2017  Date of Discharge:  8/2/2017  Primary Care Physician: BAL Farmer  Consulting Physician(s)     Provider Relationship    Jacky Wolf MD Consulting Physician          Discharge Diagnoses:  Active Hospital Problems (** Indicates Principal Problem)    Diagnosis Date Noted   • Lower paraplegia [G82.20] 08/01/2017     From spinal stenosis     • Urinary tract infection associated with catheterization of urinary tract [T83.511A, N39.0] 07/29/2017   • Chronic a-fib [I48.2] 05/18/2017      Resolved Hospital Problems    Diagnosis Date Noted Date Resolved   No resolved problems to display.       Presenting Problem/History of Present Illness  Urinary tract infection associated with catheterization of urinary tract, unspecified indwelling urinary catheter type, initial encounter [T83.511A, N39.0]     History of Present Illness on Day of Discharge:   Patient resting in bed at time of visit, reports feeling well, no complaints.  Patient denies fever, chills, myalgias, shortness of air, chest pain, abdominal pain, nausea vomiting or diarrhea.  Patient is eager to go home, lives with his granddaughter.  Case management has helped arrange transport home.  Patient understands plan to follow up with [facility] urologist for further evaluation discussion on management of urinary retention, consideration of appropriateness of discontinuation of Oneal catheter.      Hospital Course  Patient is a 84 y.o. 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 The patient has a chronic indwelling Oneal catheter that is replaced every 4 weeks.  He states that he's had multiple antibiotic courses for presumed urinary tract infection often drawn from his Oneal bag without any urinary symptoms or systemic signs of infection     On Wednesday, July 26, 2017 the patient had a routine home visit for a Oneal catheter exchange.  Shortly thereafter the patient was rolling in bed and accidentally pulled on the Oneal catheter and had leakage around the catheter.  For some reason a urine sample was sent then and grew pseudomonas aeruginosa resistant to oral antibiotics and he received a phone call from his primary care physician to present to the emergency room 7/28/17. At time or presentation patient denied any fever, pelvic pain, flank pain, nausea, vomiting or any signs of infection.   Patient was admitted to the hospitalist service for further evaluation and management.  ID was consulted for further evaluation and management.  Dr. Wolf evaluated patient, patient reported recollection of fever up to 101 F prior to admission, given this and culture positive for Pseudomonas ID recommended short course of cefepime 1 g every 8 hours.  This was completed on Wednesday August/2/2017.  ID recommends patient follow-up with [facility] urology with goal of discontinuation of Oneal catheter due to patient's risk of recurrent resistant organism UTI, urinary retention/bladder obstruction to be managed otherwise per [facility] urology as deemed appropriate.  Patient has remained afebrile, blood pressure controlled, blood cx negative to date, and asymptomatic.Follow-up appointment has been arranged with [facility] urology clinic August 16, 2017 at 11:40 AM.     Home medications were  continued for chronic atrial fibrillation, lisinopril and Lasix were discontinued given no reported history of heart failure, appeared to have volume depletion at time of presentation.  To follow-up with patient's PCP at time of discharge for further monitoring and management.    PT OT recommending home with home health at time of discharge, case management has discussed disposition with patient's granddaughter he reports patient being provided 24-hour care, no further DME needs identified, will resume, with home health at time of discharge.  Patient is now medically appropriate for discharge home, will need to follow-up with PCP within 1 week of discharge for posthospitalization evaluation.  Patient to follow-up with urology as above.        Procedures Performed     none    Consults:   Consults     No orders found from 6/29/2017 to 7/29/2017.          Pertinent Test Results:  Component      Latest Ref Rng & Units 7/28/2017 7/28/2017           9:44 PM  9:44 PM   WBC      3.50 - 10.80 10*3/mm3 6.36    RBC      4.20 - 5.76 10*6/mm3 5.24    Hemoglobin      13.1 - 17.5 g/dL 13.1    Hematocrit      38.9 - 50.9 % 41.8    MCV      80.0 - 99.0 fL 79.8 (L)    MCH      27.0 - 31.0 pg 25.0 (L)    MCHC      32.0 - 36.0 g/dL 31.3 (L)    RDW      11.3 - 14.5 % 19.7 (H)    RDW-SD      37.0 - 54.0 fl 58.2 (H)    MPV      6.0 - 12.0 fL 9.3    Platelets      150 - 450 10*3/mm3 232    Neutrophil %      41.0 - 71.0 % 62.8    Lymphocyte %      24.0 - 44.0 % 22.3 (L)    Monocyte %      0.0 - 12.0 % 12.6 (H)    Eosinophil %      0.0 - 3.0 % 1.3    Basophil %      0.0 - 1.0 % 0.8    Immature Grans %      0.0 - 0.6 % 0.2    Neutrophils, Absolute      1.50 - 8.30 10*3/mm3 4.00    Lymphocytes, Absolute      0.60 - 4.80 10*3/mm3 1.42    Monocytes, Absolute      0.00 - 1.00 10*3/mm3 0.80    Eosinophils, Absolute      0.00 - 0.30 10*3/mm3 0.08    Basophils, Absolute      0.00 - 0.20 10*3/mm3 0.05    Immature Grans, Absolute      0.00 - 0.03  10*3/mm3 0.01    Glucose      70 - 100 mg/dL 129 (H)    BUN      9 - 23 mg/dL 30 (H)    Creatinine      0.60 - 1.30 mg/dL 0.60    Sodium      132 - 146 mmol/L 136    Potassium      3.5 - 5.5 mmol/L 4.5    Chloride      99 - 109 mmol/L 103    CO2      20.0 - 31.0 mmol/L 29.0    Calcium      8.7 - 10.4 mg/dL 7.9 (L)    Total Protein      5.7 - 8.2 g/dL 5.9    Albumin      3.20 - 4.80 g/dL 2.80 (L)    ALT (SGPT)      7 - 40 U/L 35    AST (SGOT)      0 - 33 U/L 46 (H)    Alkaline Phosphatase      25 - 100 U/L 137 (H)    Total Bilirubin      0.3 - 1.2 mg/dL 0.3    eGFR Non African Amer      >60 mL/min/1.73 128    Globulin      gm/dL 3.1    A/G Ratio      1.5 - 2.5 g/dL 0.9 (L)    BUN/Creatinine Ratio      7.0 - 25.0 50.0 (H)    Anion Gap      3.0 - 11.0 mmol/L 4.0    Blood Culture       No growth at 4 days No growth at 4 days   Lactate, Venous      0.5 - 2.0 mmol/L 1.3    Procalcitonin      ng/mL 0.06      Component      Latest Ref Rng & Units 7/29/2017 7/30/2017 8/1/2017               WBC      3.50 - 10.80 10*3/mm3  5.58    RBC      4.20 - 5.76 10*6/mm3  4.65    Hemoglobin      13.1 - 17.5 g/dL  11.4 (L)    Hematocrit      38.9 - 50.9 %  36.6 (L)    MCV      80.0 - 99.0 fL  78.7 (L)    MCH      27.0 - 31.0 pg  24.5 (L)    MCHC      32.0 - 36.0 g/dL  31.1 (L)    RDW      11.3 - 14.5 %  19.9 (H)    RDW-SD      37.0 - 54.0 fl  57.3 (H)    MPV      6.0 - 12.0 fL  8.9    Platelets      150 - 450 10*3/mm3  189    Neutrophil %      41.0 - 71.0 %  54.7    Lymphocyte %      24.0 - 44.0 %  23.3 (L)    Monocyte %      0.0 - 12.0 %  15.9 (H)    Eosinophil %      0.0 - 3.0 %  5.2 (H)    Basophil %      0.0 - 1.0 %  0.7    Immature Grans %      0.0 - 0.6 %  0.2    Neutrophils, Absolute      1.50 - 8.30 10*3/mm3  3.05    Lymphocytes, Absolute      0.60 - 4.80 10*3/mm3  1.30    Monocytes, Absolute      0.00 - 1.00 10*3/mm3  0.89    Eosinophils, Absolute      0.00 - 0.30 10*3/mm3  0.29    Basophils, Absolute      0.00 - 0.20 10*3/mm3   0.04    Immature Grans, Absolute      0.00 - 0.03 10*3/mm3  0.01    Glucose      70 - 100 mg/dL  108 (H) 94   BUN      9 - 23 mg/dL  25 (H) 19   Creatinine      0.60 - 1.30 mg/dL  0.70 0.60   Sodium      132 - 146 mmol/L  137 138   Potassium      3.5 - 5.5 mmol/L  4.9 4.5   Chloride      99 - 109 mmol/L  102 106   CO2      20.0 - 31.0 mmol/L  35.0 (H) 28.0   Calcium      8.7 - 10.4 mg/dL  8.7 8.3 (L)   Total Protein      5.7 - 8.2 g/dL  5.5 (L)    Albumin      3.20 - 4.80 g/dL  2.60 (L) 2.30 (L)   ALT (SGPT)      7 - 40 U/L  19    AST (SGOT)      0 - 33 U/L  19    Alkaline Phosphatase      25 - 100 U/L  100    Total Bilirubin      0.3 - 1.2 mg/dL  0.3    eGFR Non African Amer      >60 mL/min/1.73  107 128   Globulin      gm/dL  2.9    A/G Ratio      1.5 - 2.5 g/dL  0.9 (L)    BUN/Creatinine Ratio      7.0 - 25.0  35.7 (H) 31.7 (H)   Anion Gap      3.0 - 11.0 mmol/L  0.0 (L) 4.0   Phosphorus      2.4 - 5.1 mg/dL   3.7   Color, UA      Yellow, Straw Yellow     Appearance, UA      Clear Cloudy (A)     pH, UA      5.0 - 8.0 <=5.0     Specific Gravity, UA      1.001 - 1.030 1.023     Glucose, UA      Negative Negative     Ketones, UA      Negative Negative     Bilirubin, UA      Negative Negative     Blood, UA      Negative Large (3+) (A)     Protein, UA      Negative Trace (A)     Leuk Esterase, UA      Negative Large (3+) (A)     Nitrite, UA      Negative Negative     Urobilinogen, UA      0.2 - 1.0 E.U./dL 0.2 E.U./dL     RBC, UA      None Seen, 0-2 /HPF Too Numerous to Count (A)     WBC, UA      None Seen /HPF Too Numerous to Count (A)     Bacteria, UA      None Seen, Trace /HPF None Seen     Squamous Epithelial Cells, UA      None Seen, 0-2 /HPF None Seen     Hyaline Casts, UA      0 - 6 /LPF None Seen     Methodology:       Manual Light Microscopy     C-Reactive Protein      0.00 - 1.00 mg/dL  5.03 (H)      Condition on Discharge:  stable    Physical Exam on Discharge:/63  Pulse 59  Temp 97.7 °F (36.5 °C)  "(Oral)   Resp 18  Ht 74\" (188 cm)  Wt 269 lb 12.8 oz (122 kg)  SpO2 97%  BMI 34.64 kg/m2  Physical Exam    Gen. appearance: Pleasant  male resting in bed, appears in NAD He is awake and alert.   Chest: Clear to auscultation bilaterally.  No wheezing, rales or rhonchi  Cardiovascular: RRR, No murmurs rubs or gallop   Abdomen: Soft. Non tender to palpation.  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 Bilateral upper extremity weakness right greater than left.  Bilateral lower extremity motor deficits 4/5 left greater than right.  Psychiatric:  cheerful, cooperative  Skin: warm and dry  Discharge Disposition      Discharge Medications   Calderon Scott GALO   Home Medication Instructions DAR:[id number]    Printed on:08/02/17 1241   Medication Information                      calcium carbonate (TUMS) 500 MG chewable tablet  Chew 1 tablet Daily.             cholecalciferol (VITAMIN D3) 1000 UNITS tablet  Take 1 tablet by mouth 2 (Two) Times a Day.             Digestive Enzymes (ACIDOLL) capsule  Take  by mouth.             famotidine (PEPCID) 20 MG tablet  Take 1 tablet by mouth Daily.             finasteride (PROSCAR) 5 MG tablet  Take 1 tablet by mouth Daily.             gabapentin (NEURONTIN) 400 MG capsule  Take 1 capsule by mouth 3 (Three) Times a Day.             Glucosamine-Chondroit-Vit C-Mn (GLUCOSAMINE CHONDR 1500 COMPLX PO)  Take  by mouth.             methenamine (HIPREX) 1 G tablet  Take 1 tablet by mouth 2 (Two) Times a Day.             metoprolol tartrate (LOPRESSOR) 50 MG tablet  Take 1 tablet by mouth 2 (Two) Times a Day.             Multiple Vitamins-Minerals (MULTIVITAMIN ADULT PO)  Take  by mouth.             nystatin (MYCOSTATIN) 250459 UNIT/GM ointment  Apply  topically Every 12 (Twelve) Hours.             oxyCODONE-acetaminophen (PERCOCET)  MG per " tablet  1 by mouth every 4-6 hours as needed for pain             simvastatin (ZOCOR) 5 MG tablet  Take 1 tablet by mouth Every Night.             tamsulosin (FLOMAX) 0.4 MG capsule 24 hr capsule  Take 1 capsule by mouth Daily.             vitamin C (VITAMIN C) 500 MG tablet  Take 1 tablet by mouth Daily.             XARELTO 20 MG tablet  Take 1 tablet by mouth Daily.                 Discharge Diet:   Diet Instructions     Advance Diet As Tolerated                     Discharge Care Plan / Instructions:    Activity at Discharge:   Activity Instructions     Activity as Tolerated                     Follow-up Appointments  Future Appointments  Date Time Provider Department Center   8/8/2017 10:00 AM [facility] None         Test Results Pending at Discharge   Order Current Status    Blood Culture Preliminary result    Blood Culture Preliminary result           Casie M Mayne, PA-C 08/02/17 12:41 PM    Time: Discharge 40 min    Please note that portions of this note may have been completed with a voice recognition program. Efforts were made to edit the dictations, but occasionally words are mistranscribed.       Electronically signed by Casie M Mayne, PA-C at 8/2/2017 12:42 PM

## 2017-08-02 NOTE — PROGRESS NOTES
"[facility] Progress Note    Admission Date: 2017    Calderon Scott  1932  [id number]    Date: 2017    Meds:    IV Anti-Infectives     None          CC: UTI      SUBJECTIVE:17: Patient is a 84 y.o. male, with a chronic indwelling owusu catheter since 2017, had the owusu changed by home health last week.  Several days later the owusu got \"caught\" on his leg, was pulled and began leaking.  A urinalysis was sent, and he was notified to present to the ED for IV antibiotics.  He denies any fever, (99 degrees prior to admission) no  shaking chills, pelvic pressure.  He received 2 doses of Cefdinir, and was changed to Cefepime. We were consulted for antibiotic management.  He has been seen by urology at [facility], and is scheduled for epidural injections tomorrow at .   17:  Up in chair.  No complaints of nausea, diarrhea, fever \"feel fine\"  17:  Hoping to get to go home.  No fever,chillsl.     ROS:  No f/c/s. No n/v/d. No rash. No new ADR to Abx.     PE:   Vital Signs  Temp (24hrs), Av °F (36.7 °C), Min:97.7 °F (36.5 °C), Max:98.2 °F (36.8 °C)    Temp  Min: 97.7 °F (36.5 °C)  Max: 98.2 °F (36.8 °C)  BP  Min: 144/79  Max: 148/63  Pulse  Min: 59  Max: 65  Resp  Min: 18  Max: 18  SpO2  Min: 97 %  Max: 97 %    GENERAL: Awake and alert, in no acute distress.   HEENT:  No conjunctival injection. No icterus. Oropharynx clear without evidence of thrush or exudate.  NECK: Supple, no JVD     HEART: RRR; No murmur, rubs, gallops.   LUNGS: Clear to auscultation bilaterally without wheezing, rales, rhonchi. Normal respiratory effort. Nonlabored. No dullness.  ABDOMEN: Soft, nontender, nondistended. Positive bowel sounds. No rebound or guarding. NO mass or HSM.  EXT:  No cyanosis, clubbing or edema. No cord.  : Genitalia generally unremarkable. + owusu   SKIN: Warm and dry without cutaneous eruptions on Inspection/palpation.    NEURO: Alert and oriented x 3, Motor 5/5 bilaterally    Laboratory " Data      Results from last 7 days  Lab Units 07/30/17  0545 07/28/17  2144   WBC 10*3/mm3 5.58 6.36   HEMOGLOBIN g/dL 11.4* 13.1   HEMATOCRIT % 36.6* 41.8   PLATELETS 10*3/mm3 189 232       Results from last 7 days  Lab Units 08/01/17  0513   SODIUM mmol/L 138   POTASSIUM mmol/L 4.5   CHLORIDE mmol/L 106   CO2 mmol/L 28.0   BUN mg/dL 19   CREATININE mg/dL 0.60   GLUCOSE mg/dL 94   CALCIUM mg/dL 8.3*       Results from last 7 days  Lab Units 07/30/17  0545   ALK PHOS U/L 100   BILIRUBIN mg/dL 0.3   ALT (SGPT) U/L 19   AST (SGOT) U/L 19           Results from last 7 days  Lab Units 07/30/17  0545   CRP mg/dL 5.03*       Results from last 7 days  Lab Units 07/28/17  2144   LACTATE mmol/L 1.3             Estimated Creatinine Clearance: 95.4 mL/min (by C-G formula based on Cr of 0.6).    Microbiology:  Blood Culture   Date Value Ref Range Status   07/28/2017 No growth at 3 days  Preliminary   07/28/2017 No growth at 3 days  Preliminary                    Urine Culture   Date Value Ref Range Status   07/28/2017 >100,000 CFU/mL Pseudomonas aeruginosa (A)  Final     Comment:     Multi drug resistant Pseudomonas, patient may be an isolation risk.          Radiology:  Imaging Results (last 72 hours)     ** No results found for the last 72 hours. **              IMPRESSION:  1.   Pseudomonas UTI-with an indwelling Oneal catheter in place.  It has been somewhat difficult to determine if this was actually asymptomatic UTI or asymptomatic bacteriuria/colonization.  He does appear to have had a fever shortly prior to admission that may have been secondary to UTI.  I think that we should treat him relatively briefly.  There are no oral options for therapy.  I will plan to leave him on cefepime until Wednesday a.m.  It is unclear if he just has the Oneal catheter and due to immobility or if he actually had urinary obstruction or urinary retention.  I have suggested that he see [facility] urology in follow-up in the near future.  I think  that an attempt should be made to remove his catheter in order to lower his risk for recurrent resistant organism UTI.   2.  Afib  3.  CHF  4,. Herniated discs     PLAN/RECOMMENDATIONS:   1.  Dc Cefepime   2.  Follow-up with  urology with  the goal of removing his Oneal catheter  3.  Discharge today        Dr. Wolf has obtained the history, performed the physical exam and formulated the above treatment plan.     I discussed his disposition with case management today.    Jacky Wolf MD  8/2/2017  11:34 AM

## 2017-08-03 ENCOUNTER — TELEPHONE (OUTPATIENT)
Dept: FAMILY MEDICINE CLINIC | Facility: CLINIC | Age: 82
End: 2017-08-03

## 2017-08-03 NOTE — TELEPHONE ENCOUNTER
Has Mr Scott been to see the Cardiologist yet? He was supposed to have an appt to have his CHF HTN and Afib evaluated by cardiology who will be the one to manage his Lasix and BP medications. [facility]

## 2017-08-03 NOTE — TELEPHONE ENCOUNTER
HANNA ZHENG [phone] [facility] CALLING FOR VERBAL ORDERS OT TWICE WK FOR 2 WKS THEN ONCE WK 2 WKS. OK'D THIS

## 2017-08-03 NOTE — TELEPHONE ENCOUNTER
Tawana from [facility] is calling. Pt's BP is 180/100. They are calling to see if they can resume the Lisinopril 10mg once daily and possibly the Lasix he had been on. These were both stopped in the hospital.  Currently just on the Metoprolol 50mg BID.  Pt has a fu with you on 8-8-17.  Please call Ai or Tanner back with your response at [phone].

## 2017-08-03 NOTE — TELEPHONE ENCOUNTER
I had to call pt's granddaughter to see if pt had seen a Cardio yet. He has seen Dr. Le. Sandra also questioned any swelling in his ankles and the granddaughter said, she didn't think so because they have kept them wrapped. I informed her the answer regarding restarting these meds needed to come from Cardio. The nurse was still at the house when I called, she said, she would tell her this response.

## 2017-08-04 ENCOUNTER — TRANSITIONAL CARE MANAGEMENT TELEPHONE ENCOUNTER (OUTPATIENT)
Dept: FAMILY MEDICINE CLINIC | Facility: CLINIC | Age: 82
End: 2017-08-04

## 2017-08-04 ENCOUNTER — TELEPHONE (OUTPATIENT)
Dept: FAMILY MEDICINE CLINIC | Facility: CLINIC | Age: 82
End: 2017-08-04

## 2017-08-04 NOTE — TELEPHONE ENCOUNTER
[facility] CALLING  FOR A VERBAL ON CONTINUE P T FOR MOBILITY.   SUDARSHAN [phone]  OK'D THIS AS WE OK DID YESTERDAY ALSO

## 2017-08-08 ENCOUNTER — OFFICE VISIT (OUTPATIENT)
Dept: FAMILY MEDICINE CLINIC | Facility: CLINIC | Age: 82
End: 2017-08-08

## 2017-08-08 VITALS
RESPIRATION RATE: 20 BRPM | OXYGEN SATURATION: 100 % | HEART RATE: 54 BPM | SYSTOLIC BLOOD PRESSURE: 140 MMHG | DIASTOLIC BLOOD PRESSURE: 78 MMHG

## 2017-08-08 DIAGNOSIS — M48.00 SPINAL STENOSIS, UNSPECIFIED SPINAL REGION: ICD-10-CM

## 2017-08-08 DIAGNOSIS — I10 ESSENTIAL HYPERTENSION: ICD-10-CM

## 2017-08-08 DIAGNOSIS — N39.0 URINARY TRACT INFECTION ASSOCIATED WITH INDWELLING URETHRAL CATHETER, SUBSEQUENT ENCOUNTER: ICD-10-CM

## 2017-08-08 DIAGNOSIS — C44.41 BASAL CELL CARCINOMA OF HEAD: Primary | ICD-10-CM

## 2017-08-08 DIAGNOSIS — L98.9 SKIN LESION OF SCALP: ICD-10-CM

## 2017-08-08 DIAGNOSIS — B35.1 TOENAIL FUNGUS: ICD-10-CM

## 2017-08-08 DIAGNOSIS — T83.511D URINARY TRACT INFECTION ASSOCIATED WITH INDWELLING URETHRAL CATHETER, SUBSEQUENT ENCOUNTER: ICD-10-CM

## 2017-08-08 DIAGNOSIS — R73.9 HYPERGLYCEMIA: ICD-10-CM

## 2017-08-08 DIAGNOSIS — E78.2 MIXED HYPERLIPIDEMIA: ICD-10-CM

## 2017-08-08 LAB
BASOPHILS # BLD AUTO: 0.05 10*3/MM3 (ref 0–0.2)
BASOPHILS NFR BLD AUTO: 1.1 % (ref 0–1)
BUN SERPL-MCNC: 27 MG/DL (ref 9–23)
BUN/CREAT SERPL: 45 (ref 7–25)
CALCIUM SERPL-MCNC: 8.4 MG/DL (ref 8.7–10.4)
CHLORIDE SERPL-SCNC: 103 MMOL/L (ref 99–109)
CHOLEST SERPL-MCNC: 98 MG/DL (ref 0–200)
CO2 SERPL-SCNC: 31 MMOL/L (ref 20–31)
CREAT SERPL-MCNC: 0.6 MG/DL (ref 0.6–1.3)
EOSINOPHIL # BLD AUTO: 0.16 10*3/MM3 (ref 0–0.3)
EOSINOPHIL NFR BLD AUTO: 3.4 % (ref 0–3)
ERYTHROCYTE [DISTWIDTH] IN BLOOD BY AUTOMATED COUNT: 20.1 % (ref 11.3–14.5)
GLUCOSE SERPL-MCNC: 83 MG/DL (ref 70–100)
HBA1C MFR BLD: 5.5 % (ref 4.8–5.6)
HCT VFR BLD AUTO: 38.7 % (ref 38.9–50.9)
HDLC SERPL-MCNC: 32 MG/DL (ref 40–60)
HGB BLD-MCNC: 12 G/DL (ref 13.1–17.5)
IMM GRANULOCYTES # BLD: 0.02 10*3/MM3 (ref 0–0.03)
IMM GRANULOCYTES NFR BLD: 0.4 % (ref 0–0.6)
LDLC SERPL CALC-MCNC: 58 MG/DL (ref 0–100)
LYMPHOCYTES # BLD AUTO: 1.21 10*3/MM3 (ref 0.6–4.8)
LYMPHOCYTES NFR BLD AUTO: 25.6 % (ref 24–44)
MCH RBC QN AUTO: 24.2 PG (ref 27–31)
MCHC RBC AUTO-ENTMCNC: 31 G/DL (ref 32–36)
MCV RBC AUTO: 78 FL (ref 80–99)
MONOCYTES # BLD AUTO: 0.64 10*3/MM3 (ref 0–1)
MONOCYTES NFR BLD AUTO: 13.5 % (ref 0–12)
NEUTROPHILS # BLD AUTO: 2.65 10*3/MM3 (ref 1.5–8.3)
NEUTROPHILS NFR BLD AUTO: 56 % (ref 41–71)
PLATELET # BLD AUTO: 179 10*3/MM3 (ref 150–450)
POTASSIUM SERPL-SCNC: 4.5 MMOL/L (ref 3.5–5.5)
RBC # BLD AUTO: 4.96 10*6/MM3 (ref 4.2–5.76)
SODIUM SERPL-SCNC: 137 MMOL/L (ref 132–146)
TRIGL SERPL-MCNC: 40 MG/DL (ref 0–150)
VLDLC SERPL CALC-MCNC: 8 MG/DL
WBC # BLD AUTO: 4.73 10*3/MM3 (ref 3.5–10.8)

## 2017-08-08 PROCEDURE — 99214 OFFICE O/P EST MOD 30 MIN: CPT | Performed by: NURSE PRACTITIONER

## 2017-08-08 RX ORDER — OXYCODONE AND ACETAMINOPHEN 10; 325 MG/1; MG/1
TABLET ORAL
Qty: 18 TABLET | Refills: 0 | Status: SHIPPED | OUTPATIENT
Start: 2017-08-08 | End: 2017-08-25 | Stop reason: SDUPTHER

## 2017-08-08 NOTE — PROGRESS NOTES
Transitional Care Follow Up Visit  Subjective     Calderon Scott is a 84 y.o. male who presents for a transitional care management visit.    Within 48 business hours after discharge our office contacted him via telephone to coordinate his care and needs.      I reviewed and discussed the details of that call along with the discharge summary, hospital problems, inpatient lab results, inpatient diagnostic studies, and consultation reports with Calderon.    No flowsheet data found.    History of Present Illness   Course During Hospital Stay:  [facility] 7/28/17-8/2/17 was treated with IV abx and fluids. Wound care. PT for immobility issues.  CHF with pleural effusion. Was taken off diuretics and one of his BP medications       The following portions of the patient's history were reviewed and updated as appropriate: allergies, current medications, past family history, past medical history, past social history, past surgical history and problem list.    Review of Systems   Constitutional: Negative for chills, fatigue and fever.   HENT: Negative.    Eyes: Negative.    Respiratory: Negative.  Negative for cough and shortness of breath.    Cardiovascular: Negative.  Negative for chest pain and palpitations.   Gastrointestinal: Positive for diarrhea. Negative for abdominal pain.   Endocrine: Negative.    Genitourinary: Positive for difficulty urinating and scrotal swelling.   Musculoskeletal: Positive for back pain, gait problem, joint swelling and myalgias.   Skin: Positive for color change and wound.   Allergic/Immunologic: Negative.    Neurological: Positive for weakness. Negative for dizziness, syncope, light-headedness and headaches.   Hematological: Negative.    Psychiatric/Behavioral: Negative.        Objective   Physical Exam   Constitutional: He is oriented to person, place, and time. He appears well-developed and well-nourished.   HENT:   Head: Normocephalic.   Right Ear: External ear normal.   Left Ear: External ear  normal.   Nose: Nose normal.   Neck: Neck supple. No JVD present.   Cardiovascular: Normal rate, regular rhythm and normal heart sounds.    Pulmonary/Chest: Effort normal and breath sounds normal.   Abdominal: Soft. Bowel sounds are normal. He exhibits no distension.   Genitourinary: Penis normal.   Genitourinary Comments: Indwelling catheter secure, mild scrotal swelling   Musculoskeletal: Edema: bilateral lower extremities wrapped with ACE bandages.   Neurological: He is alert and oriented to person, place, and time.   Skin: Skin is warm and dry. Rash noted.   Dime-sized dark thickened skin lesion on front of scalp; flaky patches on skin of scalp   Psychiatric: He has a normal mood and affect. His behavior is normal. Judgment and thought content normal.   Nursing note and vitals reviewed.      Assessment/Plan   Calderon was seen today for hospital follow up.    Diagnoses and all orders for this visit:    Basal cell carcinoma of head  -     Cancel: Ambulatory Referral to Dermatology  -     Ambulatory Referral to Dermatology    Skin lesion of scalp  -     Cancel: Ambulatory Referral to Dermatology  -     Ambulatory Referral to Dermatology    Toenail fungus  -     Ambulatory Referral to Podiatry    Essential hypertension  -     CBC w AUTO Differential  -     Basic metabolic panel    Hyperglycemia  -     Hemoglobin A1c    Mixed hyperlipidemia  -     Lipid Panel    Osteoarthritis of both knees, unspecified osteoarthritis type  -     oxyCODONE-acetaminophen (PERCOCET)  MG per tablet; 1 by mouth every 4-6 hours as needed for pain    Spinal stenosis, unspecified spinal region  -     oxyCODONE-acetaminophen (PERCOCET)  MG per tablet; 1 by mouth every 4-6 hours as needed for pain           Will check labs since pt is fasting and has not had his cholesterol checked for sometime.   Will refill Percocet but have informed pt and his grand daughter that he will need to have  Pain management   Encouraged pt to keep  Urology appt at .  Keep Pain management appt  Keep Orthopedic appt  Keep Cardiology appt  Will refer pt to dermatology for evaluation of skin lesion  Will refer pt to podiatry for toenail trimming

## 2017-08-09 LAB
25(OH)D3+25(OH)D2 SERPL-MCNC: 39.7 NG/ML
Lab: NORMAL
MAGNESIUM SERPL-MCNC: 2 MG/DL (ref 1.3–2.7)
PHOSPHATE SERPL-MCNC: 3.6 MG/DL (ref 2.4–5.1)
SPECIMEN STATUS: NORMAL
WRITTEN AUTHORIZATION: NORMAL

## 2017-08-19 ENCOUNTER — HOSPITAL ENCOUNTER (INPATIENT)
Facility: HOSPITAL | Age: 82
LOS: 1 days | Discharge: HOME HEALTH | End: 2017-08-21
Attending: EMERGENCY MEDICINE | Admitting: INTERNAL MEDICINE

## 2017-08-19 DIAGNOSIS — Z74.09 IMPAIRED MOBILITY AND ADLS: ICD-10-CM

## 2017-08-19 DIAGNOSIS — T83.511A URINARY TRACT INFECTION ASSOCIATED WITH INDWELLING URETHRAL CATHETER, INITIAL ENCOUNTER (HCC): Primary | ICD-10-CM

## 2017-08-19 DIAGNOSIS — Z78.9 IMPAIRED MOBILITY AND ADLS: ICD-10-CM

## 2017-08-19 DIAGNOSIS — N39.0 URINARY TRACT INFECTION ASSOCIATED WITH INDWELLING URETHRAL CATHETER, INITIAL ENCOUNTER (HCC): Primary | ICD-10-CM

## 2017-08-19 DIAGNOSIS — T83.9XXA PROBLEM WITH FOLEY CATHETER, INITIAL ENCOUNTER (HCC): ICD-10-CM

## 2017-08-19 LAB
ALBUMIN SERPL-MCNC: 2.9 G/DL (ref 3.2–4.8)
ALBUMIN/GLOB SERPL: 1 G/DL (ref 1.5–2.5)
ALP SERPL-CCNC: 106 U/L (ref 25–100)
ALT SERPL W P-5'-P-CCNC: 18 U/L (ref 7–40)
ANION GAP SERPL CALCULATED.3IONS-SCNC: 0 MMOL/L (ref 3–11)
AST SERPL-CCNC: 23 U/L (ref 0–33)
BACTERIA UR QL AUTO: ABNORMAL /HPF
BASOPHILS # BLD AUTO: 0.02 10*3/MM3 (ref 0–0.2)
BASOPHILS NFR BLD AUTO: 0.3 % (ref 0–1)
BILIRUB SERPL-MCNC: 0.4 MG/DL (ref 0.3–1.2)
BILIRUB UR QL STRIP: ABNORMAL
BUN BLD-MCNC: 25 MG/DL (ref 9–23)
BUN/CREAT SERPL: 35.7 (ref 7–25)
CALCIUM SPEC-SCNC: 8.4 MG/DL (ref 8.7–10.4)
CHLORIDE SERPL-SCNC: 106 MMOL/L (ref 99–109)
CLARITY UR: ABNORMAL
CO2 SERPL-SCNC: 32 MMOL/L (ref 20–31)
COLOR UR: ABNORMAL
CREAT BLD-MCNC: 0.7 MG/DL (ref 0.6–1.3)
DEPRECATED RDW RBC AUTO: 62.3 FL (ref 37–54)
EOSINOPHIL # BLD AUTO: 0.16 10*3/MM3 (ref 0–0.3)
EOSINOPHIL NFR BLD AUTO: 2.5 % (ref 0–3)
ERYTHROCYTE [DISTWIDTH] IN BLOOD BY AUTOMATED COUNT: 21.3 % (ref 11.3–14.5)
GFR SERPL CREATININE-BSD FRML MDRD: 107 ML/MIN/1.73
GLOBULIN UR ELPH-MCNC: 3 GM/DL
GLUCOSE BLD-MCNC: 130 MG/DL (ref 70–100)
GLUCOSE UR STRIP-MCNC: NEGATIVE MG/DL
HCT VFR BLD AUTO: 41.6 % (ref 38.9–50.9)
HGB BLD-MCNC: 13.1 G/DL (ref 13.1–17.5)
HGB UR QL STRIP.AUTO: ABNORMAL
HYALINE CASTS UR QL AUTO: ABNORMAL /LPF
IMM GRANULOCYTES # BLD: 0.01 10*3/MM3 (ref 0–0.03)
IMM GRANULOCYTES NFR BLD: 0.2 % (ref 0–0.6)
KETONES UR QL STRIP: ABNORMAL
LEUKOCYTE ESTERASE UR QL STRIP.AUTO: ABNORMAL
LYMPHOCYTES # BLD AUTO: 1.12 10*3/MM3 (ref 0.6–4.8)
LYMPHOCYTES NFR BLD AUTO: 17.6 % (ref 24–44)
MCH RBC QN AUTO: 25.1 PG (ref 27–31)
MCHC RBC AUTO-ENTMCNC: 31.5 G/DL (ref 32–36)
MCV RBC AUTO: 79.8 FL (ref 80–99)
MONOCYTES # BLD AUTO: 0.76 10*3/MM3 (ref 0–1)
MONOCYTES NFR BLD AUTO: 11.9 % (ref 0–12)
NEUTROPHILS # BLD AUTO: 4.3 10*3/MM3 (ref 1.5–8.3)
NEUTROPHILS NFR BLD AUTO: 67.5 % (ref 41–71)
NITRITE UR QL STRIP: POSITIVE
PH UR STRIP.AUTO: <=5 [PH] (ref 5–8)
PLATELET # BLD AUTO: 161 10*3/MM3 (ref 150–450)
PMV BLD AUTO: 9.4 FL (ref 6–12)
POTASSIUM BLD-SCNC: 4.2 MMOL/L (ref 3.5–5.5)
PROT SERPL-MCNC: 5.9 G/DL (ref 5.7–8.2)
PROT UR QL STRIP: ABNORMAL
RBC # BLD AUTO: 5.21 10*6/MM3 (ref 4.2–5.76)
RBC # UR: ABNORMAL /HPF
REF LAB TEST METHOD: ABNORMAL
SODIUM BLD-SCNC: 138 MMOL/L (ref 132–146)
SP GR UR STRIP: 1.02 (ref 1–1.03)
SQUAMOUS #/AREA URNS HPF: ABNORMAL /HPF
UROBILINOGEN UR QL STRIP: ABNORMAL
WBC NRBC COR # BLD: 6.37 10*3/MM3 (ref 3.5–10.8)
WBC UR QL AUTO: ABNORMAL /HPF

## 2017-08-19 PROCEDURE — 87186 SC STD MICRODIL/AGAR DIL: CPT | Performed by: PHYSICIAN ASSISTANT

## 2017-08-19 PROCEDURE — 87086 URINE CULTURE/COLONY COUNT: CPT | Performed by: PHYSICIAN ASSISTANT

## 2017-08-19 PROCEDURE — 87077 CULTURE AEROBIC IDENTIFY: CPT | Performed by: PHYSICIAN ASSISTANT

## 2017-08-19 PROCEDURE — 85025 COMPLETE CBC W/AUTO DIFF WBC: CPT | Performed by: PHYSICIAN ASSISTANT

## 2017-08-19 PROCEDURE — 80053 COMPREHEN METABOLIC PANEL: CPT | Performed by: PHYSICIAN ASSISTANT

## 2017-08-19 PROCEDURE — 99284 EMERGENCY DEPT VISIT MOD MDM: CPT

## 2017-08-19 PROCEDURE — 81001 URINALYSIS AUTO W/SCOPE: CPT | Performed by: PHYSICIAN ASSISTANT

## 2017-08-20 PROBLEM — T83.511A URINARY TRACT INFECTION ASSOCIATED WITH INDWELLING URETHRAL CATHETER (HCC): Status: ACTIVE | Noted: 2017-08-20

## 2017-08-20 PROBLEM — R31.9 HEMATURIA: Status: ACTIVE | Noted: 2017-08-20

## 2017-08-20 PROBLEM — T83.038A: Status: ACTIVE | Noted: 2017-08-20

## 2017-08-20 PROBLEM — Z97.8 CHRONIC INDWELLING FOLEY CATHETER: Status: ACTIVE | Noted: 2017-08-20

## 2017-08-20 PROBLEM — N39.0 URINARY TRACT INFECTION ASSOCIATED WITH INDWELLING URETHRAL CATHETER (HCC): Status: ACTIVE | Noted: 2017-08-20

## 2017-08-20 LAB
ANION GAP SERPL CALCULATED.3IONS-SCNC: 2 MMOL/L (ref 3–11)
BASOPHILS # BLD AUTO: 0.02 10*3/MM3 (ref 0–0.2)
BASOPHILS NFR BLD AUTO: 0.4 % (ref 0–1)
BUN BLD-MCNC: 21 MG/DL (ref 9–23)
BUN/CREAT SERPL: 35 (ref 7–25)
CALCIUM SPEC-SCNC: 8.2 MG/DL (ref 8.7–10.4)
CHLORIDE SERPL-SCNC: 105 MMOL/L (ref 99–109)
CO2 SERPL-SCNC: 30 MMOL/L (ref 20–31)
CREAT BLD-MCNC: 0.6 MG/DL (ref 0.6–1.3)
DEPRECATED RDW RBC AUTO: 63.5 FL (ref 37–54)
EOSINOPHIL # BLD AUTO: 0.16 10*3/MM3 (ref 0–0.3)
EOSINOPHIL NFR BLD AUTO: 3.1 % (ref 0–3)
ERYTHROCYTE [DISTWIDTH] IN BLOOD BY AUTOMATED COUNT: 21.7 % (ref 11.3–14.5)
GFR SERPL CREATININE-BSD FRML MDRD: 128 ML/MIN/1.73
GLUCOSE BLD-MCNC: 136 MG/DL (ref 70–100)
HCT VFR BLD AUTO: 39.6 % (ref 38.9–50.9)
HGB BLD-MCNC: 12.4 G/DL (ref 13.1–17.5)
IMM GRANULOCYTES # BLD: 0.02 10*3/MM3 (ref 0–0.03)
IMM GRANULOCYTES NFR BLD: 0.4 % (ref 0–0.6)
LYMPHOCYTES # BLD AUTO: 1.31 10*3/MM3 (ref 0.6–4.8)
LYMPHOCYTES NFR BLD AUTO: 25 % (ref 24–44)
MCH RBC QN AUTO: 25.2 PG (ref 27–31)
MCHC RBC AUTO-ENTMCNC: 31.3 G/DL (ref 32–36)
MCV RBC AUTO: 80.5 FL (ref 80–99)
MONOCYTES # BLD AUTO: 0.64 10*3/MM3 (ref 0–1)
MONOCYTES NFR BLD AUTO: 12.2 % (ref 0–12)
NEUTROPHILS # BLD AUTO: 3.09 10*3/MM3 (ref 1.5–8.3)
NEUTROPHILS NFR BLD AUTO: 58.9 % (ref 41–71)
PLATELET # BLD AUTO: 144 10*3/MM3 (ref 150–450)
PMV BLD AUTO: 9.1 FL (ref 6–12)
POTASSIUM BLD-SCNC: 3.8 MMOL/L (ref 3.5–5.5)
RBC # BLD AUTO: 4.92 10*6/MM3 (ref 4.2–5.76)
SODIUM BLD-SCNC: 137 MMOL/L (ref 132–146)
WBC NRBC COR # BLD: 5.24 10*3/MM3 (ref 3.5–10.8)

## 2017-08-20 PROCEDURE — 85025 COMPLETE CBC W/AUTO DIFF WBC: CPT | Performed by: INTERNAL MEDICINE

## 2017-08-20 PROCEDURE — 99223 1ST HOSP IP/OBS HIGH 75: CPT | Performed by: INTERNAL MEDICINE

## 2017-08-20 PROCEDURE — 80048 BASIC METABOLIC PNL TOTAL CA: CPT | Performed by: NURSE PRACTITIONER

## 2017-08-20 RX ORDER — ACETAMINOPHEN 325 MG/1
650 TABLET ORAL EVERY 4 HOURS PRN
Status: DISCONTINUED | OUTPATIENT
Start: 2017-08-20 | End: 2017-08-21 | Stop reason: HOSPADM

## 2017-08-20 RX ORDER — CALCIUM CARBONATE 200(500)MG
1 TABLET,CHEWABLE ORAL DAILY
Status: DISCONTINUED | OUTPATIENT
Start: 2017-08-20 | End: 2017-08-21 | Stop reason: HOSPADM

## 2017-08-20 RX ORDER — SODIUM CHLORIDE 0.9 % (FLUSH) 0.9 %
1-10 SYRINGE (ML) INJECTION AS NEEDED
Status: DISCONTINUED | OUTPATIENT
Start: 2017-08-20 | End: 2017-08-21 | Stop reason: HOSPADM

## 2017-08-20 RX ORDER — MELATONIN
1000 2 TIMES DAILY
Status: DISCONTINUED | OUTPATIENT
Start: 2017-08-20 | End: 2017-08-21 | Stop reason: HOSPADM

## 2017-08-20 RX ORDER — FAMOTIDINE 20 MG/1
20 TABLET, FILM COATED ORAL DAILY
Status: DISCONTINUED | OUTPATIENT
Start: 2017-08-20 | End: 2017-08-21 | Stop reason: HOSPADM

## 2017-08-20 RX ORDER — DOCUSATE SODIUM 100 MG/1
100 CAPSULE, LIQUID FILLED ORAL 2 TIMES DAILY PRN
Status: DISCONTINUED | OUTPATIENT
Start: 2017-08-20 | End: 2017-08-21 | Stop reason: HOSPADM

## 2017-08-20 RX ORDER — TAMSULOSIN HYDROCHLORIDE 0.4 MG/1
0.4 CAPSULE ORAL NIGHTLY
Status: DISCONTINUED | OUTPATIENT
Start: 2017-08-20 | End: 2017-08-21 | Stop reason: HOSPADM

## 2017-08-20 RX ORDER — MULTIPLE VITAMINS W/ MINERALS TAB 9MG-400MCG
1 TAB ORAL DAILY
Status: DISCONTINUED | OUTPATIENT
Start: 2017-08-20 | End: 2017-08-21 | Stop reason: HOSPADM

## 2017-08-20 RX ORDER — ASCORBIC ACID 500 MG
500 TABLET ORAL DAILY
Status: DISCONTINUED | OUTPATIENT
Start: 2017-08-20 | End: 2017-08-21 | Stop reason: HOSPADM

## 2017-08-20 RX ORDER — FINASTERIDE 5 MG/1
5 TABLET, FILM COATED ORAL DAILY
Status: DISCONTINUED | OUTPATIENT
Start: 2017-08-20 | End: 2017-08-21 | Stop reason: HOSPADM

## 2017-08-20 RX ORDER — SODIUM CHLORIDE 9 MG/ML
50 INJECTION, SOLUTION INTRAVENOUS CONTINUOUS
Status: DISCONTINUED | OUTPATIENT
Start: 2017-08-20 | End: 2017-08-21

## 2017-08-20 RX ORDER — ONDANSETRON 2 MG/ML
4 INJECTION INTRAMUSCULAR; INTRAVENOUS EVERY 6 HOURS PRN
Status: DISCONTINUED | OUTPATIENT
Start: 2017-08-20 | End: 2017-08-21 | Stop reason: HOSPADM

## 2017-08-20 RX ORDER — ONDANSETRON 4 MG/1
4 TABLET, FILM COATED ORAL EVERY 6 HOURS PRN
Status: DISCONTINUED | OUTPATIENT
Start: 2017-08-20 | End: 2017-08-21 | Stop reason: HOSPADM

## 2017-08-20 RX ORDER — GABAPENTIN 400 MG/1
400 CAPSULE ORAL 3 TIMES DAILY
Status: DISCONTINUED | OUTPATIENT
Start: 2017-08-20 | End: 2017-08-21 | Stop reason: HOSPADM

## 2017-08-20 RX ORDER — METOPROLOL TARTRATE 50 MG/1
50 TABLET, FILM COATED ORAL EVERY 12 HOURS SCHEDULED
Status: DISCONTINUED | OUTPATIENT
Start: 2017-08-20 | End: 2017-08-21 | Stop reason: HOSPADM

## 2017-08-20 RX ADMIN — SODIUM CHLORIDE 100 ML/HR: 9 INJECTION, SOLUTION INTRAVENOUS at 06:22

## 2017-08-20 RX ADMIN — FAMOTIDINE 20 MG: 20 TABLET, FILM COATED ORAL at 08:56

## 2017-08-20 RX ADMIN — FINASTERIDE 5 MG: 5 TABLET, FILM COATED ORAL at 09:01

## 2017-08-20 RX ADMIN — GABAPENTIN 400 MG: 400 CAPSULE ORAL at 22:11

## 2017-08-20 RX ADMIN — Medication 1 TABLET: at 11:01

## 2017-08-20 RX ADMIN — CEFEPIME HYDROCHLORIDE 1 G: 1 INJECTION, POWDER, FOR SOLUTION INTRAMUSCULAR; INTRAVENOUS at 14:02

## 2017-08-20 RX ADMIN — TAMSULOSIN HYDROCHLORIDE 0.4 MG: 0.4 CAPSULE ORAL at 22:12

## 2017-08-20 RX ADMIN — CEFEPIME HYDROCHLORIDE 1 G: 1 INJECTION, POWDER, FOR SOLUTION INTRAMUSCULAR; INTRAVENOUS at 22:12

## 2017-08-20 RX ADMIN — MULTIPLE VITAMINS W/ MINERALS TAB 1 TABLET: TAB ORAL at 08:54

## 2017-08-20 RX ADMIN — VITAMIN D, TAB 1000IU (100/BT) 1000 UNITS: 25 TAB at 22:12

## 2017-08-20 RX ADMIN — CEFEPIME HYDROCHLORIDE 1 G: 1 INJECTION, POWDER, FOR SOLUTION INTRAMUSCULAR; INTRAVENOUS at 06:22

## 2017-08-20 RX ADMIN — ACETAMINOPHEN 650 MG: 325 TABLET, FILM COATED ORAL at 22:34

## 2017-08-20 RX ADMIN — ACETAMINOPHEN 650 MG: 325 TABLET, FILM COATED ORAL at 10:58

## 2017-08-20 RX ADMIN — GABAPENTIN 400 MG: 400 CAPSULE ORAL at 16:29

## 2017-08-20 RX ADMIN — OXYCODONE HYDROCHLORIDE AND ACETAMINOPHEN 500 MG: 500 TABLET ORAL at 08:56

## 2017-08-20 RX ADMIN — VITAMIN D, TAB 1000IU (100/BT) 1000 UNITS: 25 TAB at 08:55

## 2017-08-20 RX ADMIN — METOPROLOL TARTRATE 50 MG: 50 TABLET, FILM COATED ORAL at 08:56

## 2017-08-20 RX ADMIN — METOPROLOL TARTRATE 50 MG: 50 TABLET, FILM COATED ORAL at 22:11

## 2017-08-20 RX ADMIN — GABAPENTIN 400 MG: 400 CAPSULE ORAL at 08:55

## 2017-08-20 NOTE — CONSULTS
Calderon Scott  11/25/1932  [id number]    Date of Consult: 8/20/2017 8/19/2017      Requesting Provider: No ref. provider found  Evaluating Physician: Anthony Jackson MD    Chief Complaint: Dysuria and Oneal catheter dysfunction and abnormal urinalysis    Reason for Consultation: As above    History of present illness:    Patient is a 84 y.o.  Yr old male PT OF DR WOLF, with history of chronic Oneal catheter, managed at [facility] with chronic debility.  He was seen by Dr. Wolf with last inpatient visit approximately August 1 associated with abnormal urine culture from July 28 with pseudomonas aeruginosa.  He received some cefepime but unclear exact duration.  He reports having Oneal catheter changed by his home health nurse on August 19.  He cannot recall the exact procedure but felt as if the catheter was tugged on, did not function appropriately and he subsequently had urination around the catheter associated with dysuria.  He came to the emergency room at [facility] with a Oneal catheter was replaced again.  Urinalysis was abnormal and cefepime started empirically.    He reports edema at his penis/scrotum.  He has no lower abdominal pain.  No hematuria or pyuria and no flank pain.  Dysuria symptom has improved.  Urine cultures pending.    No headache photophobia or neck stiffness.  No shortness of breath cough or hemoptysis.  No nausea vomiting diarrhea or abdominal pain.  He denies overt fevers or shaking chills.  No sweats.    Past Medical History:   Diagnosis Date   • A-fib    • Arthritis    • Back disorder     patient states he cannot walk or stand due to back pain since february 2017   • Cancer    • CHF (congestive heart failure)    • Chronic indwelling Oneal catheter 8/20/2017   • Deep vein thrombosis    • Fracture     left wrist, left hand > 10 years ago    • Hypertension        Past Surgical History:   Procedure Laterality Date   • KNEE SURGERY     • SKIN BIOPSY     • SKIN  CANCER EXCISION      left ear removed, multiple areas removed    • SKIN SURGERY      basal cell and melanoma   • VASECTOMY         Pediatric History   Patient Guardian Status   • Not on file.     Other Topics Concern   • Not on file     Social History Narrative   He denies tobacco alcohol or illicit drugs    family history includes Arthritis in his sister; Cancer in his father.    Allergies   Allergen Reactions   • Penicillins Hives       Medication:  Current Facility-Administered Medications   Medication Dose Route Frequency Provider Last Rate Last Dose   • acetaminophen (TYLENOL) tablet 650 mg  650 mg Oral Q4H PRN Reba Soria APRN   650 mg at 08/20/17 1058   • calcium carbonate (TUMS) chewable tablet 500 mg (200 mg elemental)  1 tablet Oral Daily Sonia Plata MD   1 tablet at 08/20/17 1101   • cefepime (MAXIPIME) 1 g/100 mL 0.9% NS IVPB (mbp)  1 g Intravenous Q8H Reba Soria APRN   1 g at 08/20/17 0622   • cholecalciferol (VITAMIN D3) tablet 1,000 Units  1,000 Units Oral BID Sonia Plata MD   1,000 Units at 08/20/17 0855   • docusate sodium (COLACE) capsule 100 mg  100 mg Oral BID PRN BAL Beckett       • famotidine (PEPCID) tablet 20 mg  20 mg Oral Daily Sonia Plata MD   20 mg at 08/20/17 0856   • finasteride (PROSCAR) tablet 5 mg  5 mg Oral Daily Sonia Plata MD   5 mg at 08/20/17 0901   • gabapentin (NEURONTIN) capsule 400 mg  400 mg Oral TID Sonia Plata MD   400 mg at 08/20/17 0855   • metoprolol tartrate (LOPRESSOR) tablet 50 mg  50 mg Oral Q12H Sonia Plata MD   50 mg at 08/20/17 0856   • multivitamin with minerals 1 tablet  1 tablet Oral Daily Sonia Plata MD   1 tablet at 08/20/17 0854   • ondansetron (ZOFRAN) tablet 4 mg  4 mg Oral Q6H PRN BAL Beckett        Or   • ondansetron (ZOFRAN) injection 4 mg  4 mg Intravenous Q6H PRN BAL Beckett       • sodium chloride 0.9 % flush 1-10 mL  1-10 mL Intravenous PRN BAL Beckett       • sodium chloride  "0.9 % infusion  100 mL/hr Intravenous Continuous BAL Beckett 100 mL/hr at 08/20/17 0622 100 mL/hr at 08/20/17 0622   • tamsulosin (FLOMAX) 24 hr capsule 0.4 mg  0.4 mg Oral Nightly Sonia Plata MD       • vitamin C (ASCORBIC ACID) tablet 500 mg  500 mg Oral Daily Sonia Plata MD   500 mg at 08/20/17 0856       Antibiotics:  IV Anti-Infectives     Ordered     Dose/Rate Route Frequency Start Stop    08/20/17 0421  cefepime (MAXIPIME) 1 g/100 mL 0.9% NS IVPB (mbp)     Ordering Provider:  BAL Beckett    1 g Intravenous Every 8 Hours Scheduled 08/20/17 0500              Review of Systems    Constitutional-- No Fever, chills or sweats.  Appetite good, and no malaise. Generally he is fatigued  Heent-- No new vision, hearing or throat complaints.  No epistaxis or oral sores.  Denies odynophagia or dysphagia.  No flashers, floaters or eye pain. No odynophagia or dysphagia. No headache, photophobia or neck stiffness.  CV-- No chest pain, palpitation or syncope  Resp-- No SOB/cough/Hemoptysis  GI- No nausea, vomiting, or diarrhea.  No hematochezia, melena, or hematemesis. Denies jaundice or chronic liver disease.  -- as above  Lymph- no swollen lymph nodes in neck/axilla or groin.   Heme- No active bruising or bleeding; no Hx of DVT or PE.  MS-- no acute swelling or pain in the bones or joints of arms/legs.  No new back pain.  Neuro-- chronically debilitated and nonambulatory    Full 12 point review of systems reviewed and negative otherwise for acute complaints, except for above    Physical Exam:   Vital Signs   /88 (BP Location: Right arm, Patient Position: Lying)  Pulse 63  Temp 96.1 °F (35.6 °C) (Oral)   Resp 18  Ht 74\" (188 cm)  Wt 263 lb 11.2 oz (120 kg)  SpO2 98%  BMI 33.86 kg/m2    GENERAL: Awake and alert, in no acute distress.  Chronically ill appearing  HEENT: Normocephalic, atraumatic.  PERRL. EOMI. No conjunctival injection. No icterus. Oropharynx clear without evidence of " thrush or exudate. No evidence of peridontal disease.    NECK: Supple without nuchal rigidity. No mass.  LYMPH: No cervical, axillary or inguinal lymphadenopathy.  HEART: RRR; No murmur, rubs, gallops.   LUNGS: Clear to auscultation bilaterally without wheezing, rales, rhonchi. Normal respiratory effort. Nonlabored. No dullness.  ABDOMEN: Soft, nontender, nondistended. Positive bowel sounds. No rebound or guarding. NO mass or HSM.  EXT:  No cyanosis, clubbing. No cord.  He has edema at both legs  : Genitalia with vague edema at penis/scrotum but no overt redness induration or warmth.  No mass bulge or fluctuance.  No crepitus or bulla.  Indwelling Oneal catheter.  MSK: FROM without joint effusions noted arms/legs.    SKIN: Warm and dry without cutaneous eruptions on Inspection/palpation.    NEURO: Generally debilitated and he has difficulty cooperating with a motor/sensory exam with positioning in bed  PSYCHIATRIC: Normal insight and judgement. Cooperative with PE    Laboratory Data      Results from last 7 days  Lab Units 08/20/17  0911 08/19/17  2221   WBC 10*3/mm3 5.24 6.37   HEMOGLOBIN g/dL 12.4* 13.1   HEMATOCRIT % 39.6 41.6   PLATELETS 10*3/mm3 144* 161       Results from last 7 days  Lab Units 08/20/17  0911   SODIUM mmol/L 137   POTASSIUM mmol/L 3.8   CHLORIDE mmol/L 105   CO2 mmol/L 30.0   BUN mg/dL 21   CREATININE mg/dL 0.60   GLUCOSE mg/dL 136*   CALCIUM mg/dL 8.2*       Results from last 7 days  Lab Units 08/19/17  2221   ALK PHOS U/L 106*   BILIRUBIN mg/dL 0.4   ALT (SGPT) U/L 18   AST (SGOT) U/L 23               Estimated Creatinine Clearance: 94.6 mL/min (by C-G formula based on Cr of 0.6).      Microbiology:      Radiology:  Imaging Results (last 72 hours)     ** No results found for the last 72 hours. **            Impression:   --Acute dysuria associated with Oneal catheter dysfunction, possible obstructed Oneal catheter given his description of urine coming out around the catheter at home.   Owusu catheter changed again and appears to be functioning well according to nursing.  Further compounded by penile/scrotal edema but no obvious cellulitis.  Urine is abnormal but difficult to interpret in the setting of chronic Owusu catheter.  Urine culture pending.  I suspect the primary problem here was Owusu catheter dysfunction/obstruction and not a surprise he had some dysuria associated with that; and,  without constitutional symptoms and without leukocytosis it is difficult to know the significance of his abnormal urinalysis in the setting of a chronic Owusu catheter (as chronic bacterial colonization and chronic pyruria/bacteria on U/A not a surprise with chronic owusu) and difficult to make a definitive diagnosis of UTI at present.  Further decisions to depend on culture data and clinical course.    --Chronic Owusu catheter    --Penile/scrotal edema.  Chronicity unclear.  Nursing attempting general supportive measures    PLAN: Thank you for asking us to see Calderon Scott, I recommend the following:     --IV cefepime for now but may not require lengthy treatment course depending on clinical course/study results/culture data, etc.    --I discussed potential risks and benefits of the prescribed antibiotics that include, but are not limited to, solid organ toxicity, neuro toxicity, renal toxicity, CDiff, cytopenias, hypersensitivity,  etc.. Patient voices understanding and agree to proceed.    --Check/review labs cultures and scans    --Discussed with nursing.  Partial history per them    --Discussed with microbiology.    --Highly complex chronic issues with high risk for further serious morbidity and other serious sequela    --Dr. Wolf back on Monday to resume care    Anthony Jackson MD  8/20/2017

## 2017-08-20 NOTE — ED PROVIDER NOTES
Subjective   HPI Comments: Calderon Scott is a 84 y.o.male who presents to the ED with c/o urine leakage from a owusu catheter. [facility] were told by his home health nurse that he also developed new onset leg swelling today. She wrapped his legs with a bandage prior to arrival. In the ED, he does not note any penile pain or swelling. Other than dysuria, there are no other acute complaints at this time.   He states his legs are always swollen, not new, not a presenting problem today.    Patient is a 84 y.o. male presenting with general illness.   History provided by:  Patient  Illness   Location:  Penis  Quality:  Owusu catheter leakage  Severity:  Mild  Onset quality:  Sudden  Timing:  Constant  Progression:  Unchanged  Chronicity:  New  Context:  Leaking owusu catheter and dysuris  Relieved by:  Nothing  Worsened by:  Nothing  Ineffective treatments:  None tried      Review of Systems   Cardiovascular: Positive for leg swelling.   Genitourinary: Positive for dysuria. Negative for penile pain and penile swelling.        Leakage from owusu catheter   All other systems reviewed and are negative.      Past Medical History:   Diagnosis Date   • A-fib    • Arthritis    • Back disorder     patient states he cannot walk or stand due to back pain since february 2017   • Cancer    • CHF (congestive heart failure)    • Deep vein thrombosis    • Fracture     left wrist, left hand > 10 years ago    • Hypertension        Allergies   Allergen Reactions   • Penicillins Hives       Past Surgical History:   Procedure Laterality Date   • KNEE SURGERY     • SKIN BIOPSY     • SKIN CANCER EXCISION      left ear removed, multiple areas removed    • SKIN SURGERY      basal cell and melanoma   • VASECTOMY         Family History   Problem Relation Age of Onset   • Cancer Father    • Arthritis Sister        Social History     Social History   • Marital status:      Spouse name: N/A   • Number of children: N/A   • Years of  education: N/A     Social History Main Topics   • Smoking status: Never Smoker   • Smokeless tobacco: Never Used   • Alcohol use No   • Drug use: No   • Sexual activity: Defer     Other Topics Concern   • None     Social History Narrative         Objective   Physical Exam   Constitutional: He is oriented to person, place, and time. He appears well-developed and well-nourished. No distress.   HENT:   Head: Normocephalic and atraumatic.   Right Ear: External ear normal.   Left Ear: External ear normal.   Mouth/Throat: Oropharynx is clear and moist.   Eyes: Conjunctivae and EOM are normal. Pupils are equal, round, and reactive to light. Right eye exhibits no discharge. Left eye exhibits no discharge.   Neck: Normal range of motion. Neck supple. No JVD present.   Cardiovascular: Normal rate, regular rhythm and normal heart sounds.    No murmur heard.  Pulmonary/Chest: Effort normal and breath sounds normal. No stridor. No respiratory distress. He has no wheezes. He has no rales. He exhibits no tenderness.   Abdominal: Soft. Bowel sounds are normal. He exhibits no distension and no mass. There is no tenderness. There is no guarding. No hernia.   Genitourinary: Penile erythema present. No penile tenderness.   Genitourinary Comments: Oneal catheter in place with mild swelling and erythema of the foreskin and upper scrotum extending around the urethral meatus.    Musculoskeletal: Normal range of motion.   Lymphadenopathy:     He has no cervical adenopathy.   Neurological: He is alert and oriented to person, place, and time. No cranial nerve deficit. He exhibits normal muscle tone.   Skin: Skin is warm and dry.   Psychiatric: He has a normal mood and affect. His behavior is normal.   Nursing note and vitals reviewed.      Procedures         ED Course  ED Course   Comment By Time   Per nurse, patient had 25 cc of saline removal from Oneal balloon, when catheter was being removed there was noted to be blood in his urine, when  the Oneal was replaced there was a significant amount of blood returned that quickly cleared with urine flow.  Patient states his catheterization earlier today at the nursing home was painful, states catheterization tonight in the emergency department was not painful. Wale Pineda PA-C 08/20 0040     Recent Results (from the past 24 hour(s))   CBC Auto Differential    Collection Time: 08/19/17 10:21 PM   Result Value Ref Range    WBC 6.37 3.50 - 10.80 10*3/mm3    RBC 5.21 4.20 - 5.76 10*6/mm3    Hemoglobin 13.1 13.1 - 17.5 g/dL    Hematocrit 41.6 38.9 - 50.9 %    MCV 79.8 (L) 80.0 - 99.0 fL    MCH 25.1 (L) 27.0 - 31.0 pg    MCHC 31.5 (L) 32.0 - 36.0 g/dL    RDW 21.3 (H) 11.3 - 14.5 %    RDW-SD 62.3 (H) 37.0 - 54.0 fl    MPV 9.4 6.0 - 12.0 fL    Platelets 161 150 - 450 10*3/mm3    Neutrophil % 67.5 41.0 - 71.0 %    Lymphocyte % 17.6 (L) 24.0 - 44.0 %    Monocyte % 11.9 0.0 - 12.0 %    Eosinophil % 2.5 0.0 - 3.0 %    Basophil % 0.3 0.0 - 1.0 %    Immature Grans % 0.2 0.0 - 0.6 %    Neutrophils, Absolute 4.30 1.50 - 8.30 10*3/mm3    Lymphocytes, Absolute 1.12 0.60 - 4.80 10*3/mm3    Monocytes, Absolute 0.76 0.00 - 1.00 10*3/mm3    Eosinophils, Absolute 0.16 0.00 - 0.30 10*3/mm3    Basophils, Absolute 0.02 0.00 - 0.20 10*3/mm3    Immature Grans, Absolute 0.01 0.00 - 0.03 10*3/mm3   Comprehensive Metabolic Panel    Collection Time: 08/19/17 10:21 PM   Result Value Ref Range    Glucose 130 (H) 70 - 100 mg/dL    BUN 25 (H) 9 - 23 mg/dL    Creatinine 0.70 0.60 - 1.30 mg/dL    Sodium 138 132 - 146 mmol/L    Potassium 4.2 3.5 - 5.5 mmol/L    Chloride 106 99 - 109 mmol/L    CO2 32.0 (H) 20.0 - 31.0 mmol/L    Calcium 8.4 (L) 8.7 - 10.4 mg/dL    Total Protein 5.9 5.7 - 8.2 g/dL    Albumin 2.90 (L) 3.20 - 4.80 g/dL    ALT (SGPT) 18 7 - 40 U/L    AST (SGOT) 23 0 - 33 U/L    Alkaline Phosphatase 106 (H) 25 - 100 U/L    Total Bilirubin 0.4 0.3 - 1.2 mg/dL    eGFR Non African Amer 107 >60 mL/min/1.73    Globulin 3.0 gm/dL     A/G Ratio 1.0 (L) 1.5 - 2.5 g/dL    BUN/Creatinine Ratio 35.7 (H) 7.0 - 25.0    Anion Gap 0.0 (L) 3.0 - 11.0 mmol/L   Urinalysis With / Culture If Indicated    Collection Time: 08/19/17 10:43 PM   Result Value Ref Range    Color, UA Red (A) Yellow, Straw    Appearance, UA Cloudy (A) Clear    pH, UA <=5.0 5.0 - 8.0    Specific Gravity, UA 1.020 1.005 - 1.030    Glucose, UA Negative Negative    Ketones, UA Trace (A) Negative    Bilirubin, UA Small (1+) (A) Negative    Blood, UA Large (3+) (A) Negative    Protein, UA >=300 mg/dL (3+) (A) Negative    Leuk Esterase, UA Moderate (2+) (A) Negative    Nitrite, UA Positive (A) Negative    Urobilinogen, UA 2.0 E.U./dL (A) 0.2 - 1.0 E.U./dL   Urinalysis, Microscopic Only    Collection Time: 08/19/17 10:43 PM   Result Value Ref Range    RBC, UA Too Numerous to Count (A) None Seen, 0-2 /HPF    WBC, UA 13-20 (A) None Seen /HPF    Bacteria, UA Trace None Seen, Trace /HPF    Squamous Epithelial Cells, UA 0-2 None Seen, 0-2 /HPF    Hyaline Casts, UA 0-6 0 - 6 /LPF    Methodology Manual Light Microscopy      Note: In addition to lab results from this visit, the labs listed above may include labs taken at another facility or during a different encounter within the last 24 hours. Please correlate lab times with ED admission and discharge times for further clarification of the services performed during this visit.    No orders to display     Vitals:    08/20/17 0015 08/20/17 0030 08/20/17 0045 08/20/17 0059   BP: 166/90 156/85 156/92    Pulse: 91   74   Resp:       Temp:       TempSrc:       SpO2: 97% 97% 95%    Weight:       Height:         Medications - No data to display  ECG/EMG Results (last 24 hours)     ** No results found for the last 24 hours. **                 Recent Results (from the past 24 hour(s))   CBC Auto Differential    Collection Time: 08/19/17 10:21 PM   Result Value Ref Range    WBC 6.37 3.50 - 10.80 10*3/mm3    RBC 5.21 4.20 - 5.76 10*6/mm3    Hemoglobin 13.1 13.1  - 17.5 g/dL    Hematocrit 41.6 38.9 - 50.9 %    MCV 79.8 (L) 80.0 - 99.0 fL    MCH 25.1 (L) 27.0 - 31.0 pg    MCHC 31.5 (L) 32.0 - 36.0 g/dL    RDW 21.3 (H) 11.3 - 14.5 %    RDW-SD 62.3 (H) 37.0 - 54.0 fl    MPV 9.4 6.0 - 12.0 fL    Platelets 161 150 - 450 10*3/mm3    Neutrophil % 67.5 41.0 - 71.0 %    Lymphocyte % 17.6 (L) 24.0 - 44.0 %    Monocyte % 11.9 0.0 - 12.0 %    Eosinophil % 2.5 0.0 - 3.0 %    Basophil % 0.3 0.0 - 1.0 %    Immature Grans % 0.2 0.0 - 0.6 %    Neutrophils, Absolute 4.30 1.50 - 8.30 10*3/mm3    Lymphocytes, Absolute 1.12 0.60 - 4.80 10*3/mm3    Monocytes, Absolute 0.76 0.00 - 1.00 10*3/mm3    Eosinophils, Absolute 0.16 0.00 - 0.30 10*3/mm3    Basophils, Absolute 0.02 0.00 - 0.20 10*3/mm3    Immature Grans, Absolute 0.01 0.00 - 0.03 10*3/mm3   Comprehensive Metabolic Panel    Collection Time: 08/19/17 10:21 PM   Result Value Ref Range    Glucose 130 (H) 70 - 100 mg/dL    BUN 25 (H) 9 - 23 mg/dL    Creatinine 0.70 0.60 - 1.30 mg/dL    Sodium 138 132 - 146 mmol/L    Potassium 4.2 3.5 - 5.5 mmol/L    Chloride 106 99 - 109 mmol/L    CO2 32.0 (H) 20.0 - 31.0 mmol/L    Calcium 8.4 (L) 8.7 - 10.4 mg/dL    Total Protein 5.9 5.7 - 8.2 g/dL    Albumin 2.90 (L) 3.20 - 4.80 g/dL    ALT (SGPT) 18 7 - 40 U/L    AST (SGOT) 23 0 - 33 U/L    Alkaline Phosphatase 106 (H) 25 - 100 U/L    Total Bilirubin 0.4 0.3 - 1.2 mg/dL    eGFR Non African Amer 107 >60 mL/min/1.73    Globulin 3.0 gm/dL    A/G Ratio 1.0 (L) 1.5 - 2.5 g/dL    BUN/Creatinine Ratio 35.7 (H) 7.0 - 25.0    Anion Gap 0.0 (L) 3.0 - 11.0 mmol/L   Urinalysis With / Culture If Indicated    Collection Time: 08/19/17 10:43 PM   Result Value Ref Range    Color, UA Red (A) Yellow, Straw    Appearance, UA Cloudy (A) Clear    pH, UA <=5.0 5.0 - 8.0    Specific Gravity, UA 1.020 1.005 - 1.030    Glucose, UA Negative Negative    Ketones, UA Trace (A) Negative    Bilirubin, UA Small (1+) (A) Negative    Blood, UA Large (3+) (A) Negative    Protein, UA >=300  mg/dL (3+) (A) Negative    Leuk Esterase, UA Moderate (2+) (A) Negative    Nitrite, UA Positive (A) Negative    Urobilinogen, UA 2.0 E.U./dL (A) 0.2 - 1.0 E.U./dL   Urinalysis, Microscopic Only    Collection Time: 08/19/17 10:43 PM   Result Value Ref Range    RBC, UA Too Numerous to Count (A) None Seen, 0-2 /HPF    WBC, UA 13-20 (A) None Seen /HPF    Bacteria, UA Trace None Seen, Trace /HPF    Squamous Epithelial Cells, UA 0-2 None Seen, 0-2 /HPF    Hyaline Casts, UA 0-6 0 - 6 /LPF    Methodology Manual Light Microscopy      Note: In addition to lab results from this visit, the labs listed above may include labs taken at another facility or during a different encounter within the last 24 hours. Please correlate lab times with ED admission and discharge times for further clarification of the services performed during this visit.    No orders to display     Vitals:    08/20/17 0015 08/20/17 0030 08/20/17 0045 08/20/17 0059   BP: 166/90 156/85 156/92    Pulse: 91   74   Resp:       Temp:       TempSrc:       SpO2: 97% 97% 95%    Weight:       Height:         Medications - No data to display  ECG/EMG Results (last 24 hours)     ** No results found for the last 24 hours. **                [facility]    Final diagnoses:   Problem with Oneal catheter, initial encounter   Urinary tract infection associated with indwelling urethral catheter, initial encounter       Documentation assistance provided by jelena Li.  Information recorded by the jelena was done at my direction and has been verified and validated by me.     Mable Li  08/19/17 0997       Wale Pineda PA-C  08/20/17 [id number]

## 2017-08-20 NOTE — H&P
[facility] Medicine Services  HISTORY AND PHYSICAL    Primary Care Physician: BAL Farmer    Subjective     Chief Complaint: Urine leakage from owusu catheter    History of Present Illness:     Mr. 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 He has chronic indwelling Owusu catheter that is replaced every 4 weeks. He states that he's had multiple antibiotic courses for presumed urinary tract infection often drawn from his Owusu bag without any urinary symptoms or systemic signs of infection. 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 His last urine sample grew pseudomonas aeruginosa resistant to oral antibiotics. ID was consulted and given positive urine culture and fever, he was treated with short course of Cefepime 1g Q8H with plans for f/u with [facility] with goal of discontinuation of Owusu catheter due to increased risk of recurrent resistant organism UTI. F/U was appt scheduled for August 16, 2017, however, pt reports that he had to cancel appt due to not having a ride. He reports that he plans to follow up in the near future.    He presents to [facility] ED today for c/o urine leakage from his Owusu catheter. He states that today his home health nurse was replacing his Owusu catheter as per usual. However, after the catheter was inserted he reported pain and noted urinary leakage. He also reported dysuria. He denies F/C, pelvic pain, or flank pain, abdominal pain, N/V/D, constipation, or any other acute symptoms. He presented to  the ED for further evaluation of urinary leakage. In the ED, pt's Oneal catheter was removed and replaced with new catheter. Per nurse, pt had 25 cc of saline removed from Oneal balloon. Also, when catheter was being removed there was noted to be blood in his urine. When Oneal was replaced there was significant amount of blood returned that quickly cleared with urine flow. Urinalysis was sent which showed red, cloudy colored urine with large blood, moderate leukocytes, positive nitrites, and trace bacteria. He was diagnosed with UTI and treated with IV Cefepime. Hospital Medicine service was consulted for admission and further evaluation and management.     Review of Systems   Constitutional: Negative for chills and fever.   Respiratory: Negative for cough and shortness of breath.    Cardiovascular: Positive for leg swelling (chronic leg swelling). Negative for chest pain and palpitations.   Gastrointestinal: Negative for abdominal pain, blood in stool, constipation, diarrhea, nausea and vomiting.   Genitourinary: Positive for dysuria and penile pain. Negative for difficulty urinating, flank pain, frequency, hematuria, penile swelling, scrotal swelling, testicular pain and urgency.   Musculoskeletal:        Reports paraplegia     Skin: Negative for color change, pallor and rash.   Neurological: Negative for dizziness, syncope, weakness and light-headedness.   Psychiatric/Behavioral: Negative for confusion.   All other systems reviewed and are negative.     Otherwise complete ROS performed and negative except as mentioned in the HPI.    Past Medical History:   Diagnosis Date   • A-fib    • Arthritis    • Back disorder     patient states he cannot walk or stand due to back pain since february 2017   • Cancer    • CHF (congestive heart failure)    • Chronic indwelling Oneal catheter 8/20/2017   • Deep vein thrombosis    • Fracture     left wrist, left hand > 10 years ago    • Hypertension      Past Surgical History:    Procedure Laterality Date   • KNEE SURGERY     • SKIN BIOPSY     • SKIN CANCER EXCISION      left ear removed, multiple areas removed    • SKIN SURGERY      basal cell and melanoma   • VASECTOMY       Family History   Problem Relation Age of Onset   • Cancer Father    • Arthritis Sister      Social History     Social History   • Marital status:      Spouse name: N/A   • Number of children: N/A   • Years of education: N/A     Occupational History   • Not on file.     Social History Main Topics   • Smoking status: Never Smoker   • Smokeless tobacco: Never Used   • Alcohol use No   • Drug use: No   • Sexual activity: Defer     Other Topics Concern   • Not on file     Social History Narrative     Medications:  Prescriptions Prior to Admission   Medication Sig Dispense Refill Last Dose   • calcium carbonate (TUMS) 500 MG chewable tablet Chew 1 tablet Daily.   Taking   • cholecalciferol (VITAMIN D3) 1000 UNITS tablet Take 1 tablet by mouth 2 (Two) Times a Day. 180 tablet 1 Taking   • Digestive Enzymes (ACIDOLL) capsule Take  by mouth.   Taking   • famotidine (PEPCID) 20 MG tablet Take 1 tablet by mouth Daily. 90 tablet 1 Taking   • finasteride (PROSCAR) 5 MG tablet Take 1 tablet by mouth Daily. 90 tablet 1 Taking   • gabapentin (NEURONTIN) 400 MG capsule Take 1 capsule by mouth 3 (Three) Times a Day. 270 capsule 1 Taking   • Glucosamine-Chondroit-Vit C-Mn (GLUCOSAMINE CHONDR 1500 COMPLX PO) Take  by mouth.   Taking   • methenamine (HIPREX) 1 G tablet Take 1 tablet by mouth 2 (Two) Times a Day. 180 tablet 1 Taking   • metoprolol tartrate (LOPRESSOR) 50 MG tablet Take 1 tablet by mouth 2 (Two) Times a Day. 180 tablet 1 Taking   • Multiple Vitamins-Minerals (MULTIVITAMIN ADULT PO) Take  by mouth.   Taking   • nystatin (MYCOSTATIN) 803947 UNIT/GM ointment Apply  topically Every 12 (Twelve) Hours. 30 g 0 Taking   • oxyCODONE-acetaminophen (PERCOCET)  MG per tablet 1 by mouth every 4-6 hours as needed for pain 18  "tablet 0    • tamsulosin (FLOMAX) 0.4 MG capsule 24 hr capsule Take 1 capsule by mouth Daily. 90 capsule 1 Taking   • vitamin C (VITAMIN C) 500 MG tablet Take 1 tablet by mouth Daily. 30 tablet 0 Taking   • XARELTO 20 MG tablet Take 1 tablet by mouth Daily. 90 tablet 1 Taking     Allergies:  Allergies   Allergen Reactions   • Penicillins Hives     Objective     Physical Exam:  Vital Signs: /88 (BP Location: Left arm, Patient Position: Lying)  Pulse 72  Temp 97.3 °F (36.3 °C) (Oral)   Resp 18  Ht 74\" (188 cm)  Wt 263 lb 11.2 oz (120 kg)  SpO2 95%  BMI 33.86 kg/m2  Physical Exam   Constitutional: He is oriented to person, place, and time. He appears well-developed and well-nourished. He is cooperative. He is easily aroused.   Obese,  male. No apparent distress noted.   HENT:   Head: Normocephalic and atraumatic.   Eyes: EOM are normal. Pupils are equal, round, and reactive to light. No scleral icterus.   Neck: Normal range of motion. Neck supple. No thyromegaly present.   Cardiovascular:   Pulses:       Radial pulses are 2+ on the right side, and 2+ on the left side.        Dorsalis pedis pulses are 2+ on the right side, and 2+ on the left side.        Posterior tibial pulses are 2+ on the right side, and 2+ on the left side.   Pulmonary/Chest: Effort normal and breath sounds normal. No respiratory distress. He has no wheezes. He has no rales. He exhibits no tenderness.   Abdominal: Soft. Bowel sounds are normal. He exhibits no distension and no mass. There is no tenderness. There is no rebound and no guarding. No hernia.   Abdomen round, obese, soft, and non tender.   Musculoskeletal: He exhibits edema and tenderness.   Neurological: He is alert, oriented to person, place, and time and easily aroused. He exhibits abnormal muscle tone. GCS eye subscore is 4. GCS verbal subscore is 5. GCS motor subscore is 6.   Decreased ROM in bilateral lower extremities. Pt is able to slightly move lower " extremities against gravity, right > left. Sensation noted in BLE.    Skin: Skin is warm and dry.   Psychiatric: He has a normal mood and affect. His speech is normal and behavior is normal. Judgment and thought content normal. Cognition and memory are normal.   Vitals reviewed.    Results Reviewed:  Lab Results (last 24 hours)     Procedure Component Value Units Date/Time    CBC Auto Differential [[id number]]  (Abnormal) Collected:  08/19/17 2221    Specimen:  Blood Updated:  08/19/17 2248     WBC 6.37 10*3/mm3      RBC 5.21 10*6/mm3      Hemoglobin 13.1 g/dL      Hematocrit 41.6 %      MCV 79.8 (L) fL      MCH 25.1 (L) pg      MCHC 31.5 (L) g/dL      RDW 21.3 (H) %      RDW-SD 62.3 (H) fl      MPV 9.4 fL      Platelets 161 10*3/mm3      Neutrophil % 67.5 %      Lymphocyte % 17.6 (L) %      Monocyte % 11.9 %      Eosinophil % 2.5 %      Basophil % 0.3 %      Immature Grans % 0.2 %      Neutrophils, Absolute 4.30 10*3/mm3      Lymphocytes, Absolute 1.12 10*3/mm3      Monocytes, Absolute 0.76 10*3/mm3      Eosinophils, Absolute 0.16 10*3/mm3      Basophils, Absolute 0.02 10*3/mm3      Immature Grans, Absolute 0.01 10*3/mm3     Comprehensive Metabolic Panel [[id number]]  (Abnormal) Collected:  08/19/17 2221    Specimen:  Blood Updated:  08/19/17 2305     Glucose 130 (H) mg/dL      BUN 25 (H) mg/dL      Creatinine 0.70 mg/dL      Sodium 138 mmol/L      Potassium 4.2 mmol/L      Chloride 106 mmol/L      CO2 32.0 (H) mmol/L      Calcium 8.4 (L) mg/dL      Total Protein 5.9 g/dL      Albumin 2.90 (L) g/dL      ALT (SGPT) 18 U/L      AST (SGOT) 23 U/L      Alkaline Phosphatase 106 (H) U/L      Total Bilirubin 0.4 mg/dL      eGFR Non African Amer 107 mL/min/1.73      Globulin 3.0 gm/dL      A/G Ratio 1.0 (L) g/dL      BUN/Creatinine Ratio 35.7 (H)     Anion Gap 0.0 (L) mmol/L     Narrative:       [employer] Guidelines    Stage     Description        GFR  1         Normal or High     90+  2         Mild  decrease      60-89  3         Moderate decrease  30-59  4         Severe decrease    15-29  5         Kidney failure     <15    Urine Culture [[id number]] Collected:  08/19/17 2243    Specimen:  Urine from Urine, Clean Catch Updated:  08/19/17 2312    Urinalysis With / Culture If Indicated [[id number]]  (Abnormal) Collected:  08/19/17 2243    Specimen:  Urine from Urine, Clean Catch Updated:  08/19/17 2326     Color, UA Red (A)      Corrected result. Previous result was Marylin on 8/19/2017 at 2318 EDT        Appearance, UA Cloudy (A)      Corrected result. Previous result was Clear on 8/19/2017 at 2318 EDT        pH, UA <=5.0     Specific Gravity, UA 1.020     Glucose, UA Negative     Ketones, UA Trace (A)     Bilirubin, UA Small (1+) (A)     Blood, UA Large (3+) (A)     Protein, UA >=300 mg/dL (3+) (A)     Leuk Esterase, UA Moderate (2+) (A)     Nitrite, UA Positive (A)     Urobilinogen, UA 2.0 E.U./dL (A)    Urinalysis, Microscopic Only [[id number]]  (Abnormal) Collected:  08/19/17 2243    Specimen:  Urine from Urine, Clean Catch Updated:  08/19/17 2337     RBC, UA Too Numerous to Count (A) /HPF      WBC, UA 13-20 (A) /HPF      Bacteria, UA Trace /HPF      Squamous Epithelial Cells, UA 0-2 /HPF      Hyaline Casts, UA 0-6 /LPF      Methodology Manual Light Microscopy        I have personally reviewed and interpreted available lab data, radiology studies and ECG obtained at time of admission.     Assessment / Plan     Problem List:   Hospital Problem List     * (Principal)Urinary tract infection associated with indwelling urethral catheter    Chronic a-fib    Lower paraplegia    Overview Signed 8/1/2017  1:41 PM by Chelsie Cardozo MD     From spinal stenosis         Hematuria    Chronic indwelling Oneal catheter    Leakage from urinary catheter        Assessment/Plan:  Urinary tract infection associated with indwelling urethral catheter vs recurrent bacterial colonization with resistant organism  -Oneal catheter  replaced in the ED today, hematuria present, UA sent, diagnosed with UTI, given IV Cefepime  -will continue IV Cefepime 1gm Q8H; consult ID in the AM for recurrent UTI vs bacterial colonization  -strict I&O's, daily weights, follow urine culture; routine AM labs  -consult CM in the AM for discharge planning; pt will need outpatient f/u appt scheduled again with  urology  -consult PT/OT in the AM to assess mobility    DVT prophylaxis:  -TEDs and SCDs    Code Status:   -DNR    Admission Status: Patient will be admitted to (LEXOBS or LEXINPT)     Johnny Soria, BAL 08/20/17 5:21 AM      PHYSICIAN NOTE(ADDENDUM)    84-year-old male presented to the ER with the chief complaint of urine leaking around his Owusu catheter with some blood.    Patient has chronic indwelling Owusu catheter since February 2016 most likely secondary to incontinent/neurogenic bladder along with his enlarged prostate.  His Owusu catheter has been replaced every 4 weeks.  He was recently admitted with us in July with possible asymptomatic pseudomonas colonization vs infection-Rx with cefepime, and was suppose to fup with  urology.    Today after replacing owusu, UA  Was positive- but no wbc and no fever, er attending felt he should be Rx as this could be real infection, started on cefepime, and admitted for possible id input.    On exam no ABD pain, mild wheezing, co of dry cough-will order chest x ray prn nebs.    Pt chronically disabled and cannot walk 2nd to lower ext weakness from his herniated lumbar disc.    Chronic Afib-on lopressor and xarelto-will hold blood thinner 2nd to hematuria and monitor cbc closely.    Rest a/p as per Nps note.      I have independently seen and examined the patient with ARNP and the note above reflects my changes and contributions. I have discussed with findings, diagnosis and plans with the patient and family.     Sonia Plata MD 08/20/17 7:33 AM

## 2017-08-20 NOTE — PLAN OF CARE
Problem: Fall Risk (Adult)  Goal: Identify Related Risk Factors and Signs and Symptoms  Outcome: Ongoing (interventions implemented as appropriate)    08/20/17 0506   Fall Risk   Fall Risk: Related Risk Factors environment unfamiliar   Fall Risk: Signs and Symptoms presence of risk factors       Goal: Absence of Falls  Outcome: Ongoing (interventions implemented as appropriate)    08/20/17 0506   Fall Risk (Adult)   Absence of Falls making progress toward outcome

## 2017-08-20 NOTE — PROGRESS NOTES
Please see the history and physical dated today for full details.  Briefly is a chronically ill 84-year-old gentleman with a chronic Oneal catheter.  Recently discharged earlier this month after growing Pseudomonas from urine specimen.  Was seen by infectious disease and treated with cefepime.  Comes back to the emergency room after having some catheter difficulties including leakage and discomfort.  Catheter was replaced in the emergency room and apparently the ER physician was concerned about infection and so was admitted.  No known fevers, no leukocytosis.  Urine culture is pending.  He is our to been seen by Dr. Cobb from infectious disease who recommends continuing cefepime for now and will discuss with Dr. Lockett tomorrow.  I suspect this is related to colonization rather than true infection.  Possible discharge home tomorrow to follow-up with [facility].    Murphy Correa MD

## 2017-08-20 NOTE — PLAN OF CARE
Problem: Patient Care Overview (Adult)  Goal: Plan of Care Review  Outcome: Ongoing (interventions implemented as appropriate)    08/20/17 1531   Coping/Psychosocial Response Interventions   Plan Of Care Reviewed With patient   Patient Care Overview   Progress no change   Outcome Evaluation   Outcome Summary/Follow up Plan [facility] for chronic wound to right lateral ankle. Pt known to WO and has been follow by PT wound care at last admission for wound care and compression. Pt wears UNNA boots at home. reviewed photo from last admission (7/31), wound showing signs of improvment, using hydrafera blue and dry dressing last changed yesterday per patient. Wound 1.5x1.5x.3, small area lateral is not opened, but is darker then the surrounding skin. This appears to be epithialzied, but with discoloration. Wound cleaned with NS, apply NS moisted hydrafera blue then dry silicone foam dressing. PT wound care to manage wound and compression wraps. Buttock and coccyx red and blanchable. Medline boots applied to bilateral feet and waffle mattress on bed.  Will order Low Air loss mattress as patient is currently bed bound. [facility] nurse to follow for coccyx wound. - Thank you

## 2017-08-20 NOTE — PLAN OF CARE
Problem: Patient Care Overview (Adult)  Goal: Plan of Care Review  Outcome: Ongoing (interventions implemented as appropriate)  Goal: Adult Individualization and Mutuality  Outcome: Ongoing (interventions implemented as appropriate)  Goal: Discharge Needs Assessment  Outcome: Ongoing (interventions implemented as appropriate)    Problem: Urine Elimination, Impaired (Adult)  Goal: Identify Related Risk Factors and Signs and Symptoms  Outcome: Ongoing (interventions implemented as appropriate)  Goal: Effective Urinary Elimination  Outcome: Ongoing (interventions implemented as appropriate)  Goal: Effective Containment of Urine  Outcome: Ongoing (interventions implemented as appropriate)  Goal: Reduced Incontinence Episodes  Outcome: Ongoing (interventions implemented as appropriate)    Problem: Fall Risk (Adult)  Goal: Identify Related Risk Factors and Signs and Symptoms  Outcome: Ongoing (interventions implemented as appropriate)  Goal: Absence of Falls  Outcome: Ongoing (interventions implemented as appropriate)    Problem: Skin Integrity Impairment, Risk/Actual (Adult)  Goal: Identify Related Risk Factors and Signs and Symptoms  Outcome: Ongoing (interventions implemented as appropriate)  Goal: Skin Integrity/Wound Healing  Outcome: Ongoing (interventions implemented as appropriate)

## 2017-08-21 VITALS
TEMPERATURE: 98.6 F | RESPIRATION RATE: 18 BRPM | SYSTOLIC BLOOD PRESSURE: 165 MMHG | DIASTOLIC BLOOD PRESSURE: 98 MMHG | HEIGHT: 74 IN | BODY MASS INDEX: 34.63 KG/M2 | WEIGHT: 269.8 LBS | OXYGEN SATURATION: 98 % | HEART RATE: 63 BPM

## 2017-08-21 PROCEDURE — 97110 THERAPEUTIC EXERCISES: CPT

## 2017-08-21 PROCEDURE — 99239 HOSP IP/OBS DSCHRG MGMT >30: CPT | Performed by: NURSE PRACTITIONER

## 2017-08-21 PROCEDURE — 97166 OT EVAL MOD COMPLEX 45 MIN: CPT

## 2017-08-21 PROCEDURE — 97162 PT EVAL MOD COMPLEX 30 MIN: CPT

## 2017-08-21 PROCEDURE — 97530 THERAPEUTIC ACTIVITIES: CPT

## 2017-08-21 PROCEDURE — 29581 APPL MULTLAYER CMPRN SYS LEG: CPT

## 2017-08-21 RX ADMIN — VITAMIN D, TAB 1000IU (100/BT) 1000 UNITS: 25 TAB at 17:02

## 2017-08-21 RX ADMIN — FAMOTIDINE 20 MG: 20 TABLET, FILM COATED ORAL at 08:49

## 2017-08-21 RX ADMIN — MULTIPLE VITAMINS W/ MINERALS TAB 1 TABLET: TAB ORAL at 08:48

## 2017-08-21 RX ADMIN — ACETAMINOPHEN 650 MG: 325 TABLET, FILM COATED ORAL at 10:00

## 2017-08-21 RX ADMIN — GABAPENTIN 400 MG: 400 CAPSULE ORAL at 17:02

## 2017-08-21 RX ADMIN — FINASTERIDE 5 MG: 5 TABLET, FILM COATED ORAL at 08:49

## 2017-08-21 RX ADMIN — VITAMIN D, TAB 1000IU (100/BT) 1000 UNITS: 25 TAB at 08:49

## 2017-08-21 RX ADMIN — Medication 1 TABLET: at 08:51

## 2017-08-21 RX ADMIN — METOPROLOL TARTRATE 50 MG: 50 TABLET, FILM COATED ORAL at 08:50

## 2017-08-21 RX ADMIN — GABAPENTIN 400 MG: 400 CAPSULE ORAL at 08:48

## 2017-08-21 RX ADMIN — OXYCODONE HYDROCHLORIDE AND ACETAMINOPHEN 500 MG: 500 TABLET ORAL at 08:50

## 2017-08-21 RX ADMIN — CEFEPIME HYDROCHLORIDE 1 G: 1 INJECTION, POWDER, FOR SOLUTION INTRAMUSCULAR; INTRAVENOUS at 06:12

## 2017-08-21 NOTE — PLAN OF CARE
Problem: Patient Care Overview (Adult)  Goal: Discharge Needs Assessment  Outcome: Ongoing (interventions implemented as appropriate)    Problem: Urine Elimination, Impaired (Adult)  Goal: Identify Related Risk Factors and Signs and Symptoms  Outcome: Ongoing (interventions implemented as appropriate)  Goal: Effective Urinary Elimination  Outcome: Ongoing (interventions implemented as appropriate)  Goal: Effective Containment of Urine  Outcome: Ongoing (interventions implemented as appropriate)  Goal: Reduced Incontinence Episodes  Outcome: Ongoing (interventions implemented as appropriate)    Problem: Fall Risk (Adult)  Goal: Identify Related Risk Factors and Signs and Symptoms  Outcome: Ongoing (interventions implemented as appropriate)  Goal: Absence of Falls  Outcome: Ongoing (interventions implemented as appropriate)    Problem: Skin Integrity Impairment, Risk/Actual (Adult)  Goal: Identify Related Risk Factors and Signs and Symptoms  Outcome: Ongoing (interventions implemented as appropriate)  Goal: Skin Integrity/Wound Healing  Outcome: Ongoing (interventions implemented as appropriate)

## 2017-08-21 NOTE — PLAN OF CARE
Problem: Patient Care Overview (Adult)  Goal: Plan of Care Review  Outcome: Ongoing (interventions implemented as appropriate)    08/21/17 1400   Coping/Psychosocial Response Interventions   Plan Of Care Reviewed With patient   Outcome Evaluation   Outcome Summary/Follow up Plan Pt presents with chronic open wound to lateral R ankle. PT applied light compression to help increase venous return to improve healing potential and increase skin integrity          Problem: Inpatient Physical Therapy  Goal: Wound Care Goal 1 LTG- PT  Outcome: Ongoing (interventions implemented as appropriate)    08/21/17 1400   Wound Care PT LTG   Wound Care PT LTG 1, Date Established 08/21/17   Wound Care PT LTG 1, Time to Achieve other (see comments)  (10 days)   Wound Care PT LTG 1, Location BLEs   Wound Care PT LTG 1, No S&S of Infection yes   Wound Care PT LTG 1, Education wound care;edema management   Wound Care PT LTG 1, Education Understanding verbalize understanding   Wound Care PT LTG 1, Additional Goal Decrease BLE edema to minimal

## 2017-08-21 NOTE — PAYOR COMM NOTE
"Calderon Mckeon (84 y.o. Male)     Date of Birth Social Security Number Address Home Phone MRN    11/25/1932  [facility] [facility] [address] [phone] [id number]    Zoroastrianism Marital Status          None        Admission Date Admission Type Admitting Provider Attending Provider Department, Room/Bed    8/19/17 Emergency Murphy Correa MD Dossett, Lee M, MD [facility] 3E, S331/1    Discharge Date Discharge Disposition Discharge Destination                      Attending Provider: Murphy Correa MD     Allergies:  Penicillins    Isolation:  Contact   Infection:  MDR Pseudomonas (07/31/17)   Code Status:  Conditional    Ht:  74\" (188 cm)   Wt:  269 lb 12.8 oz (122 kg)    Admission Cmt:  None   Principal Problem:  Urinary tract infection associated with indwelling urethral catheter [T83.511A,N39.0]                 Active Insurance as of 8/19/2017     Primary Coverage     Payor Plan Insurance Group Employer/Plan Group    [facility] MEDICARE REPLACEMENT [facility] MEDICARE ADVANTAGE [id number]     Payor Plan Address Payor Plan Phone Number Effective From Effective To    [address] [phone] 1/1/2017     [place] [address]       Subscriber Name Subscriber Birth Date Member ID       CALDERON MCKEON 11/25/1932 [id number]                 Emergency Contacts      (Rel.) Home Phone Work Phone Mobile Phone    Roderick Mckeon (Son) [phone] -- --               History & Physical      Sonia Plata MD at 8/20/2017  4:16 AM              [facility] Medicine Services  HISTORY AND PHYSICAL    Primary Care Physician: BAL Farmer    Subjective     Chief Complaint: Urine leakage from owusu catheter    History of Present Illness:     Mr. Calderon Mckeon is an 84 year old  male with PMH significant for long-standing essential HTN, chronic atrial fibrillation, who has been bedridden since February 2017 due to spinal stenosis and multiple herniated " disc of the lumbar spine per patient, further complicated by urinary retention (unclear if due to neurogenic bladder or bladder outlet obstruction from enlarged prostate). He has chronic indwelling Oneal catheter that is replaced every 4 weeks. He states that he's had multiple antibiotic courses for presumed urinary tract infection often drawn from his Oneal bag without any urinary symptoms or systemic signs of infection. 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 His last urine sample grew pseudomonas aeruginosa resistant to oral antibiotics. ID was consulted and given positive urine culture and fever, he was treated with short course of Cefepime 1g Q8H with plans for f/u with [facility] with goal of discontinuation of Oneal catheter due to increased risk of recurrent resistant organism UTI. F/U was appt scheduled for August 16, 2017, however, pt reports that he had to cancel appt due to not having a ride. He reports that he plans to follow up in the near future.    He presents to [facility] ED today for c/o urine leakage from his Oneal catheter. He states that today his home health nurse was replacing his Oneal catheter as per usual. However, after the catheter was inserted he reported pain and noted urinary leakage. He also reported dysuria. He denies F/C, pelvic pain, or flank pain, abdominal pain, N/V/D, constipation, or any other acute symptoms. He presented to the ED for further evaluation of urinary leakage. In the ED, pt's Oneal catheter was removed and replaced with new catheter. Per nurse, pt had 25 cc of saline removed from Oneal balloon. Also, when catheter was being removed there was noted to be blood in his urine. When Oneal was replaced there was significant amount of blood returned that quickly cleared with urine flow. Urinalysis was sent which showed red, cloudy colored urine with large  blood, moderate leukocytes, positive nitrites, and trace bacteria. He was diagnosed with UTI and treated with IV Cefepime. Hospital Medicine service was consulted for admission and further evaluation and management.     Review of Systems   Constitutional: Negative for chills and fever.   Respiratory: Negative for cough and shortness of breath.    Cardiovascular: Positive for leg swelling (chronic leg swelling). Negative for chest pain and palpitations.   Gastrointestinal: Negative for abdominal pain, blood in stool, constipation, diarrhea, nausea and vomiting.   Genitourinary: Positive for dysuria and penile pain. Negative for difficulty urinating, flank pain, frequency, hematuria, penile swelling, scrotal swelling, testicular pain and urgency.   Musculoskeletal:        Reports paraplegia     Skin: Negative for color change, pallor and rash.   Neurological: Negative for dizziness, syncope, weakness and light-headedness.   Psychiatric/Behavioral: Negative for confusion.   All other systems reviewed and are negative.     Otherwise complete ROS performed and negative except as mentioned in the HPI.    Past Medical History:   Diagnosis Date   • A-fib    • Arthritis    • Back disorder     patient states he cannot walk or stand due to back pain since february 2017   • Cancer    • CHF (congestive heart failure)    • Chronic indwelling Oneal catheter 8/20/2017   • Deep vein thrombosis    • Fracture     left wrist, left hand > 10 years ago    • Hypertension      Past Surgical History:   Procedure Laterality Date   • KNEE SURGERY     • SKIN BIOPSY     • SKIN CANCER EXCISION      left ear removed, multiple areas removed    • SKIN SURGERY      basal cell and melanoma   • VASECTOMY       Family History   Problem Relation Age of Onset   • Cancer Father    • Arthritis Sister      Social History     Social History   • Marital status:      Spouse name: N/A   • Number of children: N/A   • Years of education: N/A      Occupational History   • Not on file.     Social History Main Topics   • Smoking status: Never Smoker   • Smokeless tobacco: Never Used   • Alcohol use No   • Drug use: No   • Sexual activity: Defer     Other Topics Concern   • Not on file     Social History Narrative     Medications:  Prescriptions Prior to Admission   Medication Sig Dispense Refill Last Dose   • calcium carbonate (TUMS) 500 MG chewable tablet Chew 1 tablet Daily.   Taking   • cholecalciferol (VITAMIN D3) 1000 UNITS tablet Take 1 tablet by mouth 2 (Two) Times a Day. 180 tablet 1 Taking   • Digestive Enzymes (ACIDOLL) capsule Take  by mouth.   Taking   • famotidine (PEPCID) 20 MG tablet Take 1 tablet by mouth Daily. 90 tablet 1 Taking   • finasteride (PROSCAR) 5 MG tablet Take 1 tablet by mouth Daily. 90 tablet 1 Taking   • gabapentin (NEURONTIN) 400 MG capsule Take 1 capsule by mouth 3 (Three) Times a Day. 270 capsule 1 Taking   • Glucosamine-Chondroit-Vit C-Mn (GLUCOSAMINE CHONDR 1500 COMPLX PO) Take  by mouth.   Taking   • methenamine (HIPREX) 1 G tablet Take 1 tablet by mouth 2 (Two) Times a Day. 180 tablet 1 Taking   • metoprolol tartrate (LOPRESSOR) 50 MG tablet Take 1 tablet by mouth 2 (Two) Times a Day. 180 tablet 1 Taking   • Multiple Vitamins-Minerals (MULTIVITAMIN ADULT PO) Take  by mouth.   Taking   • nystatin (MYCOSTATIN) 086901 UNIT/GM ointment Apply  topically Every 12 (Twelve) Hours. 30 g 0 Taking   • oxyCODONE-acetaminophen (PERCOCET)  MG per tablet 1 by mouth every 4-6 hours as needed for pain 18 tablet 0    • tamsulosin (FLOMAX) 0.4 MG capsule 24 hr capsule Take 1 capsule by mouth Daily. 90 capsule 1 Taking   • vitamin C (VITAMIN C) 500 MG tablet Take 1 tablet by mouth Daily. 30 tablet 0 Taking   • XARELTO 20 MG tablet Take 1 tablet by mouth Daily. 90 tablet 1 Taking     Allergies:  Allergies   Allergen Reactions   • Penicillins Hives     Objective     Physical Exam:  Vital Signs: /88 (BP Location: Left arm,  "Patient Position: Lying)  Pulse 72  Temp 97.3 °F (36.3 °C) (Oral)   Resp 18  Ht 74\" (188 cm)  Wt 263 lb 11.2 oz (120 kg)  SpO2 95%  BMI 33.86 kg/m2  Physical Exam   Constitutional: He is oriented to person, place, and time. He appears well-developed and well-nourished. He is cooperative. He is easily aroused.   Obese,  male. No apparent distress noted.   HENT:   Head: Normocephalic and atraumatic.   Eyes: EOM are normal. Pupils are equal, round, and reactive to light. No scleral icterus.   Neck: Normal range of motion. Neck supple. No thyromegaly present.   Cardiovascular:   Pulses:       Radial pulses are 2+ on the right side, and 2+ on the left side.        Dorsalis pedis pulses are 2+ on the right side, and 2+ on the left side.        Posterior tibial pulses are 2+ on the right side, and 2+ on the left side.   Pulmonary/Chest: Effort normal and breath sounds normal. No respiratory distress. He has no wheezes. He has no rales. He exhibits no tenderness.   Abdominal: Soft. Bowel sounds are normal. He exhibits no distension and no mass. There is no tenderness. There is no rebound and no guarding. No hernia.   Abdomen round, obese, soft, and non tender.   Musculoskeletal: He exhibits edema and tenderness.   Neurological: He is alert, oriented to person, place, and time and easily aroused. He exhibits abnormal muscle tone. GCS eye subscore is 4. GCS verbal subscore is 5. GCS motor subscore is 6.   Decreased ROM in bilateral lower extremities. Pt is able to slightly move lower extremities against gravity, right > left. Sensation noted in BLE.    Skin: Skin is warm and dry.   Psychiatric: He has a normal mood and affect. His speech is normal and behavior is normal. Judgment and thought content normal. Cognition and memory are normal.   Vitals reviewed.    Results Reviewed:  Lab Results (last 24 hours)     Procedure Component Value Units Date/Time    CBC Auto Differential [[id number]]  (Abnormal) " Collected:  08/19/17 2221    Specimen:  Blood Updated:  08/19/17 2248     WBC 6.37 10*3/mm3      RBC 5.21 10*6/mm3      Hemoglobin 13.1 g/dL      Hematocrit 41.6 %      MCV 79.8 (L) fL      MCH 25.1 (L) pg      MCHC 31.5 (L) g/dL      RDW 21.3 (H) %      RDW-SD 62.3 (H) fl      MPV 9.4 fL      Platelets 161 10*3/mm3      Neutrophil % 67.5 %      Lymphocyte % 17.6 (L) %      Monocyte % 11.9 %      Eosinophil % 2.5 %      Basophil % 0.3 %      Immature Grans % 0.2 %      Neutrophils, Absolute 4.30 10*3/mm3      Lymphocytes, Absolute 1.12 10*3/mm3      Monocytes, Absolute 0.76 10*3/mm3      Eosinophils, Absolute 0.16 10*3/mm3      Basophils, Absolute 0.02 10*3/mm3      Immature Grans, Absolute 0.01 10*3/mm3     Comprehensive Metabolic Panel [[id number]]  (Abnormal) Collected:  08/19/17 2221    Specimen:  Blood Updated:  08/19/17 2305     Glucose 130 (H) mg/dL      BUN 25 (H) mg/dL      Creatinine 0.70 mg/dL      Sodium 138 mmol/L      Potassium 4.2 mmol/L      Chloride 106 mmol/L      CO2 32.0 (H) mmol/L      Calcium 8.4 (L) mg/dL      Total Protein 5.9 g/dL      Albumin 2.90 (L) g/dL      ALT (SGPT) 18 U/L      AST (SGOT) 23 U/L      Alkaline Phosphatase 106 (H) U/L      Total Bilirubin 0.4 mg/dL      eGFR Non African Amer 107 mL/min/1.73      Globulin 3.0 gm/dL      A/G Ratio 1.0 (L) g/dL      BUN/Creatinine Ratio 35.7 (H)     Anion Gap 0.0 (L) mmol/L     Narrative:       National Kidney Foundation Guidelines    Stage     Description        GFR  1         Normal or High     90+  2         Mild decrease      60-89  3         Moderate decrease  30-59  4         Severe decrease    15-29  5         Kidney failure     <15    Urine Culture [[id number]] Collected:  08/19/17 2243    Specimen:  Urine from Urine, Clean Catch Updated:  08/19/17 2312    Urinalysis With / Culture If Indicated [[id number]]  (Abnormal) Collected:  08/19/17 2248    Specimen:  Urine from Urine, Clean Catch Updated:  08/19/17 8010     Color, UA Red (A)       Corrected result. Previous result was Marylin on 8/19/2017 at 2318 EDT        Appearance, UA Cloudy (A)      Corrected result. Previous result was Clear on 8/19/2017 at 2318 EDT        pH, UA <=5.0     Specific Gravity, UA 1.020     Glucose, UA Negative     Ketones, UA Trace (A)     Bilirubin, UA Small (1+) (A)     Blood, UA Large (3+) (A)     Protein, UA >=300 mg/dL (3+) (A)     Leuk Esterase, UA Moderate (2+) (A)     Nitrite, UA Positive (A)     Urobilinogen, UA 2.0 E.U./dL (A)    Urinalysis, Microscopic Only [[id number]]  (Abnormal) Collected:  08/19/17 2243    Specimen:  Urine from Urine, Clean Catch Updated:  08/19/17 2337     RBC, UA Too Numerous to Count (A) /HPF      WBC, UA 13-20 (A) /HPF      Bacteria, UA Trace /HPF      Squamous Epithelial Cells, UA 0-2 /HPF      Hyaline Casts, UA 0-6 /LPF      Methodology Manual Light Microscopy        I have personally reviewed and interpreted available lab data, radiology studies and ECG obtained at time of admission.     Assessment / Plan     Problem List:   Hospital Problem List     * (Principal)Urinary tract infection associated with indwelling urethral catheter    Chronic a-fib    Lower paraplegia    Overview Signed 8/1/2017  1:41 PM by Chelsie Cardozo MD     From spinal stenosis         Hematuria    Chronic indwelling Oneal catheter    Leakage from urinary catheter        Assessment/Plan:  Urinary tract infection associated with indwelling urethral catheter vs recurrent bacterial colonization with resistant organism  -Oneal catheter replaced in the ED today, hematuria present, UA sent, diagnosed with UTI, given IV Cefepime  -will continue IV Cefepime 1gm Q8H; consult ID in the AM for recurrent UTI vs bacterial colonization  -strict I&O's, daily weights, follow urine culture; routine AM labs  -consult CM in the AM for discharge planning; pt will need outpatient f/u appt scheduled again with  urology  -consult PT/OT in the AM to assess mobility    DVT  prophylaxis:  -TEDs and SCDs    Code Status:   -DNR    Admission Status: Patient will be admitted to (LEXOBS or LEXINPT)     BAL Tai 08/20/17 5:21 AM      PHYSICIAN NOTE(ADDENDUM)    84-year-old male presented to the ER with the chief complaint of urine leaking around his Owusu catheter with some blood.    Patient has chronic indwelling Owusu catheter since February 2016 most likely secondary to incontinent/neurogenic bladder along with his enlarged prostate.  His Owusu catheter has been replaced every 4 weeks.  He was recently admitted with us in July with possible asymptomatic pseudomonas colonization vs infection-Rx with cefepime, and was suppose to fup with  urology.    Today after replacing owusu, UA  Was positive- but no wbc and no fever, er attending felt he should be Rx as this could be real infection, started on cefepime, and admitted for possible id input.    On exam no ABD pain, mild wheezing, co of dry cough-will order chest x ray prn nebs.    Pt chronically disabled and cannot walk 2nd to lower ext weakness from his herniated lumbar disc.    Chronic Afib-on lopressor and xarelto-will hold blood thinner 2nd to hematuria and monitor cbc closely.    Rest a/p as per Nps note.      I have independently seen and examined the patient with ARNP and the note above reflects my changes and contributions. I have discussed with findings, diagnosis and plans with the patient and family.     Sonia Plata MD 08/20/17 7:33 AM              Electronically signed by Sonia Plata MD at 8/20/2017  7:33 AM        Vital Signs (last 72 hrs)       08/18 0700  -  08/19 0659 08/19 0700  -  08/20 0659 08/20 0700  -  08/21 0659 08/21 0700  -  08/21 1019   Most Recent    Temp (°F)   97.3 -  98    96.1 -  97.1      98.6     98.6 (37)    Heart Rate   70 -  91    (!)48 -  63      60     60    Resp     18      18       18    BP   151/94 -  172/88    129/90 -  157/96      165/98     165/98    SpO2 (%)   92 -  97     "97 -  98       98          Hospital Medications (all)       Dose Frequency Start End    acetaminophen (TYLENOL) tablet 650 mg 650 mg Every 4 Hours PRN 8/20/2017     Sig - Route: Take 2 tablets by mouth Every 4 (Four) Hours As Needed for Mild Pain . - Oral    calcium carbonate (TUMS) chewable tablet 500 mg (200 mg elemental) 1 tablet Daily 8/20/2017     Sig - Route: Chew 500 mg Daily. - Oral    cefepime (MAXIPIME) 1 g/100 mL 0.9% NS IVPB (mbp) 1 g Every 8 Hours Scheduled 8/20/2017     Sig - Route: Infuse 100 mL into a venous catheter Every 8 (Eight) Hours. - Intravenous    cholecalciferol (VITAMIN D3) tablet 1,000 Units 1,000 Units 2 Times Daily 8/20/2017     Sig - Route: Take 1 tablet by mouth 2 (Two) Times a Day. - Oral    docusate sodium (COLACE) capsule 100 mg 100 mg 2 Times Daily PRN 8/20/2017     Sig - Route: Take 1 capsule by mouth 2 (Two) Times a Day As Needed for Constipation. - Oral    famotidine (PEPCID) tablet 20 mg 20 mg Daily 8/20/2017     Sig - Route: Take 1 tablet by mouth Daily. - Oral    finasteride (PROSCAR) tablet 5 mg 5 mg Daily 8/20/2017     Sig - Route: Take 1 tablet by mouth Daily. - Oral    gabapentin (NEURONTIN) capsule 400 mg 400 mg 3 Times Daily 8/20/2017     Sig - Route: Take 1 capsule by mouth 3 (Three) Times a Day. - Oral    metoprolol tartrate (LOPRESSOR) tablet 50 mg 50 mg Every 12 Hours Scheduled 8/20/2017     Sig - Route: Take 1 tablet by mouth Every 12 (Twelve) Hours. - Oral    multivitamin with minerals 1 tablet 1 tablet Daily 8/20/2017     Sig - Route: Take 1 tablet by mouth Daily. - Oral    ondansetron (ZOFRAN) injection 4 mg 4 mg Every 6 Hours PRN 8/20/2017     Sig - Route: Infuse 2 mL into a venous catheter Every 6 (Six) Hours As Needed for Nausea or Vomiting. - Intravenous    Linked Group 1:  \"Or\" Linked Group Details        ondansetron (ZOFRAN) tablet 4 mg 4 mg Every 6 Hours PRN 8/20/2017     Sig - Route: Take 1 tablet by mouth Every 6 (Six) Hours As Needed for Nausea or " "Vomiting. - Oral    Linked Group 1:  \"Or\" Linked Group Details        sodium chloride 0.9 % flush 1-10 mL 1-10 mL As Needed 8/20/2017     Sig - Route: Infuse 1-10 mL into a venous catheter As Needed for Line Care. - Intravenous    sodium chloride 0.9 % infusion 100 mL/hr Continuous 8/20/2017     Sig - Route: Infuse 100 mL/hr into a venous catheter Continuous. - Intravenous    tamsulosin (FLOMAX) 24 hr capsule 0.4 mg 0.4 mg Nightly 8/20/2017     Sig - Route: Take 1 capsule by mouth Every Night. - Oral    vitamin C (ASCORBIC ACID) tablet 500 mg 500 mg Daily 8/20/2017     Sig - Route: Take 1 tablet by mouth Daily. - Oral          Lab Results (last 72 hours)     Procedure Component Value Units Date/Time    CBC Auto Differential [[id number]]  (Abnormal) Collected:  08/19/17 2221    Specimen:  Blood Updated:  08/19/17 2248     WBC 6.37 10*3/mm3      RBC 5.21 10*6/mm3      Hemoglobin 13.1 g/dL      Hematocrit 41.6 %      MCV 79.8 (L) fL      MCH 25.1 (L) pg      MCHC 31.5 (L) g/dL      RDW 21.3 (H) %      RDW-SD 62.3 (H) fl      MPV 9.4 fL      Platelets 161 10*3/mm3      Neutrophil % 67.5 %      Lymphocyte % 17.6 (L) %      Monocyte % 11.9 %      Eosinophil % 2.5 %      Basophil % 0.3 %      Immature Grans % 0.2 %      Neutrophils, Absolute 4.30 10*3/mm3      Lymphocytes, Absolute 1.12 10*3/mm3      Monocytes, Absolute 0.76 10*3/mm3      Eosinophils, Absolute 0.16 10*3/mm3      Basophils, Absolute 0.02 10*3/mm3      Immature Grans, Absolute 0.01 10*3/mm3     Comprehensive Metabolic Panel [[id number]]  (Abnormal) Collected:  08/19/17 2221    Specimen:  Blood Updated:  08/19/17 2305     Glucose 130 (H) mg/dL      BUN 25 (H) mg/dL      Creatinine 0.70 mg/dL      Sodium 138 mmol/L      Potassium 4.2 mmol/L      Chloride 106 mmol/L      CO2 32.0 (H) mmol/L      Calcium 8.4 (L) mg/dL      Total Protein 5.9 g/dL      Albumin 2.90 (L) g/dL      ALT (SGPT) 18 U/L      AST (SGOT) 23 U/L      Alkaline Phosphatase 106 (H) U/L      " Total Bilirubin 0.4 mg/dL      eGFR Non African Amer 107 mL/min/1.73      Globulin 3.0 gm/dL      A/G Ratio 1.0 (L) g/dL      BUN/Creatinine Ratio 35.7 (H)     Anion Gap 0.0 (L) mmol/L     Narrative:       National Kidney Foundation Guidelines    Stage     Description        GFR  1         Normal or High     90+  2         Mild decrease      60-89  3         Moderate decrease  30-59  4         Severe decrease    15-29  5         Kidney failure     <15    Urinalysis With / Culture If Indicated [[id number]]  (Abnormal) Collected:  08/19/17 2243    Specimen:  Urine from Urine, Clean Catch Updated:  08/19/17 2326     Color, UA Red (A)      Corrected result. Previous result was Marylin on 8/19/2017 at 2318 EDT        Appearance, UA Cloudy (A)      Corrected result. Previous result was Clear on 8/19/2017 at 2318 EDT        pH, UA <=5.0     Specific Gravity, UA 1.020     Glucose, UA Negative     Ketones, UA Trace (A)     Bilirubin, UA Small (1+) (A)     Blood, UA Large (3+) (A)     Protein, UA >=300 mg/dL (3+) (A)     Leuk Esterase, UA Moderate (2+) (A)     Nitrite, UA Positive (A)     Urobilinogen, UA 2.0 E.U./dL (A)    Urinalysis, Microscopic Only [[id number]]  (Abnormal) Collected:  08/19/17 2243    Specimen:  Urine from Urine, Clean Catch Updated:  08/19/17 2337     RBC, UA Too Numerous to Count (A) /HPF      WBC, UA 13-20 (A) /HPF      Bacteria, UA Trace /HPF      Squamous Epithelial Cells, UA 0-2 /HPF      Hyaline Casts, UA 0-6 /LPF      Methodology Manual Light Microscopy    CBC & Differential [[id number]] Collected:  08/20/17 0911    Specimen:  Blood Updated:  08/20/17 0936    Narrative:       The following orders were created for panel order CBC & Differential.  Procedure                               Abnormality         Status                     ---------                               -----------         ------                     CBC Auto Differential[[id number]]        Abnormal            Final result                  Please view results for these tests on the individual orders.    CBC Auto Differential [[id number]]  (Abnormal) Collected:  08/20/17 0911    Specimen:  Blood Updated:  08/20/17 0936     WBC 5.24 10*3/mm3      RBC 4.92 10*6/mm3      Hemoglobin 12.4 (L) g/dL      Hematocrit 39.6 %      MCV 80.5 fL      MCH 25.2 (L) pg      MCHC 31.3 (L) g/dL      RDW 21.7 (H) %      RDW-SD 63.5 (H) fl      MPV 9.1 fL      Platelets 144 (L) 10*3/mm3      Neutrophil % 58.9 %      Lymphocyte % 25.0 %      Monocyte % 12.2 (H) %      Eosinophil % 3.1 (H) %      Basophil % 0.4 %      Immature Grans % 0.4 %      Neutrophils, Absolute 3.09 10*3/mm3      Lymphocytes, Absolute 1.31 10*3/mm3      Monocytes, Absolute 0.64 10*3/mm3      Eosinophils, Absolute 0.16 10*3/mm3      Basophils, Absolute 0.02 10*3/mm3      Immature Grans, Absolute 0.02 10*3/mm3     Basic Metabolic Panel [[id number]]  (Abnormal) Collected:  08/20/17 0911    Specimen:  Blood Updated:  08/20/17 0949     Glucose 136 (H) mg/dL      BUN 21 mg/dL      Creatinine 0.60 mg/dL      Sodium 137 mmol/L      Potassium 3.8 mmol/L      Chloride 105 mmol/L      CO2 30.0 mmol/L      Calcium 8.2 (L) mg/dL      eGFR Non African Amer 128 mL/min/1.73      BUN/Creatinine Ratio 35.0 (H)     Anion Gap 2.0 (L) mmol/L     Narrative:       National Kidney Foundation Guidelines    Stage     Description        GFR  1         Normal or High     90+  2         Mild decrease      60-89  3         Moderate decrease  30-59  4         Severe decrease    15-29  5         Kidney failure     <15    Urine Culture [[id number]]  (Normal) Collected:  08/19/17 2244    Specimen:  Urine from Urine, Clean Catch Updated:  08/21/17 07     Urine Culture Culture in progress          Imaging Results (last 72 hours)     ** No results found for the last 72 hours. **           Physician Progress Notes (last 7 days) (Notes from 8/14/2017 10:19 AM through 8/21/2017 10:19 AM)      Murphy Correa MD at 8/20/2017  1:38 PM   Version 1 of 1         Please see the history and physical dated today for full details.  Briefly is a chronically ill 84-year-old gentleman with a chronic Oneal catheter.  Recently discharged earlier this month after growing Pseudomonas from urine specimen.  Was seen by infectious disease and treated with cefepime.  Comes back to the emergency room after having some catheter difficulties including leakage and discomfort.  Catheter was replaced in the emergency room and apparently the ER physician was concerned about infection and so was admitted.  No known fevers, no leukocytosis.  Urine culture is pending.  He is our to been seen by Dr. Cobb from infectious disease who recommends continuing cefepime for now and will discuss with Dr. Lockett tomorrow.  I suspect this is related to colonization rather than true infection.  Possible discharge home tomorrow to follow-up with  urology.    Murphy Correa MD       Electronically signed by Murphy Correa MD at 8/20/2017  1:40 PM           Consult Notes (last 72 hours) (Notes from 8/18/2017 10:19 AM through 8/21/2017 10:19 AM)      Anthony Jackson MD at 8/20/2017 11:43 AM  Version 1 of 1     Consult Orders:    1. Inpatient Consult to Infectious Diseases [[id number]] ordered by BAL Beckett at 08/20/17 0512                Calderon Scott  11/25/1932  [id number]    Date of Consult: 8/20/2017 8/19/2017      Requesting Provider: No ref. provider found  Evaluating Physician: Anthony Jackson MD    Chief Complaint: Dysuria and Oneal catheter dysfunction and abnormal urinalysis    Reason for Consultation: As above    History of present illness:    Patient is a 84 y.o.  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  He was seen by Dr. Wolf with last inpatient visit approximately August 1 associated with abnormal urine culture from July 28 with pseudomonas aeruginosa.  He  received some cefepime but unclear exact duration.  He reports having Oneal catheter changed by his home health nurse on August 19.  He cannot recall the exact procedure but felt as if the catheter was tugged on, did not function appropriately and he subsequently had urination around the catheter associated with dysuria.  He came to the emergency room at [facility] with a Oneal catheter was replaced again.  Urinalysis was abnormal and cefepime started empirically.    He reports edema at his penis/scrotum.  He has no lower abdominal pain.  No hematuria or pyuria and no flank pain.  Dysuria symptom has improved.  Urine cultures pending.    No headache photophobia or neck stiffness.  No shortness of breath cough or hemoptysis.  No nausea vomiting diarrhea or abdominal pain.  He denies overt fevers or shaking chills.  No sweats.    Past Medical History:   Diagnosis Date   • A-fib    • Arthritis    • Back disorder     patient states he cannot walk or stand due to back pain since february 2017   • Cancer    • CHF (congestive heart failure)    • Chronic indwelling Oneal catheter 8/20/2017   • Deep vein thrombosis    • Fracture     left wrist, left hand > 10 years ago    • Hypertension        Past Surgical History:   Procedure Laterality Date   • KNEE SURGERY     • SKIN BIOPSY     • SKIN CANCER EXCISION      left ear removed, multiple areas removed    • SKIN SURGERY      basal cell and melanoma   • VASECTOMY         Pediatric History   Patient Guardian Status   • Not on file.     Other Topics Concern   • Not on file     Social History Narrative   He denies tobacco alcohol or illicit drugs    family history includes Arthritis in his sister; Cancer in his father.    Allergies   Allergen Reactions   • Penicillins Hives       Medication:  Current Facility-Administered Medications   Medication Dose Route Frequency Provider Last Rate Last Dose   • acetaminophen (TYLENOL) tablet 650 mg  650 mg Oral Q4H PRN Reba Soria, BAL   650  mg at 08/20/17 1058   • calcium carbonate (TUMS) chewable tablet 500 mg (200 mg elemental)  1 tablet Oral Daily Sonia Plata MD   1 tablet at 08/20/17 1101   • cefepime (MAXIPIME) 1 g/100 mL 0.9% NS IVPB (mbp)  1 g Intravenous Q8H BAL Beckett   1 g at 08/20/17 0622   • cholecalciferol (VITAMIN D3) tablet 1,000 Units  1,000 Units Oral BID Sonia Plata MD   1,000 Units at 08/20/17 0855   • docusate sodium (COLACE) capsule 100 mg  100 mg Oral BID PRN BAL Beckett       • famotidine (PEPCID) tablet 20 mg  20 mg Oral Daily Sonia Plata MD   20 mg at 08/20/17 0856   • finasteride (PROSCAR) tablet 5 mg  5 mg Oral Daily Sonia Plata MD   5 mg at 08/20/17 0901   • gabapentin (NEURONTIN) capsule 400 mg  400 mg Oral TID Sonia Plata MD   400 mg at 08/20/17 0855   • metoprolol tartrate (LOPRESSOR) tablet 50 mg  50 mg Oral Q12H Sonia Plata MD   50 mg at 08/20/17 0856   • multivitamin with minerals 1 tablet  1 tablet Oral Daily Sonia Plata MD   1 tablet at 08/20/17 0854   • ondansetron (ZOFRAN) tablet 4 mg  4 mg Oral Q6H PRN BAL Beckett        Or   • ondansetron (ZOFRAN) injection 4 mg  4 mg Intravenous Q6H PRN BAL Beckett       • sodium chloride 0.9 % flush 1-10 mL  1-10 mL Intravenous PRN BAL Beckett       • sodium chloride 0.9 % infusion  100 mL/hr Intravenous Continuous BAL Beckett 100 mL/hr at 08/20/17 0622 100 mL/hr at 08/20/17 0622   • tamsulosin (FLOMAX) 24 hr capsule 0.4 mg  0.4 mg Oral Nightly Sonia Plata MD       • vitamin C (ASCORBIC ACID) tablet 500 mg  500 mg Oral Daily oSnia Plata MD   500 mg at 08/20/17 0856       Antibiotics:  IV Anti-Infectives     Ordered     Dose/Rate Route Frequency Start Stop    08/20/17 0421  cefepime (MAXIPIME) 1 g/100 mL 0.9% NS IVPB (mbp)     Ordering Provider:  BAL Beckett    1 g Intravenous Every 8 Hours Scheduled 08/20/17 0500              Review of Systems    Constitutional-- No Fever, chills  "or sweats.  Appetite good, and no malaise. Generally he is fatigued  Heent-- No new vision, hearing or throat complaints.  No epistaxis or oral sores.  Denies odynophagia or dysphagia.  No flashers, floaters or eye pain. No odynophagia or dysphagia. No headache, photophobia or neck stiffness.  CV-- No chest pain, palpitation or syncope  Resp-- No SOB/cough/Hemoptysis  GI- No nausea, vomiting, or diarrhea.  No hematochezia, melena, or hematemesis. Denies jaundice or chronic liver disease.  -- as above  Lymph- no swollen lymph nodes in neck/axilla or groin.   Heme- No active bruising or bleeding; no Hx of DVT or PE.  MS-- no acute swelling or pain in the bones or joints of arms/legs.  No new back pain.  Neuro-- chronically debilitated and nonambulatory    Full 12 point review of systems reviewed and negative otherwise for acute complaints, except for above    Physical Exam:   Vital Signs   /88 (BP Location: Right arm, Patient Position: Lying)  Pulse 63  Temp 96.1 °F (35.6 °C) (Oral)   Resp 18  Ht 74\" (188 cm)  Wt 263 lb 11.2 oz (120 kg)  SpO2 98%  BMI 33.86 kg/m2    GENERAL: Awake and alert, in no acute distress.  Chronically ill appearing  HEENT: Normocephalic, atraumatic.  PERRL. EOMI. No conjunctival injection. No icterus. Oropharynx clear without evidence of thrush or exudate. No evidence of peridontal disease.    NECK: Supple without nuchal rigidity. No mass.  LYMPH: No cervical, axillary or inguinal lymphadenopathy.  HEART: RRR; No murmur, rubs, gallops.   LUNGS: Clear to auscultation bilaterally without wheezing, rales, rhonchi. Normal respiratory effort. Nonlabored. No dullness.  ABDOMEN: Soft, nontender, nondistended. Positive bowel sounds. No rebound or guarding. NO mass or HSM.  EXT:  No cyanosis, clubbing. No cord.  He has edema at both legs  : Genitalia with vague edema at penis/scrotum but no overt redness induration or warmth.  No mass bulge or fluctuance.  No crepitus or bulla.  " Indwelling Oneal catheter.  MSK: FROM without joint effusions noted arms/legs.    SKIN: Warm and dry without cutaneous eruptions on Inspection/palpation.    NEURO: Generally debilitated and he has difficulty cooperating with a motor/sensory exam with positioning in bed  PSYCHIATRIC: Normal insight and judgement. Cooperative with PE    Laboratory Data      Results from last 7 days  Lab Units 08/20/17  0911 08/19/17  2221   WBC 10*3/mm3 5.24 6.37   HEMOGLOBIN g/dL 12.4* 13.1   HEMATOCRIT % 39.6 41.6   PLATELETS 10*3/mm3 144* 161       Results from last 7 days  Lab Units 08/20/17  0911   SODIUM mmol/L 137   POTASSIUM mmol/L 3.8   CHLORIDE mmol/L 105   CO2 mmol/L 30.0   BUN mg/dL 21   CREATININE mg/dL 0.60   GLUCOSE mg/dL 136*   CALCIUM mg/dL 8.2*       Results from last 7 days  Lab Units 08/19/17  2221   ALK PHOS U/L 106*   BILIRUBIN mg/dL 0.4   ALT (SGPT) U/L 18   AST (SGOT) U/L 23               Estimated Creatinine Clearance: 94.6 mL/min (by C-G formula based on Cr of 0.6).      Microbiology:      Radiology:  Imaging Results (last 72 hours)     ** No results found for the last 72 hours. **            Impression:   --Acute dysuria associated with Oneal catheter dysfunction, possible obstructed Oneal catheter given his description of urine coming out around the catheter at home.  Oneal catheter changed again and appears to be functioning well according to nursing.  Further compounded by penile/scrotal edema but no obvious cellulitis.  Urine is abnormal but difficult to interpret in the setting of chronic Oneal catheter.  Urine culture pending.  I suspect the primary problem here was Oneal catheter dysfunction/obstruction and not a surprise he had some dysuria associated with that; and,  without constitutional symptoms and without leukocytosis it is difficult to know the significance of his abnormal urinalysis in the setting of a chronic Oneal catheter (as chronic bacterial colonization and chronic pyruria/bacteria on  U/A not a surprise with chronic owusu) and difficult to make a definitive diagnosis of UTI at present.  Further decisions to depend on culture data and clinical course.    --Chronic Owusu catheter    --Penile/scrotal edema.  Chronicity unclear.  Nursing attempting general supportive measures    PLAN: Thank you for asking us to see Calderon Scott, I recommend the following:     --IV cefepime for now but may not require lengthy treatment course depending on clinical course/study results/culture data, etc.    --I discussed potential risks and benefits of the prescribed antibiotics that include, but are not limited to, solid organ toxicity, neuro toxicity, renal toxicity, CDiff, cytopenias, hypersensitivity,  etc.. Patient voices understanding and agree to proceed.    --Check/review labs cultures and scans    --Discussed with nursing.  Partial history per them    --Discussed with microbiology.    --Highly complex chronic issues with high risk for further serious morbidity and other serious sequela    --Dr. Wolf back on Monday to resume care    Anthony Jackson MD  8/20/2017                 Electronically signed by Anthony Jackson MD at 8/20/2017 11:55 AM

## 2017-08-21 NOTE — DISCHARGE SUMMARY
[facility] Medicine Services  DISCHARGE SUMMARY       Date of Admission: 8/19/2017  Date of Discharge:  8/21/2017  Primary Care Physician: BAL Farmer  Consulting Physician(s)     Provider Relationship    Jacky Wolf MD Consulting Physician    Anthony Jackson MD Consulting Physician          Discharge Diagnoses:  Active Hospital Problems (** Indicates Principal Problem)    Diagnosis Date Noted   • **Urinary tract infection associated with indwelling urethral catheter [T83.511A, N39.0] 08/20/2017   • Hematuria [R31.9] 08/20/2017   • Chronic indwelling Owusu catheter [Z92.89] 08/20/2017   • Leakage from urinary catheter [T83.038A] 08/20/2017   • Lower paraplegia [G82.20] 08/01/2017     From spinal stenosis     • Chronic a-fib [I48.2] 05/18/2017      Resolved Hospital Problems    Diagnosis Date Noted Date Resolved   No resolved problems to display.       Presenting Problem/History of Present Illness  Urinary tract infection associated with indwelling urethral catheter, initial encounter [T83.511A, N39.0]     History of Present Illness on Day of Discharge: Patient is resting in bed in NAD. He has no urinary complaints at this time. He is tolerating diet. He denies any soa, cp, fever, chills or n/v/d.   Hospital Course  Patient is a 84 y.o. 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 He has chronic indwelling Owusu catheter that is replaced every 4 weeks. Patient presented to [facility] ED with complaints of urine leakage from his owusu catheter. He had it replaced by home health and since then he has had pain and urinary leakage. In ED UA showed blood, mod leuks, + nitrites and trace bacteria.     Patient was admitted to hospital medicine and Infectious diease was consulted.  "Patient new  owusu catheter was removed due to urinary leakage. Patient has been without a owusu and has been voiding without complaint. He is incontinent of urine. Bladder scan showed only PVR of 100 ml. We will leave owusu catheter out and have patient follow up with [facility].      Infectious diease recommends no antibiotics at this time and to leave owusu catheter out since patient is voiding without difficulty and no retention.     Patient will be discharged today with home health. and follow up with PCP in one week. Follow up with [facility] at first available. Family needs three day advanced notice for appt to get medical van. Talked with Granddaughter who is primary caregiver of patient and informed her of the medical plan. Talked with her about skin care and incontinence care. She va about care and follow up appt.       Procedures Performed         Consults:   Consults     Date and Time Order Name Status Description    8/20/2017 0512 Inpatient Consult to Infectious Diseases Completed           Pertinent Test Results:  Lab Results (last 24 hours)     Procedure Component Value Units Date/Time    Urine Culture [[id number]]  (Normal) Collected:  08/19/17 2243    Specimen:  Urine from Urine, Clean Catch Updated:  08/21/17 0725     Urine Culture Culture in progress        Imaging Results (last 24 hours)     ** No results found for the last 24 hours. **          Condition on Discharge:  Stable     Physical Exam on Discharge:/98 (BP Location: Left arm, Patient Position: Lying)  Pulse 66  Temp 98.6 °F (37 °C) (Oral)   Resp 18  Ht 74\" (188 cm)  Wt 269 lb 12.8 oz (122 kg)  SpO2 98%  BMI 34.64 kg/m2  Physical Exam   Constitutional: He is oriented to person, place, and time. He appears well-developed and well-nourished. No distress.   HENT:   Head: Normocephalic.   Eyes: Pupils are equal, round, and reactive to light.   Neck: Normal range of motion.   Cardiovascular: Normal rate, regular rhythm, normal " heart sounds and intact distal pulses.  Exam reveals no friction rub.    No murmur heard.  Pulmonary/Chest: Effort normal. No stridor. No respiratory distress. He has wheezes. He has no rales.   Pt had some scattered wheezes in kashif upper lobes    Abdominal: Soft. Bowel sounds are normal. He exhibits no distension. There is no tenderness.   Musculoskeletal: He exhibits edema.   Pt has 2 + pitting in BLE. Mod edema in left arm chronic. Pt has limited rom in ble    Neurological: He is alert and oriented to person, place, and time.   Skin: Skin is warm and dry. He is not diaphoretic.   Pt has chronic wound on right ankle coccyx red but blanchable   Psychiatric: He has a normal mood and affect. His behavior is normal. Judgment and thought content normal.   Vitals reviewed.        Discharge Disposition      Discharge Medications   Calderon Scott   Home Medication Instructions DAR:[id number]    Printed on:08/21/17 0387   Medication Information                      calcium carbonate (TUMS) 500 MG chewable tablet  Chew 1 tablet Daily.             cholecalciferol (VITAMIN D3) 1000 UNITS tablet  Take 1 tablet by mouth 2 (Two) Times a Day.             Digestive Enzymes (ACIDOLL) capsule  Take  by mouth.             famotidine (PEPCID) 20 MG tablet  Take 1 tablet by mouth Daily.             finasteride (PROSCAR) 5 MG tablet  Take 1 tablet by mouth Daily.             gabapentin (NEURONTIN) 400 MG capsule  Take 1 capsule by mouth 3 (Three) Times a Day.             Glucosamine-Chondroit-Vit C-Mn (GLUCOSAMINE CHONDR 1500 COMPLX PO)  Take  by mouth.             methenamine (HIPREX) 1 G tablet  Take 1 tablet by mouth 2 (Two) Times a Day.             metoprolol tartrate (LOPRESSOR) 50 MG tablet  Take 1 tablet by mouth 2 (Two) Times a Day.             Multiple Vitamins-Minerals (MULTIVITAMIN ADULT PO)  Take  by mouth.             nystatin (MYCOSTATIN) 691224 UNIT/GM ointment  Apply  topically Every 12 (Twelve) Hours.              oxyCODONE-acetaminophen (PERCOCET)  MG per tablet  1 by mouth every 4-6 hours as needed for pain             tamsulosin (FLOMAX) 0.4 MG capsule 24 hr capsule  Take 1 capsule by mouth Daily.             vitamin C (VITAMIN C) 500 MG tablet  Take 1 tablet by mouth Daily.             XARELTO 20 MG tablet  Take 1 tablet by mouth Daily.                 Discharge Diet:   Diet Instructions     Diet: Cardiac; Thin       Discharge Diet:  Cardiac   Fluid Consistency:  Thin                 Discharge Care Plan / Instructions:    Activity at Discharge:   Activity Instructions     Activity as Tolerated                     Follow-up Appointments  No future appointments.  Additional Instructions for the Follow-ups that You Need to Schedule     Discharge Follow-Up With Specified Provider    As directed    To:   urology   Follow Up Details:  first available       Discharge Follow-up with PCP    As directed    Follow Up Details:  PCP in one week                 Test Results Pending at Discharge   Order Current Status    Urine Culture Preliminary result           BAL Morrow 08/21/17 2:22 PM    Time: Discharge 45 min    Please note that portions of this note may have been completed with a voice recognition program. Efforts were made to edit the dictations, but occasionally words are mistranscribed.

## 2017-08-21 NOTE — DISCHARGE PLACEMENT REQUEST
"Calderon Mckoen (84 y.o. Male)     To: [facility]    From: Ashley,   [phone]      PATIENT IS DISCHARGING HOME TODAY AT 1800.       Date of Birth Social Security Number Address Home Phone MRN    1932  [address] [address] [phone] [id number]    Orthodox Marital Status          None        Admission Date Admission Type Admitting Provider Attending Provider Department, Room/Bed    17 Emergency Murphy Correa MD Dossett, Lee M, MD [facility] 3E, S331/1    Discharge Date Discharge Disposition Discharge Destination                      Attending Provider: Murphy Correa MD     Allergies:  Penicillins    Isolation:  Contact   Infection:  MDR Pseudomonas (17)   Code Status:  Conditional    Ht:  74\" (188 cm)   Wt:  269 lb 12.8 oz (122 kg)    Admission Cmt:  None   Principal Problem:  Urinary tract infection associated with indwelling urethral catheter [T83.511A,N39.0]                 Active Insurance as of 2017     Primary Coverage     Payor Plan Insurance Group Employer/Plan Group    ANTHEM MEDICARE REPLACEMENT ANTHEM MEDICARE ADVANTAGE [id number]     Payor Plan Address Payor Plan Phone Number Effective From Effective To    [address] [phone] 2017     [place] [address]       Subscriber Name Subscriber Birth Date Member ID       CALEDRON MCKEON 1932 [id number]                 Emergency Contacts      (Rel.) Home Phone Work Phone Mobile Phone    TylerRoderick (Son) [phone] -- --          [address] [address]  Phone:  [phone]  Fax:  [phone]        Patient:     Calderon Mckeon MRN:  [id number]   [address] [facility] [address] :  1932  SSN:    Phone: [phone] Sex:  M      INSURANCE PAYOR PLAN GROUP # SUBSCRIBER ID   Primary: ANTHEM MEDICARE REPLACEMENT [id number] "   Admitting Diagnosis: Urinary tract infection associated with indwelling urethral catheter, initial encounter [T83.511A, N39.0]  Order Date:  Aug 21, 2017               Inpatient Consult to Case Management        (Order ID: [id number])     Diagnosis:         Priority:  Routine Expected Date:   Expiration Date:        Interval:   Count:    Reason for Consult? Resume home heatlh for skilled nursing, PT, OT     Specimen Type:   Specimen Source:   Specimen Taken Date:   Specimen Taken Time:                         Verbal Order Mode: Telephone with readback   Authorizing Provider: BAL Morrow  Authorizing Provider's NPI: [id number]     Order Entered By: Ashley Roa 8/21/2017  1:09 PM     Electronically signed by:                  History & Physical      Sonia Plata MD at 8/20/2017  4:16 AM              [facility] Medicine Services  HISTORY AND PHYSICAL    Primary Care Physician: BAL Farmer    Subjective     Chief Complaint: Urine leakage from owusu catheter    History of Present Illness:     Mr. 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 He has chronic indwelling Owusu catheter that is replaced every 4 weeks. He states that he's had multiple antibiotic courses for presumed urinary tract infection often drawn from his Owusu bag without any urinary symptoms or systemic signs of infection. 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 His last urine sample grew pseudomonas aeruginosa resistant to oral  antibiotics. ID was consulted and given positive urine culture and fever, he was treated with short course of Cefepime 1g Q8H with plans for f/u with [facility] with goal of discontinuation of Oneal catheter due to increased risk of recurrent resistant organism UTI. F/U was appt scheduled for August 16, 2017, however, pt reports that he had to cancel appt due to not having a ride. He reports that he plans to follow up in the near future.    He presents to [facility] ED today for c/o urine leakage from his Oneal catheter. He states that today his home health nurse was replacing his Oneal catheter as per usual. However, after the catheter was inserted he reported pain and noted urinary leakage. He also reported dysuria. He denies F/C, pelvic pain, or flank pain, abdominal pain, N/V/D, constipation, or any other acute symptoms. He presented to the ED for further evaluation of urinary leakage. In the ED, pt's Oneal catheter was removed and replaced with new catheter. Per nurse, pt had 25 cc of saline removed from Oneal balloon. Also, when catheter was being removed there was noted to be blood in his urine. When Oneal was replaced there was significant amount of blood returned that quickly cleared with urine flow. Urinalysis was sent which showed red, cloudy colored urine with large blood, moderate leukocytes, positive nitrites, and trace bacteria. He was diagnosed with UTI and treated with IV Cefepime. Hospital Medicine service was consulted for admission and further evaluation and management.     Review of Systems   Constitutional: Negative for chills and fever.   Respiratory: Negative for cough and shortness of breath.    Cardiovascular: Positive for leg swelling (chronic leg swelling). Negative for chest pain and palpitations.   Gastrointestinal: Negative for abdominal pain, blood in stool, constipation, diarrhea, nausea and vomiting.   Genitourinary: Positive for dysuria and penile pain. Negative for difficulty urinating,  flank pain, frequency, hematuria, penile swelling, scrotal swelling, testicular pain and urgency.   Musculoskeletal:        Reports paraplegia     Skin: Negative for color change, pallor and rash.   Neurological: Negative for dizziness, syncope, weakness and light-headedness.   Psychiatric/Behavioral: Negative for confusion.   All other systems reviewed and are negative.     Otherwise complete ROS performed and negative except as mentioned in the HPI.    Past Medical History:   Diagnosis Date   • A-fib    • Arthritis    • Back disorder     patient states he cannot walk or stand due to back pain since february 2017   • Cancer    • CHF (congestive heart failure)    • Chronic indwelling Oneal catheter 8/20/2017   • Deep vein thrombosis    • Fracture     left wrist, left hand > 10 years ago    • Hypertension      Past Surgical History:   Procedure Laterality Date   • KNEE SURGERY     • SKIN BIOPSY     • SKIN CANCER EXCISION      left ear removed, multiple areas removed    • SKIN SURGERY      basal cell and melanoma   • VASECTOMY       Family History   Problem Relation Age of Onset   • Cancer Father    • Arthritis Sister      Social History     Social History   • Marital status:      Spouse name: N/A   • Number of children: N/A   • Years of education: N/A     Occupational History   • Not on file.     Social History Main Topics   • Smoking status: Never Smoker   • Smokeless tobacco: Never Used   • Alcohol use No   • Drug use: No   • Sexual activity: Defer     Other Topics Concern   • Not on file     Social History Narrative     Medications:  Prescriptions Prior to Admission   Medication Sig Dispense Refill Last Dose   • calcium carbonate (TUMS) 500 MG chewable tablet Chew 1 tablet Daily.   Taking   • cholecalciferol (VITAMIN D3) 1000 UNITS tablet Take 1 tablet by mouth 2 (Two) Times a Day. 180 tablet 1 Taking   • Digestive Enzymes (ACIDOLL) capsule Take  by mouth.   Taking   • famotidine (PEPCID) 20 MG tablet Take  "1 tablet by mouth Daily. 90 tablet 1 Taking   • finasteride (PROSCAR) 5 MG tablet Take 1 tablet by mouth Daily. 90 tablet 1 Taking   • gabapentin (NEURONTIN) 400 MG capsule Take 1 capsule by mouth 3 (Three) Times a Day. 270 capsule 1 Taking   • Glucosamine-Chondroit-Vit C-Mn (GLUCOSAMINE CHONDR 1500 COMPLX PO) Take  by mouth.   Taking   • methenamine (HIPREX) 1 G tablet Take 1 tablet by mouth 2 (Two) Times a Day. 180 tablet 1 Taking   • metoprolol tartrate (LOPRESSOR) 50 MG tablet Take 1 tablet by mouth 2 (Two) Times a Day. 180 tablet 1 Taking   • Multiple Vitamins-Minerals (MULTIVITAMIN ADULT PO) Take  by mouth.   Taking   • nystatin (MYCOSTATIN) 437424 UNIT/GM ointment Apply  topically Every 12 (Twelve) Hours. 30 g 0 Taking   • oxyCODONE-acetaminophen (PERCOCET)  MG per tablet 1 by mouth every 4-6 hours as needed for pain 18 tablet 0    • tamsulosin (FLOMAX) 0.4 MG capsule 24 hr capsule Take 1 capsule by mouth Daily. 90 capsule 1 Taking   • vitamin C (VITAMIN C) 500 MG tablet Take 1 tablet by mouth Daily. 30 tablet 0 Taking   • XARELTO 20 MG tablet Take 1 tablet by mouth Daily. 90 tablet 1 Taking     Allergies:  Allergies   Allergen Reactions   • Penicillins Hives     Objective     Physical Exam:  Vital Signs: /88 (BP Location: Left arm, Patient Position: Lying)  Pulse 72  Temp 97.3 °F (36.3 °C) (Oral)   Resp 18  Ht 74\" (188 cm)  Wt 263 lb 11.2 oz (120 kg)  SpO2 95%  BMI 33.86 kg/m2  Physical Exam   Constitutional: He is oriented to person, place, and time. He appears well-developed and well-nourished. He is cooperative. He is easily aroused.   Obese,  male. No apparent distress noted.   HENT:   Head: Normocephalic and atraumatic.   Eyes: EOM are normal. Pupils are equal, round, and reactive to light. No scleral icterus.   Neck: Normal range of motion. Neck supple. No thyromegaly present.   Cardiovascular:   Pulses:       Radial pulses are 2+ on the right side, and 2+ on the left side. "        Dorsalis pedis pulses are 2+ on the right side, and 2+ on the left side.        Posterior tibial pulses are 2+ on the right side, and 2+ on the left side.   Pulmonary/Chest: Effort normal and breath sounds normal. No respiratory distress. He has no wheezes. He has no rales. He exhibits no tenderness.   Abdominal: Soft. Bowel sounds are normal. He exhibits no distension and no mass. There is no tenderness. There is no rebound and no guarding. No hernia.   Abdomen round, obese, soft, and non tender.   Musculoskeletal: He exhibits edema and tenderness.   Neurological: He is alert, oriented to person, place, and time and easily aroused. He exhibits abnormal muscle tone. GCS eye subscore is 4. GCS verbal subscore is 5. GCS motor subscore is 6.   Decreased ROM in bilateral lower extremities. Pt is able to slightly move lower extremities against gravity, right > left. Sensation noted in BLE.    Skin: Skin is warm and dry.   Psychiatric: He has a normal mood and affect. His speech is normal and behavior is normal. Judgment and thought content normal. Cognition and memory are normal.   Vitals reviewed.    Results Reviewed:  Lab Results (last 24 hours)     Procedure Component Value Units Date/Time    CBC Auto Differential [[id number]]  (Abnormal) Collected:  08/19/17 2221    Specimen:  Blood Updated:  08/19/17 2248     WBC 6.37 10*3/mm3      RBC 5.21 10*6/mm3      Hemoglobin 13.1 g/dL      Hematocrit 41.6 %      MCV 79.8 (L) fL      MCH 25.1 (L) pg      MCHC 31.5 (L) g/dL      RDW 21.3 (H) %      RDW-SD 62.3 (H) fl      MPV 9.4 fL      Platelets 161 10*3/mm3      Neutrophil % 67.5 %      Lymphocyte % 17.6 (L) %      Monocyte % 11.9 %      Eosinophil % 2.5 %      Basophil % 0.3 %      Immature Grans % 0.2 %      Neutrophils, Absolute 4.30 10*3/mm3      Lymphocytes, Absolute 1.12 10*3/mm3      Monocytes, Absolute 0.76 10*3/mm3      Eosinophils, Absolute 0.16 10*3/mm3      Basophils, Absolute 0.02 10*3/mm3      Immature  Grans, Absolute 0.01 10*3/mm3     Comprehensive Metabolic Panel [[id number]]  (Abnormal) Collected:  08/19/17 2221    Specimen:  Blood Updated:  08/19/17 2305     Glucose 130 (H) mg/dL      BUN 25 (H) mg/dL      Creatinine 0.70 mg/dL      Sodium 138 mmol/L      Potassium 4.2 mmol/L      Chloride 106 mmol/L      CO2 32.0 (H) mmol/L      Calcium 8.4 (L) mg/dL      Total Protein 5.9 g/dL      Albumin 2.90 (L) g/dL      ALT (SGPT) 18 U/L      AST (SGOT) 23 U/L      Alkaline Phosphatase 106 (H) U/L      Total Bilirubin 0.4 mg/dL      eGFR Non African Amer 107 mL/min/1.73      Globulin 3.0 gm/dL      A/G Ratio 1.0 (L) g/dL      BUN/Creatinine Ratio 35.7 (H)     Anion Gap 0.0 (L) mmol/L     Narrative:       National Kidney [employer] Guidelines    Stage     Description        GFR  1         Normal or High     90+  2         Mild decrease      60-89  3         Moderate decrease  30-59  4         Severe decrease    15-29  5         Kidney failure     <15    Urine Culture [[id number]] Collected:  08/19/17 2243    Specimen:  Urine from Urine, Clean Catch Updated:  08/19/17 2312    Urinalysis With / Culture If Indicated [[id number]]  (Abnormal) Collected:  08/19/17 2243    Specimen:  Urine from Urine, Clean Catch Updated:  08/19/17 2326     Color, UA Red (A)      Corrected result. Previous result was Marylin on 8/19/2017 at 2318 EDT        Appearance, UA Cloudy (A)      Corrected result. Previous result was Clear on 8/19/2017 at 2318 EDT        pH, UA <=5.0     Specific Gravity, UA 1.020     Glucose, UA Negative     Ketones, UA Trace (A)     Bilirubin, UA Small (1+) (A)     Blood, UA Large (3+) (A)     Protein, UA >=300 mg/dL (3+) (A)     Leuk Esterase, UA Moderate (2+) (A)     Nitrite, UA Positive (A)     Urobilinogen, UA 2.0 E.U./dL (A)    Urinalysis, Microscopic Only [[id number]]  (Abnormal) Collected:  08/19/17 2243    Specimen:  Urine from Urine, Clean Catch Updated:  08/19/17 2337     RBC, UA Too Numerous to Count (A) /HPF       WBC, UA 13-20 (A) /HPF      Bacteria, UA Trace /HPF      Squamous Epithelial Cells, UA 0-2 /HPF      Hyaline Casts, UA 0-6 /LPF      Methodology Manual Light Microscopy        I have personally reviewed and interpreted available lab data, radiology studies and ECG obtained at time of admission.     Assessment / Plan     Problem List:   Hospital Problem List     * (Principal)Urinary tract infection associated with indwelling urethral catheter    Chronic a-fib    Lower paraplegia    Overview Signed 8/1/2017  1:41 PM by Chelsie Cardozo MD     From spinal stenosis         Hematuria    Chronic indwelling Oneal catheter    Leakage from urinary catheter        Assessment/Plan:  Urinary tract infection associated with indwelling urethral catheter vs recurrent bacterial colonization with resistant organism  -Oneal catheter replaced in the ED today, hematuria present, UA sent, diagnosed with UTI, given IV Cefepime  -will continue IV Cefepime 1gm Q8H; consult ID in the AM for recurrent UTI vs bacterial colonization  -strict I&O's, daily weights, follow urine culture; routine AM labs  -consult CM in the AM for discharge planning; pt will need outpatient f/u appt scheduled again with  urology  -consult PT/OT in the AM to assess mobility    DVT prophylaxis:  -TEDs and SCDs    Code Status:   -DNR    Admission Status: Patient will be admitted to (LEXOBS or LEXINPT)     BAL Tai 08/20/17 5:21 AM      PHYSICIAN NOTE(ADDENDUM)    84-year-old male presented to the ER with the chief complaint of urine leaking around his Oneal catheter with some blood.    Patient has chronic indwelling Oneal catheter since February 2016 most likely secondary to incontinent/neurogenic bladder along with his enlarged prostate.  His Oneal catheter has been replaced every 4 weeks.  He was recently admitted with us in July with possible asymptomatic pseudomonas colonization vs infection-Rx with cefepime, and was suppose to fup with   urology.    Today after replacing owusu, UA  Was positive- but no wbc and no fever, er attending felt he should be Rx as this could be real infection, started on cefepime, and admitted for possible id input.    On exam no ABD pain, mild wheezing, co of dry cough-will order chest x ray prn nebs.    Pt chronically disabled and cannot walk 2nd to lower ext weakness from his herniated lumbar disc.    Chronic Afib-on lopressor and xarelto-will hold blood thinner 2nd to hematuria and monitor cbc closely.    Rest a/p as per Nps note.      I have independently seen and examined the patient with ARNP and the note above reflects my changes and contributions. I have discussed with findings, diagnosis and plans with the patient and family.     Sonia Plata MD 08/20/17 7:33 AM              Electronically signed by Sonia Plata MD at 8/20/2017  7:33 AM           Physician Progress Notes (last 24 hours) (Notes from 8/20/2017  1:16 PM through 8/21/2017  1:16 PM)      Murphy Correa MD at 8/20/2017  1:38 PM  Version 1 of 1         Please see the history and physical dated today for full details.  Briefly is a chronically ill 84-year-old gentleman with a chronic Owusu catheter.  Recently discharged earlier this month after growing Pseudomonas from urine specimen.  Was seen by infectious disease and treated with cefepime.  Comes back to the emergency room after having some catheter difficulties including leakage and discomfort.  Catheter was replaced in the emergency room and apparently the ER physician was concerned about infection and so was admitted.  No known fevers, no leukocytosis.  Urine culture is pending.  He is our to been seen by Dr. Cobb from infectious disease who recommends continuing cefepime for now and will discuss with Dr. Lockett tomorrow.  I suspect this is related to colonization rather than true infection.  Possible discharge home tomorrow to follow-up with [facility].    Murphy Correa MD        Electronically signed by Murphy Correa MD at 2017  1:40 PM      BAL Colin at 2017 11:36 AM  Version 1 of 1         [facility] Progress Note    Admission Date: 2017    Calderon Scott  1932  [id number]    Date: 2017    Meds:    IV Anti-Infectives     Ordered     Dose/Rate Route Frequency Start Stop    17 0421  cefepime (MAXIPIME) 1 g/100 mL 0.9% NS IVPB (mbp)     Ordering Provider:  BAL Beckett    1 g Intravenous Every 8 Hours Scheduled 17 0500            CC: pyuria/ chronic owusu       SUBJECTIVE: 17: Patient is a 84 y.o.  Yr old male PT OF DR WOLF, with history of chronic Owusu catheter, managed at [facility] with chronic debility.  He was seen by Dr. Wolf with last inpatient visit approximately  associated with abnormal urine culture from  with pseudomonas aeruginosa.  He received some cefepime but unclear exact duration.  He reports having Owusu catheter changed by his home health nurse on .  He cannot recall the exact procedure but felt as if the catheter was tugged on, did not function appropriately and he subsequently had urination around the catheter associated with dysuria.  He came to the emergency room at [facility] with a Owusu catheter was replaced again.  Urinalysis was abnormal and cefepime started empirically.  17:  Was asymptomatic with owusu leaking, no fever, pelvic pain/pressure.  Owusu dc'd last pm.       ROS:  No f/c/s. No n/v/d. No rash. No new ADR to Abx.     PE:   Vital Signs  Temp (24hrs), Av.3 °F (36.3 °C), Min:96.8 °F (36 °C), Max:98.6 °F (37 °C)    Temp  Min: 96.8 °F (36 °C)  Max: 98.6 °F (37 °C)  BP  Min: 129/90  Max: 165/98  Pulse  Min: 48  Max: 86  Resp  Min: 18  Max: 18  SpO2  Min: 97 %  Max: 98 %    GENERAL: Awake and alert, in no acute distress.   HEENT:  No conjunctival injection. No icterus. Oropharynx clear without evidence of thrush or exudate.  NECK: Supple, no JVD      HEART: RRR; No murmur, rubs, gallops.   LUNGS: Clear to auscultation bilaterally without wheezing, rales, rhonchi. Normal respiratory effort. Nonlabored. No dullness.  ABDOMEN: Soft, nontender, nondistended. Positive bowel sounds. No rebound or guarding. NO mass or HSM.  EXT:  No cyanosis, clubbing or edema. No cord.  : Genitalia generally unremarkable.  Without Owusu catheter.- depends   SKIN: Warm and dry without cutaneous eruptions on Inspection/palpation.    NEURO: Alert and oriented x 3, Motor 5/5 bilaterally    Laboratory Data      Results from last 7 days  Lab Units 08/20/17  0911 08/19/17  2221   WBC 10*3/mm3 5.24 6.37   HEMOGLOBIN g/dL 12.4* 13.1   HEMATOCRIT % 39.6 41.6   PLATELETS 10*3/mm3 144* 161       Results from last 7 days  Lab Units 08/20/17  0911   SODIUM mmol/L 137   POTASSIUM mmol/L 3.8   CHLORIDE mmol/L 105   CO2 mmol/L 30.0   BUN mg/dL 21   CREATININE mg/dL 0.60   GLUCOSE mg/dL 136*   CALCIUM mg/dL 8.2*       Results from last 7 days  Lab Units 08/19/17  2221   ALK PHOS U/L 106*   BILIRUBIN mg/dL 0.4   ALT (SGPT) U/L 18   AST (SGOT) U/L 23           UA  TNTC RBCs, 13-20 WBCs, urine culture pending               Estimated Creatinine Clearance: 95.4 mL/min (by C-G formula based on Cr of 0.6).    Microbiology:                    Urine Culture   Date Value Ref Range Status   08/19/2017 Culture in progress  Preliminary          Radiology:  Imaging Results (last 72 hours)     ** No results found for the last 72 hours. **          I personally read the radiographic studies     IMPRESSION:  1. Pyuria/hematuria, in chronic indwelling owusu catheter - he follows urology at   2.  Penile/scrotal edema  3.  Dementia     RECOMMENDATIONS;  1.  DC Cefepime       Dr. Wolf has obtained the history, performed the physical exam and formulated the above treatment plan.   BAL Colin  8/21/2017  11:37 AM         Electronically signed by BAL Colin at 8/21/2017 12:33 PM         Consult Notes (last 24 hours) (Notes from 8/20/2017  1:16 PM through 8/21/2017  1:16 PM)     No notes of this type exist for this encounter.

## 2017-08-21 NOTE — THERAPY WOUND CARE TREATMENT
Acute Care - Wound/Debridement Initial Evaluation  [facility]     Patient Name: Calderon Scott  : 1932  MRN: [id number]  Today's Date: 2017  Onset of Illness/Injury or Date of Surgery Date: 17  Date of Referral to PT: 17   Referring Physician: BAL Soria      Admit Date: 2017    Visit Dx:    ICD-10-CM ICD-9-CM   1. Urinary tract infection associated with indwelling urethral catheter, initial encounter T83.511A 996.64    N39.0 599.0   2. Problem with Oneal catheter, initial encounter T83.9XXA 996.76   3. Impaired mobility and ADLs Z74.09 799.89       Patient Active Problem List   Diagnosis   • Essential hypertension   • Congestive heart failure   • Malignant melanoma of torso excluding breast   • Numbness and tingling of hand   • Deep vein thrombosis (DVT) of proximal vein of right lower extremity   • Pre-ulcerative corn or callous   • Chronic a-fib   • Urinary tract infection associated with catheterization of urinary tract   • Lower paraplegia   • Hematuria   • Chronic indwelling Oneal catheter   • Urinary tract infection associated with indwelling urethral catheter   • Leakage from urinary catheter        Past Medical History:   Diagnosis Date   • A-fib    • Arthritis    • Back disorder     patient states he cannot walk or stand due to back pain since 2017   • Cancer    • CHF (congestive heart failure)    • Chronic indwelling Oneal catheter 2017   • Deep vein thrombosis    • Fracture     left wrist, left hand > 10 years ago    • Hypertension         Past Surgical History:   Procedure Laterality Date   • KNEE SURGERY     • SKIN BIOPSY     • SKIN CANCER EXCISION      left ear removed, multiple areas removed    • SKIN SURGERY      basal cell and melanoma   • VASECTOMY                 LDA Wound       17 1400          Pressure Ulcer 17 1543 Right other (see comments) Stage III    Pressure Ulcer - Properties Group Date first assessed: 17  -FATOU Time first  assessed: 1543  -FATOU Present On Admission (Pressure Ulcer): yes  -FATOU Side: Right  -FATOU Location: other (see comments)  -FATOU, ankle  Stage: Stage III  -FATOU    Dressing Appearance moist drainage;intact  -MF      Pressure Ulcer Appearance slough;granulating;pink;moist  -MF      Periwound Area blanchable;intact  -MF      Length (Pressure Ulcer) (cm) 1.5  -MF      Width (Pressure Ulcer) (cm) 1.5  -MF      Depth (Pressure Ulcer) (cm) 0.3  -MF      Tunneling (Depth/Location) 0  -MF      Undermining (Depth/Location) 0  -MF      Sinus Tract Depth (Depth/Location) 0  -MF      Pressure Ulcer Wound Care irrigated with;other (see comments)   phase one  -MF      Dressing foam;low-adherent   nsaline moistened hydrofera blue to cover wound bed  -MF      Wound 08/20/17 Bilateral midline coccyx other (see comments)    Wound - Properties Group Date first assessed: 08/20/17  -KS Side: Bilateral  -KS Orientation: midline  -KS Location: coccyx  -KS Type: other (see comments)  -KS Additional Comments: pressure ulcer, possible stage 1   -KS      User Key  (r) = Recorded By, (t) = Taken By, (c) = Cosigned By    Initials Name Provider Type     Anthony Lazaro, PT Physical Therapist    FATOU Montelongo, RN Registered Nurse    LORRAINE Woods RN Registered Nurse              Lymphedema       08/21/17 1400          Lymphedema Edema Assessment    Ptting Edema Category By severity  -MF      Pitting Edema Moderate  -MF      Skin Changes/Observations    Location/Assessment Lower Extremity  -      Lower Extremity Conditions bilateral:;clean;dry;shiny  -      Lower Extremity Color/Pigment bilateral:;hyperpigmented  -MF      Lymphedema Pulses/Capillary Refill    Lymphedema Pulses/Capillary Refill lower extremity pulses;capillary refill  -      Dorsalis Pedis Pulse right:;left:;+2 normal  -MF      Posterior Tibialis Pulse right:;left:;+1 diminished  -MF      Capillary Refill lower extremity capillary refill  -      Lower Extremity  Capillary Refill right:;left:;less than 3 seconds  -      Compression/Skin Care    Compression/Skin Care skin care;wrapping location;bandaging  -      Skin Care washed/dried;lotion applied;moisturizing lotion applied  -      Wrapping Location lower extremity  -      Wrapping Location LE bilateral:;foot to knee  -MF      Bandage Layers cotton elastic stocking- double layer (comment size)   size 5 with double layer to foot   -        User Key  (r) = Recorded By, (t) = Taken By, (c) = Cosigned By    Initials Name Provider Type     Anthony Lazaro, PT Physical Therapist            WOUND DEBRIDEMENT                     PT ASSESSMENT (last 72 hours)      PT Evaluation       08/21/17 1400 08/21/17 1149    Rehab Evaluation    Document Type therapy note (daily note)   wound care eval  -     Subjective Information agree to therapy;complains of;pain  -MF     General Information    Equipment Currently Used at Home  lift device;hospital bed;wheelchair  -ML    Living Environment    Lives With  other (see comments)   Lives with granddaughter, her , and their four children  -ML    Living Arrangements  house  -ML    Home Accessibility  no concerns  -ML    Type of Financial/Environmental Concern  none  -ML    Transportation Available  ambulance  -ML    Pain Assessment    Pain Assessment Muro-Baker FACES  -     Muro-Baker FACES Pain Rating 4  -     Pain Type Chronic pain  -     Pain Location Knee  -MF     Pain Orientation Right;Left  -MF     Pain Intervention(s) Repositioned  -     Positioning and Restraints    Pre-Treatment Position in bed  -     Post Treatment Position bed  -     In Bed supine;call light within reach  -       08/21/17 1015 08/21/17 1002    Rehab Evaluation    Document Type evaluation  -DM evaluation  -AR    Subjective Information agree to therapy;complains of;pain  -DM agree to therapy;complains of;pain  -AR    Patient Effort, Rehab Treatment fair  -DM adequate  -AR    Symptoms  "Noted During/After Treatment fatigue;significant change in vital signs  -DM     General Information    Patient Profile Review yes  -DM yes  -AR    Onset of Illness/Injury or Date of Surgery Date 08/19/17  -DM 08/19/17  -AR    Referring Physician BAL Soria  -BAL Mccall    General Observations SUP, in specialty bed;noted ;issued lift sling & chair alarm;on tele;RA;indwelling cath.removed by staff & [place] cath placed over opening last pm;dry flow pads & gown/linens saturated w/ urine; all were changed &  depends per nsg/pt request(\"I have a BM every morning in the Depends at home,& need to do that now\"); iv; heel protector boots(causing indentations in calves d/t allan. edema;nsg concurred w/leaving off temporarily;will place JACKELIN Stockings & SCUDS on pt s/p P.T. eval);no family present  -DM Pt. supine in bed with no family present.  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 bedridden since 2/17 due to spinal stenosis and multiple herniated disc lumbar area.  Pt. with indwelling catheter and with multiple recent infections.  Changed every 4 weeks.  Post  nurse changing pt. with pain and some bleeding and leakage.  To ED.  Pt. found with UTI vs bacterial colonization due to indwelling cath.  -RA    Precautions/Limitations fall precautions   bedridden;grddtr lifts OOB w/jax;contact precaut(MDRP)  -DM fall precautions    indwelling cath, bedridden, up with lift only, contact pre  -AR    Prior Level of Function min assist:;feeding;grooming;max assist:;dressing;bathing;dependent:;gait;transfer;bed mobility;home management;driving;shopping;yard work   Grddtr/her spouse care for pt.;transf " w/ Monica  -DM min assist:;feeding;grooming;max assist:;dressing;bathing;dependent:;bed mobility;gait;transfer;home management;driving;shopping  -AR    Equipment Currently Used at Home lift device;hospital bed;oxygen;ramp;wheelchair  -DM lift device;hospital bed;wheelchair;oxygen;ramp  -AR    Plans/Goals Discussed With patient;agreed upon  -DM patient;agreed upon  -AR    Risks Reviewed patient:;increased discomfort;change in vital signs;lines disloged  -DM patient:;increased discomfort;change in vital signs;lines disloged  -AR    Benefits Reviewed patient:;improve function;increase independence;increase strength;increase knowledge;decrease pain  -DM patient:;improve function;increase independence;increase strength;increase knowledge  -AR    Barriers to Rehab previous functional deficit;physical barrier  -DM previous functional deficit;physical barrier  -AR    Living Environment    Lives With other (see comments);other relative(s) (specify)   lives w/ grddtr, her spouse & 4 kids  -DM --   lives with grandtr, her  and their 4 children.  -AR    Living Arrangements house  -DM house  -AR    Home Accessibility ramps present at home  -DM no concerns  -AR    Type of Financial/Environmental Concern none  -DM     Transportation Available ambulance  -DM     Clinical Impression    Date of Referral to PT 08/19/17  -DM     PT Diagnosis impaired funct mobil  -DM     Criteria for Skilled Therapeutic Interventions Met yes;treatment indicated  -DM     Pathology/Pathophysiology Noted (Describe Specifically for Each System) genitourinary  -DM     Impairments Found (describe specific impairments) gait, locomotion, and balance;ROM  -DM     Rehab Potential fair, will monitor progress closely  -DM     Vital Signs    Pre Systolic BP Rehab 165  -DM     Pre Treatment Diastolic BP 98  -DM     Post Systolic BP Rehab 158  -  -AR    Post Treatment Diastolic BP 88  -DM 88  -AR    Pretreatment Heart Rate (beats/min) 58  -DM      Intratreatment Heart Rate (beats/min) 64  -DM     Posttreatment Heart Rate (beats/min) 58  -DM 58  -AR    O2 Delivery Pre Treatment room air  -DM     O2 Delivery Post Treatment room air  -DM     Pre Patient Position Supine  -DM Supine  -AR    Intra Patient Position Supine  -DM Supine  -AR    Post Patient Position Supine  -DM Supine  -AR    Pain Assessment    Pain Assessment 0-10  -DM 0-10  -AR    Pain Score 7  -DM 7  -AR    Post Pain Score 5  -DM 5  -AR    Pain Type Chronic pain  -DM Chronic pain  -AR    Pain Location Knee  -DM Knee  -AR    Pain Orientation Left;Right  -DM Right;Left  -AR    Pain Descriptors Aching  -DM     Pain Frequency Constant/continuous  -DM     Patient's Stated Pain Goal No pain  -DM     Pain Intervention(s) Repositioned;Ambulation/increased activity   ROM/gentle stretching;removing heel protector boots  -DM --   ROM  -AR    Response to Interventions tolerated  -DM improved  -AR    Vision Assessment/Intervention    Visual Impairment WFL with corrective lenses  -DM WFL with corrective lenses  -AR    Cognitive Assessment/Intervention    Current Cognitive/Communication Assessment functional  -DM functional  -AR    Orientation Status oriented x 4  -DM oriented x 4  -AR    Follows Commands/Answers Questions 100% of the time;able to follow single-step instructions;needs cueing;needs increased time;needs repetition  -% of the time;able to follow single-step instructions  -AR    Personal Safety WNL/WFL  -DM WNL/WFL  -AR    Personal Safety Interventions supervised activity  -DM supervised activity  -AR    ROM (Range of Motion)    General ROM lower extremity range of motion deficits identified   ROM B LE's limited 50-75% d/t pain,swelling,jt stiffness   -DM no range of motion deficits identified;upper extremity range of motion deficits identified  -AR    MMT (Manual Muscle Testing)    General MMT Assessment lower extremity strength deficits identified   B LE strength grossly 1+/5  -DM upper  extremity strength deficits identified  -AR    General MMT Assessment Detail  L shoulder 4/5  -AR    Bed Mobility, Assessment/Treatment    Bed Mobility, Assistive Device bed rails;draw sheet  -DM     Bed Mobility, Roll Left, Bullitt maximum assist (25% patient effort);2 person assist required;verbal cues required  -DM maximum assist (25% patient effort);2 person assist required;verbal cues required;nonverbal cues required (demo/gesture)  -AR    Bed Mobility, Roll Right, [place] maximum assist (25% patient effort);2 person assist required;verbal cues required  -DM maximum assist (25% patient effort);2 person assist required;verbal cues required;nonverbal cues required (demo/gesture)  -AR    Bed Mobility, Scoot/Bridge, Bullitt dependent (less than 25% patient effort);2 person assist required;verbal cues required  -DM     Bed Mob, Supine to Sit, Bullitt unable to perform  -DM     Bed Mobility, Impairments decreased flexibility;ROM decreased;strength decreased;pain  -DM strength decreased;decreased flexibility;ROM decreased;pain  -AR    Bed Mobility, Comment max asst for mult rolling L/R to clean/change/placeDepends s/p incont  -DM Pt. max of 2 with draw sheet to roll. Pt. using bed rail  -AR    Transfer Assessment/Treatment    Transfer, Comment def.Lift to chair w/ sling d/t bedridden/BETHANY transf @ home,& req.to haveBM  -DM inappropr  -AR    Gait Assessment/Treatment    Gait, Bullitt Level not appropriate to assess  -DM     Therapy Exercises    Bilateral Lower Extremities PROM:;AAROM:;10 reps;20 reps;supine;ankle pumps/circles;calf stretch;glut sets;heel slides;hip abduction/adduction;hip ER;hip IR;quad sets;SAQ   HS sets;BKFO;issued copy of HEP  -DM     BUE Resistance  theraband   10 reps x 6 exer with cues.  -AR    Sensory Assessment/Intervention    Sensory Impairment  numbness  -AR    Light Touch  RUE   per pt. with carpal tunnel and intermittent numbness.  -AR    Positioning and Restraints     Pre-Treatment Position in bed  -DM in bed  -AR    Post Treatment Position bed  -DM bed  -AR    In Bed notified nsg;supine;call light within reach;encouraged to call for assist;with nsg   NSG in/out,checking pt,;will place JACKELIN stockings&SCUDS  -DM supine;call light within reach;encouraged to call for assist   waffle boots off due to endentions legs, nurse notified.  -AR      08/21/17 0600 08/21/17 0400    Muscle Tone Assessment    Muscle Tone Assessment Bilateral Lower Extremities  -LD Bilateral Lower Extremities  -LD    Bilateral Lower Extremities Muscle Tone Assessment moderately decreased tone  -LD moderately decreased tone  -LD      08/21/17 0200 08/21/17 0000    Muscle Tone Assessment    Muscle Tone Assessment Bilateral Lower Extremities  -LD Bilateral Lower Extremities  -LD    Bilateral Lower Extremities Muscle Tone Assessment moderately decreased tone  -LD moderately decreased tone  -LD      08/20/17 2200 08/20/17 2000    Muscle Tone Assessment    Muscle Tone Assessment Bilateral Lower Extremities  -LD Bilateral Lower Extremities  -LD    Bilateral Lower Extremities Muscle Tone Assessment moderately decreased tone  -LD moderately decreased tone  -LD      08/20/17 1754 08/20/17 0825    General Information    Equipment Currently Used at Home hospital bed;wheelchair;oxygen  -FATOU     Living Environment    Transportation Available ambulance  -FATOU     Muscle Tone Assessment    Muscle Tone Assessment  Bilateral Lower Extremities  -KS    Bilateral Lower Extremities Muscle Tone Assessment  moderately decreased tone  -KS      08/20/17 0205       General Information    Equipment Currently Used at Home hospital bed;wheelchair;lift device  -MW     Living Environment    Lives With child(amanda), adult  -MW     Living Arrangements house  -MW     Home Accessibility no concerns  -MW     Stair Railings at Home none  -MW     Type of Financial/Environmental Concern none  -MW     Transportation Available car;family or friend will  provide  -       User Key  (r) = Recorded By, (t) = Taken By, (c) = Cosigned By    Initials Name Provider Type    AR Lurdes Collins, OT Occupational Therapist    MF Anthony Lazaro, PT Physical Therapist    DM Alondra Morin, PT Physical Therapist    ML Ashley Roa      Jose Antonio Pinzon, RN Registered Nurse    FATOU Maxwell, RN Registered Nurse    CHANEL King, RN Registered Nurse    LORRAINE Woods RN Registered Nurse                Recommendation and Plan  Anticipated Discharge Disposition: home with home health  PT Frequency: 3 times/wk    Plan Of Care Reviewed With: patient       Outcome Summary/Follow up Plan: Pt presents with chronic open wound to lateral R ankle. PT applied light compression to help increase venous return to improve healing potential and increase skin integrity              IP PT Goals       08/21/17 1400 08/21/17 1120       Bed Mobility PT STG    Bed Mobility PT STG, Date Established  08/21/17  -DM     Bed Mobility PT STG, Time to Achieve  3 days  -DM     Bed Mobility PT STG, Activity Type  roll left/roll right;scoot/bridge  -DM     Bed Mobility PT STG, Mercer Level  maximum assist (25% patient effort);2 person assist required  -DM     Transfer Training Goal, Assist Device  --   DRAW SHEET  -DM     Transfer Training PT STG    Transfer Training PT STG, Date Established  08/21/17  -DM     Transfer Training PT STG, Time to Achieve  3 days  -DM     Transfer Training PT STG, Activity Type  bed to chair /chair to bed  -DM     Transfer Training PT STG, Mercer Level  dependent (less than 25% patient effort);2 person assist required   stable VS  -DM     Transfer Training PT STG, Assist Device  other (see comments)   w/ mech lift bed to chair  -DM     Patient Education PT STG    Patient Education PT STG, Date Established  08/21/17  -DM     Patient Education PT STG, Time to Achieve  3 days  -DM     Patient Education PT STG, Education Type   written program;HEP;benefits of activity   ROM exer  -DM     Patient Education PT STG, Education Understanding  demonstrate adequately;verbalize understanding  -DM     Wound Care PT LTG    Wound Care PT LTG 1, Date Established 08/21/17  -MF      Wound Care PT LTG 1, Time to Achieve other (see comments)   10 days  -MF      Wound Care PT LTG 1, Location BLEs  -MF      Wound Care PT LTG 1, No S&S of Infection yes  -MF      Wound Care PT LTG 1, Education wound care;edema management  -MF      Wound Care PT LTG 1, Education Understanding verbalize understanding  -MF      Wound Care PT LTG 1, Additional Goal Decrease BLE edema to minimal   -        User Key  (r) = Recorded By, (t) = Taken By, (c) = Cosigned By    Initials Name Provider Type    SAROJ Lazaro, PT Physical Therapist    MARY Morin, PT Physical Therapist                Outcome Measures       08/21/17 1015 08/21/17 1002       How much help from another person do you currently need...    Turning from your back to your side while in flat bed without using bedrails? 2  -DM      Moving from lying on back to sitting on the side of a flat bed without bedrails? 1  -DM      Moving to and from a bed to a chair (including a wheelchair)? 1  -DM      Standing up from a chair using your arms (e.g., wheelchair, bedside chair)? 1  -DM      Climbing 3-5 steps with a railing? 1  -DM      To walk in hospital room? 1  -DM      AM-PAC 6 Clicks Score 7  -DM      How much help from another is currently needed...    Putting on and taking off regular lower body clothing?  1  -AR     Bathing (including washing, rinsing, and drying)  1  -AR     Toileting (which includes using toilet bed pan or urinal)  1  -AR     Putting on and taking off regular upper body clothing  2  -AR     Taking care of personal grooming (such as brushing teeth)  3  -AR     Eating meals  3  -AR     Score  11  -AR     Functional Assessment    Outcome Measure Options AM-PAC 6 Clicks Basic Mobility  (PT)  -DM AM-PAC 6 Clicks Daily Activity (OT)  -AR       User Key  (r) = Recorded By, (t) = Taken By, (c) = Cosigned By    Initials Name Provider Type    AR Lurdes Collins, OT Occupational Therapist    MARY Morin, PT Physical Therapist              Time Calculation        PT Charges       08/21/17 1400 08/21/17 1120       Time Calculation    Start Time 1400  - 1015  -DM     PT Received On  08/21/17  -DM     PT Goal Re-Cert Due Date 08/31/17  -MF 08/31/17  -DM     Time Calculation- PT    Total Timed Code Minutes- PT 35 minute(s)  -MF 35 minute(s)  -DM       User Key  (r) = Recorded By, (t) = Taken By, (c) = Cosigned By    Initials Name Provider Type     Anthony Lazaro, PT Physical Therapist    MARY Morin, PT Physical Therapist            Therapy Charges for Today     Code Description Service Date Service Provider Modifiers Qty    [id number] HC PT MULTI LAYER COMP SYS BELOW KNEE 8/21/2017 Anthony Lazaro, PT GP 1            PT G-Codes  Outcome Measure Options: AM-PAC 6 Clicks Basic Mobility (PT)       Anthony Lazaro, PT  8/21/2017

## 2017-08-21 NOTE — THERAPY EVALUATION
Acute Care - Physical Therapy Initial Evaluation  [facility]     Patient Name: Calderon Scott  : 1932  MRN: [id number]  Today's Date: 2017   Onset of Illness/Injury or Date of Surgery Date: 17  Date of Referral to PT: 17  Referring Physician: BAL Soria      Admit Date: 2017     Visit Dx:    ICD-10-CM ICD-9-CM   1. Urinary tract infection associated with indwelling urethral catheter, initial encounter T83.511A 996.64    N39.0 599.0   2. Problem with Oneal catheter, initial encounter T83.9XXA 996.76   3. Impaired mobility and ADLs Z74.09 799.89     Patient Active Problem List   Diagnosis   • Essential hypertension   • Congestive heart failure   • Malignant melanoma of torso excluding breast   • Numbness and tingling of hand   • Deep vein thrombosis (DVT) of proximal vein of right lower extremity   • Pre-ulcerative corn or callous   • Chronic a-fib   • Urinary tract infection associated with catheterization of urinary tract   • Lower paraplegia   • Hematuria   • Chronic indwelling Oneal catheter   • Urinary tract infection associated with indwelling urethral catheter   • Leakage from urinary catheter     Past Medical History:   Diagnosis Date   • A-fib    • Arthritis    • Back disorder     patient states he cannot walk or stand due to back pain since 2017   • Cancer    • CHF (congestive heart failure)    • Chronic indwelling Oneal catheter 2017   • Deep vein thrombosis    • Fracture     left wrist, left hand > 10 years ago    • Hypertension      Past Surgical History:   Procedure Laterality Date   • KNEE SURGERY     • SKIN BIOPSY     • SKIN CANCER EXCISION      left ear removed, multiple areas removed    • SKIN SURGERY      basal cell and melanoma   • VASECTOMY            PT ASSESSMENT (last 72 hours)      PT Evaluation       17 1149 17 1015    Rehab Evaluation    Document Type  evaluation  -DM    Subjective Information  agree to therapy;complains of;pain   "-DM    Patient Effort, Rehab Treatment  fair  -DM    Symptoms Noted During/After Treatment  fatigue;significant change in vital signs  -DM    General Information    Patient Profile Review  yes  -DM    Onset of Illness/Injury or Date of Surgery Date  08/19/17  -DM    Referring Physician  BAL Soria  -DM    General Observations  SUP, in specialty bed;noted ;issued lift sling & chair alarm;on tele;RA;indwelling cath.removed by staff & [place] cath placed over opening last pm;dry flow pads & gown/linens saturated w/ urine; all were changed &  depends per nsg/pt request(\"I have a BM every morning in the Depends at home,& need to do that now\"); iv; heel protector boots(causing indentations in calves d/t allan. edema;nsg concurred w/leaving off temporarily;will place JACKELIN Stockings & SCUDS on pt s/p P.T. 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, w/ indwelling cath, & mult recent infections (cath was changed q/4 wks);HH Nsg changing pt on 8-19 & noted pain/bleeding/leakage; EMS transported to [facility] ER; dx w/hematuria, UTI, w/bacterial coloniz d/t indwell catheter (MDR pseudomonas)  -DM    Precautions/Limitations  fall precautions   bedridden;grddtr lifts OOB w/monica;contact precaut(MDRP)  -DM    Prior Level of Function  min assist:;feeding;grooming;max assist:;dressing;bathing;dependent:;gait;transfer;bed mobility;home management;driving;shopping;yard work   Grddtr/her spouse care for pt.;transf w/ Monica  -DM    Equipment Currently Used at Home lift device;hospital bed;wheelchair  -ML lift device;hospital bed;oxygen;ramp;wheelchair  -DM    Plans/Goals Discussed With  patient;agreed upon  -DM    Risks Reviewed  patient:;increased discomfort;change in vital signs;lines disloged  -DM    Benefits Reviewed  patient:;improve function;increase independence;increase strength;increase knowledge;decrease pain  -DM    Barriers to " Rehab  previous functional deficit;physical barrier  -DM    Living Environment    Lives With other (see comments)   Lives with granddaughter, her , and their four children  -ML other (see comments);other relative(s) (specify)   lives w/ grddtr, her spouse & 4 kids  -DM    Living Arrangements house  -ML house  -DM    Home Accessibility no concerns  -ML ramps present at home  -DM    Type of Financial/Environmental Concern none  -ML none  -DM    Transportation Available ambulance  -ML ambulance  -DM    Clinical Impression    Date of Referral to PT  08/19/17  -DM    PT Diagnosis  impaired funct mobil  -DM    Criteria for Skilled Therapeutic Interventions Met  yes;treatment indicated  -DM    Pathology/Pathophysiology Noted (Describe Specifically for Each System)  genitourinary  -DM    Impairments Found (describe specific impairments)  gait, locomotion, and balance;ROM  -DM    Rehab Potential  fair, will monitor progress closely  -DM    Vital Signs    Pre Systolic BP Rehab  165  -DM    Pre Treatment Diastolic BP  98  -DM    Post Systolic BP Rehab  158  -DM    Post Treatment Diastolic BP  88  -DM    Pretreatment Heart Rate (beats/min)  58  -DM    Intratreatment Heart Rate (beats/min)  64  -DM    Posttreatment Heart Rate (beats/min)  58  -DM    O2 Delivery Pre Treatment  room air  -DM    O2 Delivery Post Treatment  room air  -DM    Pre Patient Position  Supine  -DM    Intra Patient Position  Supine  -DM    Post Patient Position  Supine  -DM    Pain Assessment    Pain Assessment  0-10  -DM    Pain Score  7  -DM    Post Pain Score  5  -DM    Pain Type  Chronic pain  -DM    Pain Location  Knee  -DM    Pain Orientation  Left;Right  -DM    Pain Descriptors  Aching  -DM    Pain Frequency  Constant/continuous  -DM    Patient's Stated Pain Goal  No pain  -DM    Pain Intervention(s)  Repositioned;Ambulation/increased activity   ROM/gentle stretching;removing heel protector boots  -DM    Response to Interventions  tolerated   -DM    Vision Assessment/Intervention    Visual Impairment  WFL with corrective lenses  -DM    Cognitive Assessment/Intervention    Current Cognitive/Communication Assessment  functional  -DM    Orientation Status  oriented x 4  -DM    Follows Commands/Answers Questions  100% of the time;able to follow single-step instructions;needs cueing;needs increased time;needs repetition  -DM    Personal Safety  WNL/WFL  -DM    Personal Safety Interventions  supervised activity  -DM    ROM (Range of Motion)    General ROM  lower extremity range of motion deficits identified   ROM B LE's limited 50-75% d/t pain,swelling,jt stiffness   -DM    MMT (Manual Muscle Testing)    General MMT Assessment  lower extremity strength deficits identified   B LE strength grossly 1+/5  -DM    Bed Mobility, Assessment/Treatment    Bed Mobility, Assistive Device  bed rails;draw sheet  -DM    Bed Mobility, Roll Left, [place]  maximum assist (25% patient effort);2 person assist required;verbal cues required  -DM    Bed Mobility, Roll Right, [place]  maximum assist (25% patient effort);2 person assist required;verbal cues required  -DM    Bed Mobility, Scoot/Bridge, [place]  dependent (less than 25% patient effort);2 person assist required;verbal cues required  -DM    Bed Mob, Supine to Sit, [place]  unable to perform  -DM    Bed Mobility, Impairments  decreased flexibility;ROM decreased;strength decreased;pain  -DM    Bed Mobility, Comment  max asst for mult rolling L/R to clean/change/placeDepends s/p incont  -DM    Transfer Assessment/Treatment    Transfer, Comment  def.Lift to chair w/ sling d/t bedridden/BETHANY transf @ home,& req.to haveBM  -DM    Gait Assessment/Treatment    Gait, [place] Level  not appropriate to assess  -DM    Therapy Exercises    Bilateral Lower Extremities  PROM:;AAROM:;10 reps;20 reps;supine;ankle pumps/circles;calf stretch;glut sets;heel slides;hip abduction/adduction;hip ER;hip IR;quad sets;SAQ    HS sets;BKFO;issued copy of HEP  -DM    Positioning and Restraints    Pre-Treatment Position  in bed  -DM    Post Treatment Position  bed  -DM    In Bed  notified nsg;supine;call light within reach;encouraged to call for assist;with nsg   NSG in/out,checking pt,;will place JACKELIN stockings&SCUDS  -DM      08/21/17 1002 08/21/17 0600    Rehab Evaluation    Document Type evaluation  -AR     Subjective Information agree to therapy;complains of;pain  -AR     Patient Effort, Rehab Treatment adequate  -AR     General Information    Patient Profile Review yes  -AR     Onset of Illness/Injury or Date of Surgery Date 08/19/17  -AR     Referring Physician ABL Soria  -AR     General Observations Pt. supine in bed with no family present.  -AR     Pertinent History Of Current Problem Pt. bedridden since 2/17 due to spinal stenosis and multiple herniated disc lumbar area.  Pt. with indwelling catheter and with multiple recent infections.  Changed every 4 weeks.  Post  nurse changing pt. with pain and some bleeding and leakage.  To ED.  Pt. found with UTI vs bacterial colonization due to indwelling cath.  -AR     Precautions/Limitations fall precautions    indwelling cath, bedridden, up with lift only, contact pre  -AR     Prior Level of Function min assist:;feeding;grooming;max assist:;dressing;bathing;dependent:;bed mobility;gait;transfer;home management;driving;shopping  -AR     Equipment Currently Used at Home lift device;hospital bed;wheelchair;oxygen;ramp  -AR     Plans/Goals Discussed With patient;agreed upon  -AR     Risks Reviewed patient:;increased discomfort;change in vital signs;lines disloged  -AR     Benefits Reviewed patient:;improve function;increase independence;increase strength;increase knowledge  -AR     Barriers to Rehab previous functional deficit;physical barrier  -AR     Living Environment    Lives With --   lives with grandtr, her  and their 4 children.  -AR     Living Arrangements house  -AR      Home Accessibility no concerns  -AR     Vital Signs    Post Systolic BP Rehab 158  -AR     Post Treatment Diastolic BP 88  -AR     Posttreatment Heart Rate (beats/min) 58  -AR     Pre Patient Position Supine  -AR     Intra Patient Position Supine  -AR     Post Patient Position Supine  -AR     Pain Assessment    Pain Assessment 0-10  -AR     Pain Score 7  -AR     Post Pain Score 5  -AR     Pain Type Chronic pain  -AR     Pain Location Knee  -AR     Pain Orientation Right;Left  -AR     Pain Intervention(s) --   ROM  -AR     Response to Interventions improved  -AR     Vision Assessment/Intervention    Visual Impairment WFL with corrective lenses  -AR     Cognitive Assessment/Intervention    Current Cognitive/Communication Assessment functional  -AR     Orientation Status oriented x 4  -AR     Follows Commands/Answers Questions 100% of the time;able to follow single-step instructions  -AR     Personal Safety WNL/WFL  -AR     Personal Safety Interventions supervised activity  -AR     ROM (Range of Motion)    General ROM no range of motion deficits identified;upper extremity range of motion deficits identified  -RA     MMT (Manual Muscle Testing)    General MMT Assessment upper extremity strength deficits identified  -AR     General MMT Assessment Detail L shoulder 4/5  -AR     Muscle Tone Assessment    Muscle Tone Assessment  Bilateral Lower Extremities  -LD    Bilateral Lower Extremities Muscle Tone Assessment  moderately decreased tone  -LD    Bed Mobility, Assessment/Treatment    Bed Mobility, Roll Left, [place] maximum assist (25% patient effort);2 person assist required;verbal cues required;nonverbal cues required (demo/gesture)  -AR     Bed Mobility, Roll Right, [place] maximum assist (25% patient effort);2 person assist required;verbal cues required;nonverbal cues required (demo/gesture)  -AR     Bed Mobility, Impairments strength decreased;decreased flexibility;ROM decreased;pain  -AR     Bed Mobility,  Comment Pt. max of 2 with draw sheet to roll. Pt. using bed rail  -AR     Transfer Assessment/Treatment    Transfer, Comment inappropr  -AR     Therapy Exercises    BUE Resistance theraband   10 reps x 6 exer with cues.  -AR     Sensory Assessment/Intervention    Sensory Impairment numbness  -AR     Light Touch RUE   per pt. with carpal tunnel and intermittent numbness.  -AR     Positioning and Restraints    Pre-Treatment Position in bed  -AR     Post Treatment Position bed  -AR     In Bed supine;call light within reach;encouraged to call for assist   waffle boots off due to endentions legs, nurse notified.  -AR       08/21/17 0400 08/21/17 0200    Muscle Tone Assessment    Muscle Tone Assessment Bilateral Lower Extremities  -LD Bilateral Lower Extremities  -LD    Bilateral Lower Extremities Muscle Tone Assessment moderately decreased tone  -LD moderately decreased tone  -LD      08/21/17 0000 08/20/17 2200    Muscle Tone Assessment    Muscle Tone Assessment Bilateral Lower Extremities  -LD Bilateral Lower Extremities  -LD    Bilateral Lower Extremities Muscle Tone Assessment moderately decreased tone  -LD moderately decreased tone  -LD      08/20/17 2000 08/20/17 1754    General Information    Equipment Currently Used at Home  hospital bed;wheelchair;oxygen  -FATOU    Living Environment    Transportation Available  ambulance  -    Muscle Tone Assessment    Muscle Tone Assessment Bilateral Lower Extremities  -LD     Bilateral Lower Extremities Muscle Tone Assessment moderately decreased tone  -LD       08/20/17 0825 08/20/17 0205    General Information    Equipment Currently Used at Home  hospital bed;wheelchair;lift device  -MW    Living Environment    Lives With  child(amanda), adult  -MW    Living Arrangements  house  -MW    Home Accessibility  no concerns  -MW    Stair Railings at Home  none  -MW    Type of Financial/Environmental Concern  none  -MW    Transportation Available  car;family or friend will provide  -MW     Muscle Tone Assessment    Muscle Tone Assessment Bilateral Lower Extremities  -KS     Bilateral Lower Extremities Muscle Tone Assessment moderately decreased tone  -KS       User Key  (r) = Recorded By, (t) = Taken By, (c) = Cosigned By    Initials Name Provider Type    AR Collins, OT Occupational Therapist    MARY Morin, PT Physical Therapist    DUSTIN Roa     MW Jose Antonio Pinzon, RN Registered Nurse    FATOU Maxwell, RN Registered Nurse    CHANEL King, RN Registered Nurse    LORRAINE Woods, RN Registered Nurse              PT Recommendation and Plan  Anticipated Discharge Disposition: home with home health  PT Frequency: 3 times/wk  Plan of Care Review  Plan Of Care Reviewed With: patient  Progress: progress toward functional goals is gradual  Outcome Summary/Follow up Plan: Presents w/ evolving sympt, incl. signif kashif. knee/leg pain at rest & w/mvmt, elev BP, dillon, cellulitis,allan.edema, & weakness/impaired bed mobil d/t UTI; able to zulema. mult trials rolling/repositioning to HOB, & ROM Exer to LE'S, but c/o fatigue; will instruct grddtr/pt. in ROM  HEP;plans to resume HHPT/OT/NSG at d/c             IP PT Goals       08/21/17 1120          Bed Mobility PT STG    Bed Mobility PT STG, Date Established 08/21/17  -DM      Bed Mobility PT STG, Time to Achieve 3 days  -DM      Bed Mobility PT STG, Activity Type roll left/roll right;scoot/bridge  -DM      Bed Mobility PT STG, [place] Level maximum assist (25% patient effort);2 person assist required  -DM      Transfer Training Goal, Assist Device --   DRAW SHEET  -DM      Transfer Training PT STG    Transfer Training PT STG, Date Established 08/21/17  -DM      Transfer Training PT STG, Time to Achieve 3 days  -DM      Transfer Training PT STG, Activity Type bed to chair /chair to bed  -DM      Transfer Training PT STG, [place] Level dependent (less than 25% patient effort);2 person assist  required   stable VS  -DM      Transfer Training PT STG, Assist Device other (see comments)   [facility]  -DM      Patient Education PT STG    Patient Education PT STG, Date Established 08/21/17  -DM      Patient Education PT STG, Time to Achieve 3 days  -DM      Patient Education PT STG, Education Type written program;HEP;benefits of activity   ROM exer  -DM      Patient Education PT STG, Education Understanding demonstrate adequately;verbalize understanding  -DM        User Key  (r) = Recorded By, (t) = Taken By, (c) = Cosigned By    Initials Name Provider Type    MARY Morin, PT Physical Therapist                Outcome Measures       08/21/17 1015 08/21/17 1002       How much help from another person do you currently need...    Turning from your back to your side while in flat bed without using bedrails? 2  -DM      Moving from lying on back to sitting on the side of a flat bed without bedrails? 1  -DM      Moving to and from a bed to a chair (including a wheelchair)? 1  -DM      Standing up from a chair using your arms (e.g., wheelchair, bedside chair)? 1  -DM      Climbing 3-5 steps with a railing? 1  -DM      To walk in hospital room? 1  -DM      AM-PAC 6 Clicks Score 7  -DM      How much help from another is currently needed...    Putting on and taking off regular lower body clothing?  1  -AR     Bathing (including washing, rinsing, and drying)  1  -AR     Toileting (which includes using toilet bed pan or urinal)  1  -AR     Putting on and taking off regular upper body clothing  2  -AR     Taking care of personal grooming (such as brushing teeth)  3  -AR     Eating meals  3  -AR     Score  11  -AR     Functional Assessment    Outcome Measure Options AM-PAC 6 Clicks Basic Mobility (PT)  -DM AM-PAC 6 Clicks Daily Activity (OT)  -AR       User Key  (r) = Recorded By, (t) = Taken By, (c) = Cosigned By    Initials Name Provider Type    AR Collins, OT Occupational Therapist    MARY Cantu  ILIA Morin, PT Physical Therapist           Time Calculation:         PT Charges       08/21/17 1120          Time Calculation    Start Time 1015  -DM      PT Received On 08/21/17  -DM      PT Goal Re-Cert Due Date 08/31/17  -DM      Time Calculation- PT    Total Timed Code Minutes- PT 35 minute(s)  -DM        User Key  (r) = Recorded By, (t) = Taken By, (c) = Cosigned By    Initials Name Provider Type    DM Alondra Morin, PT Physical Therapist          Therapy Charges for Today     Code Description Service Date Service Provider Modifiers Qty    98059328820 HC PT EVAL MOD COMPLEXITY 2 8/21/2017 Alondra Morin, PT GP 1    62509443345 HC PT THER PROC EA 15 MIN 8/21/2017 Alondra Morin, PT GP 2          PT G-Codes  Outcome Measure Options: AM-PAC 6 Clicks Basic Mobility (PT)      Alondra Morin, PT  8/21/2017

## 2017-08-21 NOTE — PROGRESS NOTES
Continued Stay Note  [facility]     Patient Name: Calderon Scott  MRN: [id number]  Today's Date: 8/21/2017    Admit Date: 8/19/2017          Discharge Plan       08/21/17 1317    Case Management/Social Work Plan    Plan Home    Patient/Family In Agreement With Plan yes    Additional Comments Per BAL Orellana, patient should be medically ready for discharge later today. Resume home health orders obtained and faxed to [facility] at [phone]. Ambulance scheduled with [facility] for today at 1800. PCS is on the chart. CM will continue to follow.       08/21/17 1159    Case Management/Social Work Plan    Plan Home    Patient/Family In Agreement With Plan yes    Additional Comments Met with patient in the room to initiate discharge planning. Patient lives with his granddaughter and her family in a home in [place]. Patient is dependent with ADLs except eating, and granddaughter and her  provide his care. Patient is followed by [facility] for skilled nurisng, PT, and OT and will need an order to resume home health at discharge. Patient is bedridden and will require ambulance transportation. His goal is to return home with assistance from family and with home health. CM will continue to follow.       08/21/17 1152    Case Management/Social Work Plan    Plan Home    Patient/Family In Agreement With Plan yes    Additional Comments Met with patient in the room to initiate discharge planning. Patient lives with his granddaughter and her family in a home in [place]. He is dependent with ADLs but does feed himself. His granddaughter and her  provide his care. Patient is bedridden and will require ambulance transportation at discharge.               Discharge Codes     None        Expected Discharge Date and Time     Expected Discharge Date Expected Discharge Time    Aug 22, 2017             Ashley Roa

## 2017-08-21 NOTE — PROGRESS NOTES
Discharge Planning Assessment  [facility]     Patient Name: Calderon Scott  MRN: [id number]  Today's Date: 8/21/2017    Admit Date: 8/19/2017          Discharge Needs Assessment       08/21/17 1153    Discharge Needs Assessment    Outpatient/Agency/Support Group Needs homecare agency (specify level of care)   Home health for skilled nursing, PT, OT for venous insufficiency, wound to R ankle, hypertension, a-fib, spinal stenosis, urinary retention    Community Agency Name(S) [facility], . [phone], fax [phone]      08/21/17 1145    Living Environment    Lives With other (see comments)   Lives with granddaughter, her , and their four children    Living Arrangements house    Home Accessibility no concerns    Type of Financial/Environmental Concern none    Transportation Available ambulance    Living Environment    Provides Primary Care For no one, unable/limited ability to care for self    Primary Care Provided By other (see comments)   granddaughterNya, and her , Geoffrey    Quality Of Family Relationships supportive;helpful;involved    Able to Return to Prior Living Arrangements yes    Discharge Needs Assessment    Concerns To Be Addressed discharge planning concerns    Readmission Within The Last 30 Days previous discharge plan unsuccessful   Readmitted for UTI    Equipment Currently Used at Home lift device;hospital bed;wheelchair    Discharge Disposition still a patient    Discharge Contact Information if Applicable Nya Sylvain, [place], [phone]            Discharge Plan       08/21/17 1152    Case Management/Social Work Plan    Plan Home    Patient/Family In Agreement With Plan yes    Additional Comments Met with patient in the room to initiate discharge planning. Patient lives with his granddaughter and her family in a home in [place]. Patient is dependent with ADLs except eating, and granddaughter and her  provide his care.  Patient is followed by [facility] for skilled nurisng, PT, and OT and will need an order to resume home health at discharge. Patient is bedridden and will require ambulance transportation. His goal is to return home with assistance from family and with home health. CM will continue to follow.             Discharge Placement     No information found        Expected Discharge Date and Time     Expected Discharge Date Expected Discharge Time    Aug 22, 2017               Demographic Summary       08/21/17 1148    Referral Information    Admission Type inpatient    Referral Source admission list    Reason For Consult discharge planning    Record Reviewed history and physical;medical record    Contact Information    Permission Granted to Share Information With ;family/designee   son, Roderick Scott, or granddaughter, Nya Narayan    Primary Care Physician Information    Name Sandra King            Functional Status       08/21/17 1149    Functional Status Prior    Ambulation 4-->completely dependent    Transferring 4-->completely dependent    Toileting 4-->completely dependent    Bathing 4-->completely dependent    Dressing 4-->completely dependent    Eating 0-->independent    Communication 0-->understands/communicates without difficulty    Swallowing 0-->swallows foods/liquids without difficulty    Employment/Financial    Employment/Finance Comments Medical and rx coverage through [employer]            Psychosocial     None            Abuse/Neglect     None            Legal     None            Substance Abuse     None            Patient Forms     None          Ashley Roa

## 2017-08-21 NOTE — PLAN OF CARE
Problem: Fall Risk (Adult)  Goal: Identify Related Risk Factors and Signs and Symptoms  Outcome: Ongoing (interventions implemented as appropriate)    08/21/17 0430   Fall Risk   Fall Risk: Related Risk Factors environment unfamiliar;sensory deficits;bladder function altered;gait/mobility problems;fear of falling       Goal: Absence of Falls  Outcome: Ongoing (interventions implemented as appropriate)    08/21/17 0430   Fall Risk (Adult)   Absence of Falls making progress toward outcome

## 2017-08-21 NOTE — PLAN OF CARE
Problem: Patient Care Overview (Adult)  Goal: Plan of Care Review  Outcome: Ongoing (interventions implemented as appropriate)    08/21/17 1259   Coping/Psychosocial Response Interventions   Plan Of Care Reviewed With patient   Patient Care Overview   Progress progress toward functional goals as expected   Outcome Evaluation   Outcome Summary/Follow up Plan Pt. demonstrating multiple prior limitations along with current deficits. Pt. priorly bed ridden so goals to focus on self care grooming and rolling in bed to assist caregiver with strengthing exer. May benefit HH on discharge to work with pt. and caregiver.         Problem: Inpatient Occupational Therapy  Goal: Bed Mobility Goal LTG- OT  Outcome: Ongoing (interventions implemented as appropriate)    08/21/17 1259   Bed Mobility OT LTG   Bed Mobility OT LTG, Date Established 08/21/17   Bed Mobility OT LTG, Time to Achieve 1 wk   Bed Mobility OT LTG, Activity Type roll left/roll right   Bed Mobility OT LTG, [place] Level maximum assist (25% patient effort)  (one person assist)   Bed Mobility OT LTG, Assist Device bed rails   Bed Mobility OT LTG, Outcome goal ongoing       Goal: Strength Goal LTG- OT  Outcome: Ongoing (interventions implemented as appropriate)    08/21/17 1259   Strength OT LTG   Strength Goal OT LTG, Date Established 08/21/17   Strength Goal OT LTG, Time to Achieve 1 wk   Strength Goal OT LTG, Measure to Achieve Pt,. complete UE tband exercises 10-12 reps daily to increase bed mobility.   Strength Goal OT LTG, Outcome goal ongoing       Goal: Patient Education Goal LTG- OT  Outcome: Ongoing (interventions implemented as appropriate)    08/21/17 1259   Patient Education OT LTG   Patient Education OT LTG, Date Established 08/28/17   Patient Education OT LTG, Time to Achieve 1 wk   Patient Education OT LTG, Education Type written program;HEP   Patient Education OT LTG, Education Understanding demonstrates adequately   Patient Education OT LTG  Outcome goal ongoing       Goal: Grooming Goal LTG- OT  Outcome: Ongoing (interventions implemented as appropriate)    08/21/17 0069   Grooming OT LTG   Grooming Goal OT LTG, Date Established 08/21/17   Grooming Goal OT LTG, Time to Achieve 1 wk   Grooming Goal OT LTG, Activity Type setup grooming   Grooming Goal OT LTG, [place] Level set up required   Grooming Goal OT LTG, Position sitting in chair;reclined, sitting in bed   Grooming Goal OT LTG, Outcome goal ongoing

## 2017-08-21 NOTE — THERAPY EVALUATION
Acute Care - Occupational Therapy Initial Evaluation  [facility]     Patient Name: Calderon Scott  : 1932  MRN: [id number]  Today's Date: 2017  Onset of Illness/Injury or Date of Surgery Date: 17  Date of Referral to OT: 17  Referring Physician: BAL Soria    Admit Date: 2017       ICD-10-CM ICD-9-CM   1. Urinary tract infection associated with indwelling urethral catheter, initial encounter T83.511A 996.64    N39.0 599.0   2. Problem with Oneal catheter, initial encounter T83.9XXA 996.76   3. Impaired mobility and ADLs Z74.09 799.89     Patient Active Problem List   Diagnosis   • Essential hypertension   • Congestive heart failure   • Malignant melanoma of torso excluding breast   • Numbness and tingling of hand   • Deep vein thrombosis (DVT) of proximal vein of right lower extremity   • Pre-ulcerative corn or callous   • Chronic a-fib   • Urinary tract infection associated with catheterization of urinary tract   • Lower paraplegia   • Hematuria   • Chronic indwelling Oneal catheter   • Urinary tract infection associated with indwelling urethral catheter   • Leakage from urinary catheter     Past Medical History:   Diagnosis Date   • A-fib    • Arthritis    • Back disorder     patient states he cannot walk or stand due to back pain since 2017   • Cancer    • CHF (congestive heart failure)    • Chronic indwelling Oneal catheter 2017   • Deep vein thrombosis    • Fracture     left wrist, left hand > 10 years ago    • Hypertension      Past Surgical History:   Procedure Laterality Date   • KNEE SURGERY     • SKIN BIOPSY     • SKIN CANCER EXCISION      left ear removed, multiple areas removed    • SKIN SURGERY      basal cell and melanoma   • VASECTOMY            OT ASSESSMENT FLOWSHEET (last 72 hours)      OT Evaluation       17 1149 17 1002 17 0600 17 0400 17 0200    Rehab Evaluation    Document Type  evaluation  -AR       Subjective  Information  agree to therapy;complains of;pain  -AR       Patient Effort, Rehab Treatment  adequate  -AR       General Information    Patient Profile Review  yes  -AR       Onset of Illness/Injury or Date of Surgery Date  08/19/17  -AR       Referring Physician  BAL Soria  -AR       General Observations  Pt. supine in bed with no family present.  -AR       Pertinent History Of Current Problem  Pt. bedridden since 2/17 due to spinal stenosis and multiple herniated disc lumbar area.  Pt. with indwelling catheter and with multiple recent infections.  Changed every 4 weeks.  Post  nurse changing pt. with pain and some bleeding and leakage.  To ED.  Pt. found with UTI vs bacterial colonization due to indwelling cath.  -AR       Precautions/Limitations  fall precautions    indwelling cath, bedridden, up with lift only, contact pre  -AR       Prior Level of Function  min assist:;feeding;grooming;max assist:;dressing;bathing;dependent:;bed mobility;gait;transfer;home management;driving;shopping  -AR       Equipment Currently Used at Home lift device;hospital bed;wheelchair  - lift device;hospital bed;wheelchair;oxygen;ramp  -AR       Plans/Goals Discussed With  patient;agreed upon  -AR       Risks Reviewed  patient:;increased discomfort;change in vital signs;lines disloged  -AR       Benefits Reviewed  patient:;improve function;increase independence;increase strength;increase knowledge  -AR       Barriers to Rehab  previous functional deficit;physical barrier  -AR       Living Environment    Lives With other (see comments)   Lives with granddaughter, her , and their four children  -ML --   lives with grandtr, her  and their 4 children.  -AR       Living Arrangements house  -ML house  -AR       Home Accessibility no concerns  -ML no concerns  -AR       Type of Financial/Environmental Concern none  -ML        Transportation Available ambulance  -ML        Clinical Impression    Date of Referral to OT   08/20/17  -AR       OT Diagnosis  Impaired ADL and bed mobility due to UTI vs bacterial colonization.  -AR       Patient/Family Goals Statement  Pt. wants to be able to return home.  -AR       Criteria for Skilled Therapeutic Interventions Met  yes;treatment indicated  -AR       Rehab Potential  good, to achieve stated therapy goals  -AR       Therapy Frequency  3 times/wk  -AR       Anticipated Discharge Disposition  home with home health;home with 24/7 care  -AR       Functional Level Prior    Ambulation 4-->completely dependent  -ML        Transferring 4-->completely dependent  -ML        Toileting 4-->completely dependent  -ML        Bathing 4-->completely dependent  -ML        Dressing 4-->completely dependent  -ML        Eating 0-->independent  -ML        Communication 0-->understands/communicates without difficulty  -ML        Swallowing 0-->swallows foods/liquids without difficulty  -ML        Vital Signs    Post Systolic BP Rehab  158  -AR       Post Treatment Diastolic BP  88  -AR       Posttreatment Heart Rate (beats/min)  58  -AR       Pre Patient Position  Supine  -AR       Intra Patient Position  Supine  -AR       Post Patient Position  Supine  -AR       Pain Assessment    Pain Assessment  0-10  -AR       Pain Score  7  -AR       Post Pain Score  5  -AR       Pain Type  Chronic pain  -AR       Pain Location  Knee  -AR       Pain Orientation  Right;Left  -AR       Pain Intervention(s)  --   ROM  -AR       Response to Interventions  improved  -AR       Vision Assessment/Intervention    Visual Impairment  WFL with corrective lenses  -AR       Cognitive Assessment/Intervention    Current Cognitive/Communication Assessment  functional  -AR       Orientation Status  oriented x 4  -AR       Follows Commands/Answers Questions  100% of the time;able to follow single-step instructions  -AR       Personal Safety  WNL/WFL  -AR       Personal Safety Interventions  supervised activity  -AR       ROM (Range of  Motion)    General ROM  no range of motion deficits identified;upper extremity range of motion deficits identified  -AR       MMT (Manual Muscle Testing)    General MMT Assessment  upper extremity strength deficits identified  -AR       General MMT Assessment Detail  L shoulder 4/5  -AR       Muscle Tone Assessment    Muscle Tone Assessment   Bilateral Lower Extremities  -LD Bilateral Lower Extremities  -LD Bilateral Lower Extremities  -LD    Bilateral Lower Extremities Muscle Tone Assessment   moderately decreased tone  -LD moderately decreased tone  -LD moderately decreased tone  -LD    Bed Mobility, Assessment/Treatment    Bed Mobility, Roll Left, [place]  maximum assist (25% patient effort);2 person assist required;verbal cues required;nonverbal cues required (demo/gesture)  -AR       Bed Mobility, Roll Right, [place]  maximum assist (25% patient effort);2 person assist required;verbal cues required;nonverbal cues required (demo/gesture)  -AR       Bed Mobility, Impairments  strength decreased;decreased flexibility;ROM decreased;pain  -AR       Bed Mobility, Comment  Pt. max of 2 with draw sheet to roll. Pt. using bed rail  -AR       Transfer Assessment/Treatment    Transfer, Comment  inappropr  -AR       Upper Body Dressing Assessment/Training    UB Dressing Assess/Train, Clothing Type  doffing:;donning:;hospital gown  -AR       UB Dressing Assess/Train, Position  supine  -AR       UB Dressing Assess/Train, [place]  maximum assist (25% patient effort)  -AR       Toileting Assessment/Training    Toileting Assess/Train, Position  supine  -AR       Toileting Assess/Train, Indepen Level  dependent (less than 25% patient effort);2 person assist required  -AR       Toileting Assess/Train, Impairments  strength decreased;impaired balance;decreased flexibility;ROM decreased  -AR       Toileting Assess/Train, Comment  Pt. wet and gown wet.  Pt. cleaned up.  Pt. assisted small amount to roll only.  -AR        Therapy Exercises    BUE Resistance  theraband   10 reps x 6 exer with cues.  -AR       Sensory Assessment/Intervention    Sensory Impairment  numbness  -AR       Light Touch  RUE   per pt. with carpal tunnel and intermittent numbness.  -AR       Positioning and Restraints    Pre-Treatment Position  in bed  -AR       Post Treatment Position  bed  -AR       In Bed  supine;call light within reach;encouraged to call for assist   waffle boots off due to endentions legs, nurse notified.  -AR         08/21/17 0000 08/20/17 2200 08/20/17 2000 08/20/17 1754 08/20/17 0825    General Information    Equipment Currently Used at Home    hospital bed;wheelchair;oxygen  -FATOU     Living Environment    Transportation Available    ambulance  -     Muscle Tone Assessment    Muscle Tone Assessment Bilateral Lower Extremities  -LD Bilateral Lower Extremities  -LD Bilateral Lower Extremities  -LD  Bilateral Lower Extremities  -KS    Bilateral Lower Extremities Muscle Tone Assessment moderately decreased tone  -LD moderately decreased tone  -LD moderately decreased tone  -LD  moderately decreased tone  -KS      08/20/17 0205                General Information    Equipment Currently Used at Home hospital bed;wheelchair;lift device  -MW        Living Environment    Lives With child(amanda), adult  -MW        Living Arrangements house  -MW        Home Accessibility no concerns  -MW        Stair Railings at Home none  -MW        Type of Financial/Environmental Concern none  -MW        Transportation Available car;family or friend will provide  -MW        Functional Level Prior    Ambulation 4-->completely dependent  -MW        Transferring 4-->completely dependent  -MW        Toileting 4-->completely dependent  -MW        Bathing 4-->completely dependent  -MW        Dressing 4-->completely dependent  -MW        Eating 2-->assistive person  -MW        Communication 0-->understands/communicates without difficulty  -MW        Swallowing  0-->swallows foods/liquids without difficulty  -MW          User Key  (r) = Recorded By, (t) = Taken By, (c) = Cosigned By    Initials Name Effective Dates    AR Collins OT 06/22/15 -     ML Ashley Rodriguezsary 05/02/16 -     MW Jose Antonio Pinzon, RN 09/06/16 -     FATOU Maxwell, LEROY 11/10/16 -     CHANEL King, LEROY 01/20/17 -     LORRAINE Woods RN 07/25/17 -            Occupational Therapy Education     Title: PT OT SLP Therapies (Done)     Topic: Occupational Therapy (Done)     Point: ADL training (Done)    Description: Instruct learner(s) on proper safety adaptation and remediation techniques during self care or transfers.   Instruct in proper use of assistive devices.    Learning Progress Summary    Learner Readiness Method Response Comment Documented by Status   Patient Acceptance E,D NR,VU UE tband, rolling, benefits activity  08/21/17 1258 Done               Point: Home exercise program (Done)    Description: Instruct learner(s) on appropriate technique for monitoring, assisting and/or progressing therapeutic exercises/activities.    Learning Progress Summary    Learner Readiness Method Response Comment Documented by Status   Patient Acceptance E,D NR,VU UE tband, rolling, benefits activity  08/21/17 1258 Done                      User Key     Initials Effective Dates Name Provider Type Discipline     06/22/15 -  Lurdes Collins OT Occupational Therapist OT                  OT Recommendation and Plan  Anticipated Discharge Disposition: home with home health, home with 24/7 care  Therapy Frequency: 3 times/wk  Plan of Care Review  Plan Of Care Reviewed With: patient  Progress: progress toward functional goals as expected  Outcome Summary/Follow up Plan: Pt. demonstrating multiple prior limitations along with current deficits.  Pt. priorly bed ridden so goals to focus on self care grooming and rolling in bed to assist caregiver with strengthing  exer.  May benefit HH on  discharge to work with pt. and caregiver.          OT Goals       08/21/17 1259          Bed Mobility OT LTG    Bed Mobility OT LTG, Date Established 08/21/17  -AR      Bed Mobility OT LTG, Time to Achieve 1 wk  -AR      Bed Mobility OT LTG, Activity Type roll left/roll right  -AR      Bed Mobility OT LTG, [place] Level maximum assist (25% patient effort)   one person assist  -AR      Bed Mobility OT LTG, Assist Device bed rails  -AR      Bed Mobility OT LTG, Outcome goal ongoing  -AR      Strength OT LTG    Strength Goal OT LTG, Date Established 08/21/17  -AR      Strength Goal OT LTG, Time to Achieve 1 wk  -AR      Strength Goal OT LTG, Measure to Achieve Pt,. complete UE tband exercises 10-12 reps daily to increase bed mobility.  -AR      Strength Goal OT LTG, Outcome goal ongoing  -AR      Patient Education OT LTG    Patient Education OT LTG, Date Established 08/28/17  -AR      Patient Education OT LTG, Time to Achieve 1 wk  -AR      Patient Education OT LTG, Education Type written program;HEP  -AR      Patient Education OT LTG, Education Understanding demonstrates adequately  -AR      Patient Education OT LTG Outcome goal ongoing  -AR      Grooming OT LTG    Grooming Goal OT LTG, Date Established 08/21/17  -AR      Grooming Goal OT LTG, Time to Achieve 1 wk  -AR      Grooming Goal OT LTG, Activity Type setup grooming  -AR      Grooming Goal OT LTG, [place] Level set up required  -AR      Grooming Goal OT LTG, Position sitting in chair;reclined, sitting in bed  -AR      Grooming Goal OT LTG, Outcome goal ongoing  -AR        User Key  (r) = Recorded By, (t) = Taken By, (c) = Cosigned By    Initials Name Provider Type    AR Collins, OT Occupational Therapist                Outcome Measures       08/21/17 1002          How much help from another is currently needed...    Putting on and taking off regular lower body clothing? 1  -AR      Bathing (including washing, rinsing, and drying) 1  -AR       Toileting (which includes using toilet bed pan or urinal) 1  -AR      Putting on and taking off regular upper body clothing 2  -AR      Taking care of personal grooming (such as brushing teeth) 3  -AR      Eating meals 3  -AR      Score 11  -RA      Functional Assessment    Outcome Measure Options AM-PAC 6 Clicks Daily Activity (OT)  -AR        User Key  (r) = Recorded By, (t) = Taken By, (c) = Cosigned By    Initials Name Provider Type    AR Collins OT Occupational Therapist          Time Calculation:   OT Start Time: 1002    Therapy Charges for Today     Code Description Service Date Service Provider Modifiers Qty    78498744130  OT EVAL MOD COMPLEXITY 3 8/21/2017 Lurdes Collins OT GO 1    [id number]  OT THERAPEUTIC ACT EA 15 MIN 8/21/2017 Lurdes Collins OT GO 1               Lurdes Collisn OT  8/21/2017

## 2017-08-21 NOTE — PLAN OF CARE
Problem: Patient Care Overview (Adult)  Goal: Plan of Care Review  Outcome: Ongoing (interventions implemented as appropriate)    08/21/17 1120   Coping/Psychosocial Response Interventions   Plan Of Care Reviewed With patient   Patient Care Overview   Progress progress toward functional goals is gradual   Outcome Evaluation   Outcome Summary/Follow up Plan Presents w/ evolving sympt, incl. signif kashif. knee/leg pain at rest & w/mvmt, elev BP, dillon, cellulitis,allan.edema, & weakness/impaired bed mobil d/t UTI; able to zulema. mult trials rolling/repositioning to HOB, & ROM Exer to LE'S, but c/o fatigue; will instruct grddtr/pt. in ROM HEP;plans to resume HHPT/OT/NSG at d/c          Problem: Inpatient Physical Therapy  Goal: Bed Mobility Goal STG- PT  Outcome: Ongoing (interventions implemented as appropriate)    08/21/17 1120   Bed Mobility PT STG   Bed Mobility PT STG, Date Established 08/21/17   Bed Mobility PT STG, Time to Achieve 3 days   Bed Mobility PT STG, Activity Type roll left/roll right;scoot/bridge   Bed Mobility PT STG, [place] Level maximum assist (25% patient effort);2 person assist required   Transfer Training Goal, Assist Device (DRAW SHEET)       Goal: Transfer Training Goal 1 STG- PT  Outcome: Ongoing (interventions implemented as appropriate)    08/21/17 1120   Transfer Training PT STG   Transfer Training PT STG, Date Established 08/21/17   Transfer Training PT STG, Time to Achieve 3 days   Transfer Training PT STG, Activity Type bed to chair /chair to bed   Transfer Training PT STG, [place] Level dependent (less than 25% patient effort);2 person assist required  (stable VS)   Transfer Training PT STG, Assist Device other (see comments)  (w/ mech lift bed to chair)       Goal: Patient Education Goal STG- PT  Outcome: Ongoing (interventions implemented as appropriate)    08/21/17 1120   Patient Education PT STG   Patient Education PT STG, Date Established 08/21/17   Patient Education PT STG,  Time to Achieve 3 days   Patient Education PT STG, Education Type written program;HEP;benefits of activity  (ROM exer)   Patient Education PT STG, Education Understanding demonstrate adequately;verbalize understanding

## 2017-08-21 NOTE — PROGRESS NOTES
[facility] Progress Note    Admission Date: 2017    Calderon Scott  1932  [id number]    Date: 2017    Meds:    IV Anti-Infectives     None          CC: pyuria/ chronic owusu       SUBJECTIVE: 17: Patient is a 84 y.o.  Yr old male PT OF DR WOLF, with history of chronic Owusu catheter, managed at [facility] with chronic debility.  He was seen by Dr. Wolf with last inpatient visit approximately  associated with abnormal urine culture from  with pseudomonas aeruginosa.  He received some cefepime but unclear exact duration.  He reports having Owusu catheter changed by his home health nurse on .  He cannot recall the exact procedure but felt as if the catheter was tugged on, did not function appropriately and he subsequently had urination around the catheter associated with dysuria.  He came to the emergency room at [facility] with a Owusu catheter was replaced again.  Urinalysis was abnormal and cefepime started empirically.  17:  Was asymptomatic with owusu leaking, no fever, pelvic pain/pressure.  Owusu dc'd last pm.       ROS:  No f/c/s. No n/v/d. No rash. No new ADR to Abx.     PE:   Vital Signs  Temp (24hrs), Av.3 °F (36.3 °C), Min:96.8 °F (36 °C), Max:98.6 °F (37 °C)    Temp  Min: 96.8 °F (36 °C)  Max: 98.6 °F (37 °C)  BP  Min: 132/78  Max: 165/98  Pulse  Min: 48  Max: 86  Resp  Min: 18  Max: 18  SpO2  Min: 98 %  Max: 98 %    GENERAL: Awake and alert, in no acute distress.   HEENT:  No conjunctival injection. No icterus. Oropharynx clear without evidence of thrush or exudate.  NECK: Supple, no JVD     HEART: RRR; No murmur, rubs, gallops.   LUNGS: Clear to auscultation bilaterally without wheezing, rales, rhonchi. Normal respiratory effort. Nonlabored. No dullness.  ABDOMEN: Soft, nontender, nondistended. Positive bowel sounds. No rebound or guarding. NO mass or HSM.  EXT:  No cyanosis, clubbing or edema. No cord.  : Genitalia generally  unremarkable.  Without Oneal catheter.- depends   SKIN: Warm and dry without cutaneous eruptions on Inspection/palpation.    NEURO: Alert and oriented x 3, Motor 5/5 bilaterally    Laboratory Data      Results from last 7 days  Lab Units 08/20/17  0911 08/19/17  2221   WBC 10*3/mm3 5.24 6.37   HEMOGLOBIN g/dL 12.4* 13.1   HEMATOCRIT % 39.6 41.6   PLATELETS 10*3/mm3 144* 161       Results from last 7 days  Lab Units 08/20/17  0911   SODIUM mmol/L 137   POTASSIUM mmol/L 3.8   CHLORIDE mmol/L 105   CO2 mmol/L 30.0   BUN mg/dL 21   CREATININE mg/dL 0.60   GLUCOSE mg/dL 136*   CALCIUM mg/dL 8.2*       Results from last 7 days  Lab Units 08/19/17  2221   ALK PHOS U/L 106*   BILIRUBIN mg/dL 0.4   ALT (SGPT) U/L 18   AST (SGOT) U/L 23           UA  TNTC RBCs, 13-20 WBCs, urine culture pending               Estimated Creatinine Clearance: 95.4 mL/min (by C-G formula based on Cr of 0.6).    Microbiology:                    Urine Culture   Date Value Ref Range Status   08/19/2017 Culture in progress  Preliminary          Radiology:  Imaging Results (last 72 hours)     ** No results found for the last 72 hours. **          I personally read the radiographic studies     IMPRESSION:  1.  Pyuria/hematuria-without fever or leukocytosis.  He is now improved and stable with his Oneal catheter out.  I will discontinue his antibiotic therapy.  2.  Urinary incontinence-urinary incontinence is not an indication for a chronic indwelling Oneal catheter and he should simply use an adult diaper.  3.  Dementia     RECOMMENDATIONS;  1.  D/C Cefepime   2.  The Oneal catheter out  3.  Discharge soon      Dr. Wolf has obtained the history, performed the physical exam and formulated the above treatment plan.     Jacky Wolf MD  8/21/2017  1:16 PM

## 2017-08-22 ENCOUNTER — TRANSITIONAL CARE MANAGEMENT TELEPHONE ENCOUNTER (OUTPATIENT)
Dept: FAMILY MEDICINE CLINIC | Facility: CLINIC | Age: 82
End: 2017-08-22

## 2017-08-22 ENCOUNTER — TELEPHONE (OUTPATIENT)
Dept: FAMILY MEDICINE CLINIC | Facility: CLINIC | Age: 82
End: 2017-08-22

## 2017-08-22 NOTE — TELEPHONE ENCOUNTER
----- Message from Alicia Huffman sent at 8/22/2017 11:00 AM EDT -----  Contact: [facility] CALLING ABOUT RESUMING CARE FOR THIS PATIENT.    RENUKA  [phone]

## 2017-08-23 ENCOUNTER — TELEPHONE (OUTPATIENT)
Dept: FAMILY MEDICINE CLINIC | Facility: CLINIC | Age: 82
End: 2017-08-23

## 2017-08-23 LAB — BACTERIA SPEC AEROBE CULT: ABNORMAL

## 2017-08-23 NOTE — TELEPHONE ENCOUNTER
Received a phone call from [facility]anner regarding Calderon Scott elevation of /108 and 184/94. He has significant swelling of his lower extremities. She says he had been taken off his Lisinopril 10 mg daily and Lasix 20mg BID and KCL 10 meq BID. She is wanting to know if I can discuss the stopping of these medications with his cardiologist Dr Le. He had an office visit last week and this was not addressed at that time.   I called Dr Le office and talked with his nurse Janelle who says according to the most recent office visit he was supposed to continue on all those medications (they are still active on his medication lists). She will check with Dr Le in the morning and fax over the most recent office notes to [facility]. I contacted Marie at [facility] to let him know that the Lisinopril, Lasix and KCL should be continued as ordered previously. He will contact the family to let them know. colette

## 2017-08-25 ENCOUNTER — OFFICE VISIT (OUTPATIENT)
Dept: FAMILY MEDICINE CLINIC | Facility: CLINIC | Age: 82
End: 2017-08-25

## 2017-08-25 VITALS
HEART RATE: 68 BPM | SYSTOLIC BLOOD PRESSURE: 130 MMHG | HEIGHT: 74 IN | TEMPERATURE: 98.2 F | DIASTOLIC BLOOD PRESSURE: 80 MMHG

## 2017-08-25 DIAGNOSIS — M48.00 SPINAL STENOSIS, UNSPECIFIED SPINAL REGION: ICD-10-CM

## 2017-08-25 DIAGNOSIS — N39.0 URINARY TRACT INFECTION ASSOCIATED WITH INDWELLING URETHRAL CATHETER, SUBSEQUENT ENCOUNTER: Primary | ICD-10-CM

## 2017-08-25 DIAGNOSIS — M79.605 LEFT LEG PAIN: ICD-10-CM

## 2017-08-25 DIAGNOSIS — Z97.8 CHRONIC INDWELLING FOLEY CATHETER: ICD-10-CM

## 2017-08-25 DIAGNOSIS — T83.511D URINARY TRACT INFECTION ASSOCIATED WITH INDWELLING URETHRAL CATHETER, SUBSEQUENT ENCOUNTER: Primary | ICD-10-CM

## 2017-08-25 DIAGNOSIS — G82.20 LOWER PARAPLEGIA (HCC): ICD-10-CM

## 2017-08-25 PROCEDURE — 99496 TRANSJ CARE MGMT HIGH F2F 7D: CPT | Performed by: NURSE PRACTITIONER

## 2017-08-25 RX ORDER — FUROSEMIDE 20 MG/1
TABLET ORAL
Refills: 1 | Status: ON HOLD | COMMUNITY
Start: 2017-08-06 | End: 2018-05-27

## 2017-08-25 RX ORDER — CITALOPRAM 20 MG/1
20 TABLET ORAL DAILY
Qty: 90 TABLET | Refills: 1 | Status: SHIPPED | OUTPATIENT
Start: 2017-08-25 | End: 2018-05-30 | Stop reason: SDUPTHER

## 2017-08-25 RX ORDER — POTASSIUM CHLORIDE 750 MG/1
TABLET, FILM COATED, EXTENDED RELEASE ORAL
Refills: 1 | Status: ON HOLD | COMMUNITY
Start: 2017-08-12 | End: 2018-05-27

## 2017-08-25 RX ORDER — LISINOPRIL 10 MG/1
10 TABLET ORAL DAILY
Status: ON HOLD | COMMUNITY
End: 2018-05-27

## 2017-08-25 RX ORDER — OXYCODONE AND ACETAMINOPHEN 10; 325 MG/1; MG/1
TABLET ORAL
Qty: 18 TABLET | Refills: 0 | Status: SHIPPED | OUTPATIENT
Start: 2017-08-25 | End: 2018-05-28 | Stop reason: HOSPADM

## 2017-08-25 RX ORDER — FLUOROURACIL 50 MG/G
CREAM TOPICAL 2 TIMES DAILY
COMMUNITY

## 2017-08-26 NOTE — PROGRESS NOTES
Transitional Care Follow Up Visit  Subjective     Calderon Scott is a 84 y.o. male who presents for a transitional care management visit.  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 Pt is having skin breakdown in his buttocks. H e is using moisture barrier cream to try to prevent skin breakdown.  HH is supposed to come to his house to re-establish care on Saturday  Pt is accompanied by his grand daughter who is care giver. She is concerned about leaving urine catheter out, discussed condom catheter or reinsertion of indwelling catheter since is is still having mobility issues    Pt was discharged home  Within 48 business hours after discharge our office contacted him via telephone to coordinate his care and needs.      I reviewed and discussed the details of that call along with the discharge summary, hospital problems, inpatient lab results, inpatient diagnostic studies, and consultation reports with Calderon.    Date of TCM Phone Call 8/23/2017   [facility]   Date of Admission 8/19/2017   Date of Discharge 8/21/2017   Risk for Readmission (LACE Score) 12   Discharge Disposition Home-Health Care [facility]       History of Present Illness   Course During Hospital Stay:       The following portions of the patient's history were reviewed and updated as appropriate: allergies, current medications, past family history, past medical history, past social history, past surgical history and problem list.    Review of Systems   Constitutional: Negative.    HENT: Negative.    Eyes: Negative.    Respiratory: Negative.    Cardiovascular: Positive for leg swelling. Negative for chest pain.   Gastrointestinal: Negative.     Endocrine: Negative.    Genitourinary: Positive for scrotal swelling and urgency (incontenence). Negative for hematuria.   Musculoskeletal: Positive for arthralgias and back pain.   Skin: Positive for color change and wound.   Allergic/Immunologic: Negative.    Neurological: Negative.    Hematological: Negative.    Psychiatric/Behavioral: Negative.  Negative for agitation, confusion and sleep disturbance.       Objective   Physical Exam   Constitutional: He is oriented to person, place, and time. He appears well-developed and well-nourished. No distress.   Cardiovascular: Normal rate, regular rhythm and normal heart sounds.    Pulmonary/Chest: Effort normal and breath sounds normal.   Abdominal: Soft. There is no tenderness.   Musculoskeletal:   Bilateral lower extremities wrapped with ACE wraps. Pt is seated in wheelchair   Neurological: He is alert and oriented to person, place, and time.   Skin: Skin is warm and dry.   Dry dressing intact on top of head frontal area.    Psychiatric: He has a normal mood and affect. His behavior is normal. Judgment and thought content normal.   Nursing note and vitals reviewed.      Assessment/Plan   Calderon was seen today for tamara / discharge 8-19-17 for uti.    Diagnoses and all orders for this visit:    Urinary tract infection associated with indwelling urethral catheter, subsequent encounter  -     Ambulatory Referral to Urology    Spinal stenosis, unspecified spinal region  -     oxyCODONE-acetaminophen (PERCOCET)  MG per tablet; 1 by mouth every 4-6 hours as needed for pain    Chronic indwelling Oneal catheter  -     Ambulatory Referral to Urology    Lower paraplegia    Left leg pain    Other orders  -     citalopram (CELEXA) 20 MG tablet; Take 1 tablet by mouth Daily.  -     Catheters (GIZMO CONDOM CATHETER) misc; 1 application Daily.    Pt also seen by Dr Donte Ribeiro briefly today    Will make referral to urology locally so pt does not have to travel to  or wait  until late September     Will write order for cuddette/condom catheter to use while pt is working on mobility and strenghtening. Discussed risk of reinsertion of indwelling catheter. Diligent attention to skin care is important  Will refill pain medication for today but pt to proceed with pain management for continued pain medication and spinal injections. Continue PT/OT with HH.

## 2017-08-30 ENCOUNTER — TELEPHONE (OUTPATIENT)
Dept: FAMILY MEDICINE CLINIC | Facility: CLINIC | Age: 82
End: 2017-08-30

## 2017-09-01 ENCOUNTER — TELEPHONE (OUTPATIENT)
Dept: FAMILY MEDICINE CLINIC | Facility: CLINIC | Age: 82
End: 2017-09-01

## 2017-09-01 NOTE — TELEPHONE ENCOUNTER
----- Message from Alicia Becker sent at 9/1/2017  4:11 PM EDT -----  Contact: [facility]  EVALUATION FOR PT WAS DONE YESTERDAY-NO FURTHER TREATMENT  IS NEEDED- INDEPENDENT WITH HOME EXERCISES     WSFYI-[phone]  [phone]-OFFICE

## 2017-09-11 ENCOUNTER — TELEPHONE (OUTPATIENT)
Dept: FAMILY MEDICINE CLINIC | Facility: CLINIC | Age: 82
End: 2017-09-11

## 2017-09-11 NOTE — TELEPHONE ENCOUNTER
----- Message from Alicia Becker sent at 9/11/2017  3:59 PM EDT -----  Contact: BRANDEE;[facility]  WAS UNABLE TO SEE THE PT ON THE FIRST AND REQUESTING A VERBAL  ORDER FOR MEDICAL SOCIAL WORKER FOR COMMUNITY RESOURCE PLANNING      [facility] -[phone]

## 2017-09-22 ENCOUNTER — TELEPHONE (OUTPATIENT)
Dept: FAMILY MEDICINE CLINIC | Facility: CLINIC | Age: 82
End: 2017-09-22

## 2017-09-22 RX ORDER — CEPHALEXIN 500 MG/1
TABLET ORAL
Qty: 20 TABLET | Refills: 0 | Status: SHIPPED | OUTPATIENT
Start: 2017-09-22 | End: 2018-01-30

## 2017-09-22 NOTE — TELEPHONE ENCOUNTER
LM for nurse to call us back.  Spoke w/ pt's granddaughter and he has had Keflex before and his PCN allergy was as an infant, not even sure about it.  Sent Rx to pharm and granddaughter say's, that the nurse is coming back on Tues anyway.

## 2017-09-22 NOTE — TELEPHONE ENCOUNTER
Please call, Ok to refill gabapentin x 1 but check to see if getting 90 day Rx. Looks like Sandra gave 90 day in June.   ok to call in #90 + 2 refills  or 90 day supply. bds

## 2017-09-22 NOTE — TELEPHONE ENCOUNTER
Please call.  If no previous allergy to cephalosporin, i.e. Keflex, then start cephalexin 500 mg 2 by mouth twice a day #20 and please recheck toes early next week to see if any improvement.    If unable to reach nurse, please call patient or family.    It appears that he is allergic to penicillin and so 10% of people allergic to penicillin could be allergic to Keflex.  However, I see where he has been on cephalosporins like cefdinir and the past.  Please make sure no prior reaction and send in medication.

## 2017-09-22 NOTE — TELEPHONE ENCOUNTER
----- Message from Alicia Huffman sent at 9/22/2017 10:40 AM EDT -----  Contact: BRANDEE CHOU THE CAREGIVER FOR THIS PATIENT CALLING FOR A REFILL:       GABAPENTIN 400 MG    [facility] [place]    THIS IS SHANNA DICKERSON PATIENT, BUT SHE SAID THE PT. IS COMPLETELY OUT.

## 2017-09-22 NOTE — TELEPHONE ENCOUNTER
Pt's granddaughter needs a letter stating that her and her  are the primary caregivers for patient. Their names are Nya ([birth year]14[birth year]85) & Rajesh (6[birth year]30-85) Yenny.

## 2017-09-22 NOTE — TELEPHONE ENCOUNTER
----- Message from Alicia Huffman sent at 9/22/2017  4:13 PM EDT -----  Contact: [facility] REPORTED ON PATIENT:    ON BOTH OF HIS FEET ON THIRD TOE THEY ARE RED. AND THIRD TOE ON RIGHT FOOT LOOKED INFECTED.  SHE CLEANED AND WRAPED TOP.   WHAT DO YOU WANT HER TO DO:    [id number] ;JOHNSON

## 2017-09-26 RX ORDER — GABAPENTIN 400 MG/1
CAPSULE ORAL
Qty: 270 CAPSULE | Refills: 1 | OUTPATIENT
Start: 2017-09-26

## 2017-09-26 NOTE — TELEPHONE ENCOUNTER
Spoke with Nya regarding letter stating that she and her  are Mr Scott's primary care givers in order to seek respite care and her  is unable to seek employment outside the home due to Mr Scott's 24 hr total care health needs.  Letter has been drafted and is ready for . [facility]

## 2017-09-29 ENCOUNTER — TELEPHONE (OUTPATIENT)
Dept: FAMILY MEDICINE CLINIC | Facility: CLINIC | Age: 82
End: 2017-09-29

## 2017-10-01 DIAGNOSIS — I10 ESSENTIAL HYPERTENSION: ICD-10-CM

## 2017-10-03 RX ORDER — FUROSEMIDE 20 MG/1
TABLET ORAL
Qty: 120 TABLET | Refills: 1 | Status: SHIPPED | OUTPATIENT
Start: 2017-10-03 | End: 2018-03-21 | Stop reason: SDUPTHER

## 2017-10-06 ENCOUNTER — TELEPHONE (OUTPATIENT)
Dept: FAMILY MEDICINE CLINIC | Facility: CLINIC | Age: 82
End: 2017-10-06

## 2017-10-06 NOTE — TELEPHONE ENCOUNTER
----- Message from Eula Oliveros sent at 10/6/2017  2:34 PM EDT -----  Contact: JAYLA CHOU GRANDDAUGHTER CALLED TO REQUEST A LETTER DOCUMENTATION LETTER ON THE RESPONSIBILITIES FOR A CARETAKER WOULD PERFORM FOR THIS PATIENT SHE CAN BE REACHED  [phone]

## 2017-10-19 ENCOUNTER — TELEPHONE (OUTPATIENT)
Dept: FAMILY MEDICINE CLINIC | Facility: CLINIC | Age: 82
End: 2017-10-19

## 2017-10-20 ENCOUNTER — TELEPHONE (OUTPATIENT)
Dept: FAMILY MEDICINE CLINIC | Facility: CLINIC | Age: 82
End: 2017-10-20

## 2017-10-24 ENCOUNTER — TELEPHONE (OUTPATIENT)
Dept: FAMILY MEDICINE CLINIC | Facility: CLINIC | Age: 82
End: 2017-10-24

## 2017-10-24 DIAGNOSIS — Z74.09 IMPAIRED MOBILITY AND ADLS: ICD-10-CM

## 2017-10-24 DIAGNOSIS — Z78.9 IMPAIRED MOBILITY AND ADLS: ICD-10-CM

## 2017-10-24 DIAGNOSIS — M51.26 HERNIATED INTERVERTEBRAL DISC OF LUMBAR SPINE: Chronic | ICD-10-CM

## 2017-10-24 DIAGNOSIS — I50.9 CONGESTIVE HEART FAILURE, UNSPECIFIED CONGESTIVE HEART FAILURE CHRONICITY, UNSPECIFIED CONGESTIVE HEART FAILURE TYPE: Primary | ICD-10-CM

## 2017-10-26 ENCOUNTER — TELEPHONE (OUTPATIENT)
Dept: FAMILY MEDICINE CLINIC | Facility: CLINIC | Age: 82
End: 2017-10-26

## 2017-10-26 NOTE — TELEPHONE ENCOUNTER
----- Message from Alicia Huffman sent at 10/24/2017  3:34 PM EDT -----  Contact: SHANNA LITTLE FROM [facility]    SHE HAS THE COMPRESSION BOOT ORDERED, ALSO SHE IS ASKING ABOUT GETTING SOMETHING FROM THAT COMPANY FOR HIS ABDOMEN.     [phone]

## 2017-11-01 ENCOUNTER — TELEPHONE (OUTPATIENT)
Dept: FAMILY MEDICINE CLINIC | Facility: CLINIC | Age: 82
End: 2017-11-01

## 2017-11-01 NOTE — TELEPHONE ENCOUNTER
----- Message from Loretta Oneal sent at 11/1/2017 10:39 AM EDT -----  Contact: DR IRVIN [facility]   KAR FROM [facility] WANTS TO GET A VERBAL ORDER TO GO BACK ONE MORE TIME TO FINALIZED RESOURCE PLANNING. PATIENT IS BEING DISCHARGED FROM [facility] ON 11/8/17 SO SHE NEEDS TO GO BEFORE THAT.  [phone]

## 2017-11-01 NOTE — TELEPHONE ENCOUNTER
Spoke with Romina WALDEN who needs to go back in to see Calderon Scott and his care givers to wrap things up prior to his discharge from [facility] service. He has met his goals according to Romina.   I have given the verbal order. She will go in next Monday.     I have asked Romina to reiterate with the family that he should not be left alone and that he needs to keep his appointments with specialists or reschedule if unable to keep the appt that were made. And they should seriously consider getting Mr Scott on the list for a nursing home facility through the [facility] as I feel like he needs much more care than she and her family are able to give. She will speak with Nya. jas

## 2017-11-03 NOTE — TELEPHONE ENCOUNTER
Please call Nya to ask what reason Mr Scott needs a new  agency for? He has met all his goals with the current agency and is being discharged from their services. ajc

## 2017-11-08 PROBLEM — N39.0 URINARY TRACT INFECTION ASSOCIATED WITH INDWELLING URETHRAL CATHETER (HCC): Status: RESOLVED | Noted: 2017-08-20 | Resolved: 2017-11-08

## 2017-11-08 PROBLEM — M51.26 HERNIATED INTERVERTEBRAL DISC OF LUMBAR SPINE: Chronic | Status: ACTIVE | Noted: 2017-11-08

## 2017-11-08 PROBLEM — T83.511A URINARY TRACT INFECTION ASSOCIATED WITH INDWELLING URETHRAL CATHETER (HCC): Status: RESOLVED | Noted: 2017-08-20 | Resolved: 2017-11-08

## 2017-11-08 PROBLEM — R31.9 HEMATURIA: Status: RESOLVED | Noted: 2017-08-20 | Resolved: 2017-11-08

## 2017-11-08 PROBLEM — T83.038A: Status: RESOLVED | Noted: 2017-08-20 | Resolved: 2017-11-08

## 2017-11-08 PROBLEM — Z97.8 CHRONIC INDWELLING FOLEY CATHETER: Status: RESOLVED | Noted: 2017-08-20 | Resolved: 2017-11-08

## 2017-11-08 NOTE — TELEPHONE ENCOUNTER
Nya was not pleased with [facility], she states they were not reliable. She would like a referral placed to [facility].

## 2017-11-08 NOTE — TELEPHONE ENCOUNTER
Okay to contact Gigi ONTIVEROS to do an evaluation for Mr Scott. [phone]. [facility]

## 2017-11-10 ENCOUNTER — TELEPHONE (OUTPATIENT)
Dept: FAMILY MEDICINE CLINIC | Facility: CLINIC | Age: 82
End: 2017-11-10

## 2017-11-13 ENCOUNTER — TELEPHONE (OUTPATIENT)
Dept: FAMILY MEDICINE CLINIC | Facility: CLINIC | Age: 82
End: 2017-11-13

## 2017-11-13 NOTE — TELEPHONE ENCOUNTER
----- Message from Loretta Oneal sent at 11/13/2017 12:41 PM EST -----  Contact: DR IRVIN ORDER NEEDED  PATIENT NEEDS AN ORDER FOR [facility] SENT TO PATIENT [facility]. THEY ARE COMING TO HIS HOUSE TOMORROW SO THEY NEED IT SENT TODAY AND IT CAN'T WAIT FOR SHANNA TOMORROW.  [phone]

## 2017-11-13 NOTE — TELEPHONE ENCOUNTER
----- Message from Loretta Oneal sent at 11/13/2017  8:29 AM EST -----  Contact: [facility] RECEIVED AND ORDER FOR HIM BUT THEY ARE  NOT ACCEPTING HIS INSURANCE (ANTH MEDICARE REPLACEMENT) AT THIS TIME.

## 2017-11-17 ENCOUNTER — TELEPHONE (OUTPATIENT)
Dept: FAMILY MEDICINE CLINIC | Facility: CLINIC | Age: 82
End: 2017-11-17

## 2017-11-17 NOTE — TELEPHONE ENCOUNTER
----- Message from Jocelyn Orourke sent at 11/17/2017  1:31 PM EST -----  Contact: SHANNA DICKERSON / JOSÉ ANTONIO GRAND DAUGHTER  JOSÉ ANTONIO GRAND DAUGHTER OF MR SCHULTZ CALLED AND STATED THEY WERE USING [employer] BUT ARE SWITCHING OVER TO [employer] AND MR SCHULTZ HAS NOT HEARD FROM [facility]. MR SCHULTZ HAS NOT BEEN SEEN BY HOME HEALTH FOR OVER A WEEK NOW.  DO THEY NEED TO CALL [facility] TO SET UP THE FIRST VISIT?    JOSÉ ANTONIO [phone]

## 2017-11-17 NOTE — TELEPHONE ENCOUNTER
Gigi informed us that they do not take his insurance. He will need to continue with [facility]

## 2017-12-12 RX ORDER — METOPROLOL TARTRATE 50 MG/1
TABLET, FILM COATED ORAL
Qty: 180 TABLET | Refills: 1 | Status: SHIPPED | OUTPATIENT
Start: 2017-12-12

## 2017-12-12 RX ORDER — FAMOTIDINE 20 MG/1
TABLET, FILM COATED ORAL
Qty: 90 TABLET | Refills: 1 | Status: SHIPPED | OUTPATIENT
Start: 2017-12-12

## 2017-12-12 RX ORDER — LISINOPRIL 10 MG/1
TABLET ORAL
Qty: 90 TABLET | Refills: 1 | Status: SHIPPED | OUTPATIENT
Start: 2017-12-12

## 2017-12-12 RX ORDER — TAMSULOSIN HYDROCHLORIDE 0.4 MG/1
CAPSULE ORAL
Qty: 90 CAPSULE | Refills: 1 | Status: SHIPPED | OUTPATIENT
Start: 2017-12-12

## 2017-12-12 RX ORDER — FINASTERIDE 5 MG/1
TABLET, FILM COATED ORAL
Qty: 90 TABLET | Refills: 1 | Status: SHIPPED | OUTPATIENT
Start: 2017-12-12

## 2017-12-13 ENCOUNTER — TELEPHONE (OUTPATIENT)
Dept: FAMILY MEDICINE CLINIC | Facility: CLINIC | Age: 82
End: 2017-12-13

## 2017-12-13 DIAGNOSIS — L89.509: Primary | ICD-10-CM

## 2017-12-13 NOTE — TELEPHONE ENCOUNTER
----- Message from Alicia Huffman sent at 12/13/2017 11:36 AM EST -----  Contact: SHANNA  GRANDDAUGHTER/CAREGIVER NEEDS YOU TO GIVE HER A CALL BACK.  HIS HOME HEALTH : SHE WOULD LIKE SOME ORDERS PUT IN FOR WOUND CARE,   LEGS WRAPED..ETC.    SHE WAS HAVING A PROBLEM TALKING BECAUSE ONE OF HER CHILDREN WAS CRYING.    PLEASE CALL HER AT:  [phone]

## 2017-12-13 NOTE — TELEPHONE ENCOUNTER
Pt's granddaughter say's, he needs the wraps for the edema and since they have not been wrapped for the past two months he has developed pressure sores on his ankles.  They want to use [facility] again for now.

## 2017-12-13 NOTE — TELEPHONE ENCOUNTER
Please call grand daughter Nya to see what Mr Scott is needing wound care and leg wraps for. Thank you. colette

## 2017-12-14 RX ORDER — SIMVASTATIN 5 MG
TABLET ORAL
Qty: 90 TABLET | Refills: 1 | Status: SHIPPED | OUTPATIENT
Start: 2017-12-14

## 2017-12-14 NOTE — TELEPHONE ENCOUNTER
Informed Nya of insurance denial of HH visits in November. Recommend that she contact the [facility] to see if he can get services through them. She says she will try that. ajc

## 2017-12-14 NOTE — TELEPHONE ENCOUNTER
----- Message from Alicia Becker sent at 12/14/2017 12:36 PM EST -----  Contact: BRANDEE;[facility]  DRAKE FROM [facility] PT DISMISSED  11/14/2017 DUE TO INSURANCE DENIED VISITS    DRAKE [phone]

## 2018-01-08 ENCOUNTER — TELEPHONE (OUTPATIENT)
Dept: FAMILY MEDICINE CLINIC | Facility: CLINIC | Age: 83
End: 2018-01-08

## 2018-01-08 DIAGNOSIS — G82.20 LOWER PARAPLEGIA (HCC): Primary | ICD-10-CM

## 2018-01-08 DIAGNOSIS — M51.26 HERNIATED INTERVERTEBRAL DISC OF LUMBAR SPINE: Chronic | ICD-10-CM

## 2018-01-08 DIAGNOSIS — I50.9 CONGESTIVE HEART FAILURE, UNSPECIFIED CONGESTIVE HEART FAILURE CHRONICITY, UNSPECIFIED CONGESTIVE HEART FAILURE TYPE: ICD-10-CM

## 2018-01-08 DIAGNOSIS — N39.42 URINARY INCONTINENCE WITHOUT SENSORY AWARENESS: ICD-10-CM

## 2018-01-08 NOTE — TELEPHONE ENCOUNTER
----- Message from Jocelyn Orourke sent at 1/8/2018  1:06 PM EST -----  Contact: JAYLA / JOSÉ ANTONIO CHOU CAREGIVER OF PT  IS REQUESTING NEW ORDER FOR HOME HEALTH, SHE IS REQUESTING NEW ASSESMENT FOR EVERYTHING.  HE WAS GETTING NURSE AND AIDE VISITS AND WOUND CARE.  PATIENTS INSURANCE STARTED OVER FOR THE NEW YEAR.      JOSÉ ANTONIO  [phone]

## 2018-01-09 PROBLEM — M79.89 LOCALIZED SWELLING OF BOTH LOWER EXTREMITIES: Status: ACTIVE | Noted: 2018-01-09

## 2018-01-10 ENCOUNTER — TELEPHONE (OUTPATIENT)
Dept: FAMILY MEDICINE CLINIC | Facility: CLINIC | Age: 83
End: 2018-01-10

## 2018-01-10 NOTE — TELEPHONE ENCOUNTER
----- Message from Jocelyn Orourke sent at 1/10/2018 12:01 PM EST -----  Contact: JAYLA / [facility]  DRAKE WITH [facility] CALLED AND STATED THAT THEY CANNOT PROVIDE SERVICES FOR PATIENT - DENIED PTS INSURANCE      DRAKE  [phone]

## 2018-01-30 ENCOUNTER — OFFICE VISIT (OUTPATIENT)
Dept: FAMILY MEDICINE CLINIC | Facility: CLINIC | Age: 83
End: 2018-01-30

## 2018-01-30 VITALS
HEIGHT: 74 IN | DIASTOLIC BLOOD PRESSURE: 84 MMHG | RESPIRATION RATE: 18 BRPM | HEART RATE: 72 BPM | TEMPERATURE: 97.6 F | SYSTOLIC BLOOD PRESSURE: 128 MMHG

## 2018-01-30 DIAGNOSIS — Z78.9 IMPAIRED MOBILITY AND ADLS: ICD-10-CM

## 2018-01-30 DIAGNOSIS — R39.81 URINARY INCONTINENCE DUE TO SEVERE PHYSICAL DISABILITY: ICD-10-CM

## 2018-01-30 DIAGNOSIS — M79.89 LOCALIZED SWELLING OF BOTH LOWER EXTREMITIES: ICD-10-CM

## 2018-01-30 DIAGNOSIS — I48.20 CHRONIC A-FIB (HCC): Primary | ICD-10-CM

## 2018-01-30 DIAGNOSIS — Z74.09 IMPAIRED MOBILITY AND ADLS: ICD-10-CM

## 2018-01-30 DIAGNOSIS — I82.4Y1 DEEP VEIN THROMBOSIS (DVT) OF PROXIMAL VEIN OF RIGHT LOWER EXTREMITY, UNSPECIFIED CHRONICITY (HCC): ICD-10-CM

## 2018-01-30 DIAGNOSIS — I50.9 CONGESTIVE HEART FAILURE, UNSPECIFIED CONGESTIVE HEART FAILURE CHRONICITY, UNSPECIFIED CONGESTIVE HEART FAILURE TYPE: ICD-10-CM

## 2018-01-30 DIAGNOSIS — M51.26 HERNIATED INTERVERTEBRAL DISC OF LUMBAR SPINE: ICD-10-CM

## 2018-01-30 PROCEDURE — 99214 OFFICE O/P EST MOD 30 MIN: CPT | Performed by: NURSE PRACTITIONER

## 2018-01-30 RX ORDER — RIVAROXABAN 20 MG/1
20 TABLET, FILM COATED ORAL DAILY
Qty: 90 TABLET | Refills: 1 | Status: SHIPPED | OUTPATIENT
Start: 2018-01-30

## 2018-01-31 PROBLEM — Z78.9 IMPAIRED MOBILITY AND ADLS: Status: ACTIVE | Noted: 2018-01-31

## 2018-01-31 PROBLEM — T83.511A URINARY TRACT INFECTION ASSOCIATED WITH CATHETERIZATION OF URINARY TRACT (HCC): Status: RESOLVED | Noted: 2017-07-29 | Resolved: 2018-01-31

## 2018-01-31 PROBLEM — R39.81 URINARY INCONTINENCE DUE TO SEVERE PHYSICAL DISABILITY: Status: ACTIVE | Noted: 2018-01-31

## 2018-01-31 PROBLEM — Z74.09 IMPAIRED MOBILITY AND ADLS: Status: ACTIVE | Noted: 2018-01-31

## 2018-01-31 PROBLEM — N39.0 URINARY TRACT INFECTION ASSOCIATED WITH CATHETERIZATION OF URINARY TRACT (HCC): Status: RESOLVED | Noted: 2017-07-29 | Resolved: 2018-01-31

## 2018-02-09 RX ORDER — POTASSIUM CHLORIDE 750 MG/1
TABLET, FILM COATED, EXTENDED RELEASE ORAL
Qty: 180 TABLET | Refills: 1 | Status: SHIPPED | OUTPATIENT
Start: 2018-02-09

## 2018-02-20 ENCOUNTER — TELEPHONE (OUTPATIENT)
Dept: FAMILY MEDICINE CLINIC | Facility: CLINIC | Age: 83
End: 2018-02-20

## 2018-02-20 DIAGNOSIS — Z59.9 INADEQUATE COMMUNITY RESOURCES: Primary | ICD-10-CM

## 2018-02-20 SDOH — ECONOMIC STABILITY - INCOME SECURITY: PROBLEM RELATED TO HOUSING AND ECONOMIC CIRCUMSTANCES, UNSPECIFIED: Z59.9

## 2018-02-20 NOTE — TELEPHONE ENCOUNTER
----- Message from Alicia Huffman sent at 2/20/2018 12:34 PM EST -----  Contact: [facility].  ASKING FOR ORDER SOCIAL WORK CONSULT TO HELP WITH COMMUNITY RESOURCES.     [phone]

## 2018-02-20 NOTE — TELEPHONE ENCOUNTER
SPOKE WITH JUAN FRANCISCO AND INFORMED HER OF THIS AND SHE STATES THAT SHE CAN JUST TAKE VERBAL ON THIS.

## 2018-02-27 ENCOUNTER — TELEPHONE (OUTPATIENT)
Dept: FAMILY MEDICINE CLINIC | Facility: CLINIC | Age: 83
End: 2018-02-27

## 2018-03-19 ENCOUNTER — TELEPHONE (OUTPATIENT)
Dept: FAMILY MEDICINE CLINIC | Facility: CLINIC | Age: 83
End: 2018-03-19

## 2018-03-19 DIAGNOSIS — R60.0 LOCALIZED EDEMA: Primary | ICD-10-CM

## 2018-03-19 NOTE — TELEPHONE ENCOUNTER
Please call.  For the next 2-3 days, could increase the furosemide by 1 additional pill.  Looks like he is taking 20 mg one twice a day.  Can take 2 in the morning and then one in the afternoon.    Is he getting home health?  If yes, would also like for him to get blood work including CBC and CMP.  Diagnosis edema.    We will forward to Sandra King.

## 2018-03-19 NOTE — TELEPHONE ENCOUNTER
----- Message from Jocelyn Orourke sent at 3/19/2018 11:04 AM EDT -----  Contact: BRANDEE / JOSÉ ANTONIO GRAND DAUGHTER  JOSÉ ANTONIO IS THE CAREGIVER OF [place].  PATIENT IS STILL HAVING A LOT OF SWELLING WITH BEING ON HIS WATER PILL.  SHE THOUGHT IT MAY NEED TO BE ADJUSTED OR IF HE NEEDS TO BE SEEN    JOSÉ ANTONIO - [phone]    HE HAS BEEN SEEING SHANNA BUT SHE IS OUT THROUGH [facility]

## 2018-03-19 NOTE — TELEPHONE ENCOUNTER
Informed pt's granddaughter about increasing the med and no, he does not have home health anymore, he was discharged.

## 2018-03-21 DIAGNOSIS — I10 ESSENTIAL HYPERTENSION: ICD-10-CM

## 2018-03-21 RX ORDER — FUROSEMIDE 20 MG/1
TABLET ORAL
Qty: 120 TABLET | Refills: 0 | Status: SHIPPED | OUTPATIENT
Start: 2018-03-21 | End: 2018-05-18 | Stop reason: SDUPTHER

## 2018-03-28 ENCOUNTER — RESULTS ENCOUNTER (OUTPATIENT)
Dept: FAMILY MEDICINE CLINIC | Facility: CLINIC | Age: 83
End: 2018-03-28

## 2018-03-28 DIAGNOSIS — R60.0 LOCALIZED EDEMA: ICD-10-CM

## 2018-05-14 ENCOUNTER — TELEPHONE (OUTPATIENT)
Dept: FAMILY MEDICINE CLINIC | Facility: CLINIC | Age: 83
End: 2018-05-14

## 2018-05-14 NOTE — TELEPHONE ENCOUNTER
----- Message from Kristin Sanabria sent at 5/14/2018 11:40 AM EDT -----  Contact: SHANNA;   PT'S CAREGIVER CALLED STATING SHE THINKS THAT HE NEEDS A MENTAL HEALTH EVALUATION; OR A COUNSELOR SOMEONE FOR THE PATIENT TO SPEAK WITH. SHE WANTED TO KNOW IF YOU WOULD BE ABLE TO RECOMMEND SOMEONE OR REFER HIM OUT.       CALL BACK   [phone]

## 2018-05-15 NOTE — TELEPHONE ENCOUNTER
Please ask what he is needing the appt for, is he depressed?    We use [facility] in [place]    [phone]     Others in [place]:     [facility]  [phone]    [facility]   [phone]    Pt or family call to make the appointment we do not make referrals for mental health. [facility]

## 2018-05-21 ENCOUNTER — HOSPITAL ENCOUNTER (INPATIENT)
Facility: HOSPITAL | Age: 83
LOS: 6 days | Discharge: NURSING, REHAB OR LONG-TERM CARE | End: 2018-05-28
Attending: EMERGENCY MEDICINE | Admitting: FAMILY MEDICINE

## 2018-05-21 ENCOUNTER — APPOINTMENT (OUTPATIENT)
Dept: GENERAL RADIOLOGY | Facility: HOSPITAL | Age: 83
End: 2018-05-21

## 2018-05-21 DIAGNOSIS — Z74.09 IMPAIRED FUNCTIONAL MOBILITY, BALANCE, GAIT, AND ENDURANCE: ICD-10-CM

## 2018-05-21 DIAGNOSIS — R09.02 HYPOXIA: ICD-10-CM

## 2018-05-21 DIAGNOSIS — Z74.09 IMPAIRED MOBILITY AND ADLS: ICD-10-CM

## 2018-05-21 DIAGNOSIS — I50.33 ACUTE ON CHRONIC DIASTOLIC CONGESTIVE HEART FAILURE (HCC): ICD-10-CM

## 2018-05-21 DIAGNOSIS — J18.9 PNEUMONIA OF RIGHT LOWER LOBE DUE TO INFECTIOUS ORGANISM: Primary | ICD-10-CM

## 2018-05-21 DIAGNOSIS — Z78.9 IMPAIRED MOBILITY AND ADLS: ICD-10-CM

## 2018-05-21 LAB
ALBUMIN SERPL-MCNC: 3.3 G/DL (ref 3.2–4.8)
ALBUMIN/GLOB SERPL: 1.1 G/DL (ref 1.5–2.5)
ALP SERPL-CCNC: 95 U/L (ref 25–100)
ALT SERPL W P-5'-P-CCNC: 19 U/L (ref 7–40)
ANION GAP SERPL CALCULATED.3IONS-SCNC: 6 MMOL/L (ref 3–11)
AST SERPL-CCNC: 28 U/L (ref 0–33)
BASOPHILS # BLD AUTO: 0.02 10*3/MM3 (ref 0–0.2)
BASOPHILS NFR BLD AUTO: 0.3 % (ref 0–1)
BILIRUB SERPL-MCNC: 0.8 MG/DL (ref 0.3–1.2)
BNP SERPL-MCNC: 250 PG/ML (ref 0–100)
BUN BLD-MCNC: 23 MG/DL (ref 9–23)
BUN/CREAT SERPL: 38.3 (ref 7–25)
CALCIUM SPEC-SCNC: 8.5 MG/DL (ref 8.7–10.4)
CHLORIDE SERPL-SCNC: 103 MMOL/L (ref 99–109)
CO2 SERPL-SCNC: 29 MMOL/L (ref 20–31)
CREAT BLD-MCNC: 0.6 MG/DL (ref 0.6–1.3)
DEPRECATED RDW RBC AUTO: 52.8 FL (ref 37–54)
EOSINOPHIL # BLD AUTO: 0.11 10*3/MM3 (ref 0–0.3)
EOSINOPHIL NFR BLD AUTO: 1.6 % (ref 0–3)
ERYTHROCYTE [DISTWIDTH] IN BLOOD BY AUTOMATED COUNT: 15.8 % (ref 11.3–14.5)
GFR SERPL CREATININE-BSD FRML MDRD: 128 ML/MIN/1.73
GLOBULIN UR ELPH-MCNC: 3.1 GM/DL
GLUCOSE BLD-MCNC: 179 MG/DL (ref 70–100)
HCT VFR BLD AUTO: 45.4 % (ref 38.9–50.9)
HGB BLD-MCNC: 14.9 G/DL (ref 13.1–17.5)
HOLD SPECIMEN: NORMAL
HOLD SPECIMEN: NORMAL
IMM GRANULOCYTES # BLD: 0.01 10*3/MM3 (ref 0–0.03)
IMM GRANULOCYTES NFR BLD: 0.1 % (ref 0–0.6)
LYMPHOCYTES # BLD AUTO: 0.83 10*3/MM3 (ref 0.6–4.8)
LYMPHOCYTES NFR BLD AUTO: 12.2 % (ref 24–44)
MCH RBC QN AUTO: 30 PG (ref 27–31)
MCHC RBC AUTO-ENTMCNC: 32.8 G/DL (ref 32–36)
MCV RBC AUTO: 91.5 FL (ref 80–99)
MONOCYTES # BLD AUTO: 0.8 10*3/MM3 (ref 0–1)
MONOCYTES NFR BLD AUTO: 11.8 % (ref 0–12)
NEUTROPHILS # BLD AUTO: 5.02 10*3/MM3 (ref 1.5–8.3)
NEUTROPHILS NFR BLD AUTO: 74.1 % (ref 41–71)
PLATELET # BLD AUTO: 154 10*3/MM3 (ref 150–450)
PMV BLD AUTO: 9.6 FL (ref 6–12)
POTASSIUM BLD-SCNC: 4.5 MMOL/L (ref 3.5–5.5)
PROT SERPL-MCNC: 6.4 G/DL (ref 5.7–8.2)
RBC # BLD AUTO: 4.96 10*6/MM3 (ref 4.2–5.76)
SODIUM BLD-SCNC: 138 MMOL/L (ref 132–146)
TROPONIN I SERPL-MCNC: 0.03 NG/ML (ref 0–0.07)
WBC NRBC COR # BLD: 6.78 10*3/MM3 (ref 3.5–10.8)
WHOLE BLOOD HOLD SPECIMEN: NORMAL
WHOLE BLOOD HOLD SPECIMEN: NORMAL

## 2018-05-21 PROCEDURE — 85025 COMPLETE CBC W/AUTO DIFF WBC: CPT | Performed by: EMERGENCY MEDICINE

## 2018-05-21 PROCEDURE — 99285 EMERGENCY DEPT VISIT HI MDM: CPT

## 2018-05-21 PROCEDURE — 93005 ELECTROCARDIOGRAM TRACING: CPT | Performed by: EMERGENCY MEDICINE

## 2018-05-21 PROCEDURE — 83880 ASSAY OF NATRIURETIC PEPTIDE: CPT | Performed by: EMERGENCY MEDICINE

## 2018-05-21 PROCEDURE — 93005 ELECTROCARDIOGRAM TRACING: CPT

## 2018-05-21 PROCEDURE — 25010000002 FUROSEMIDE PER 20 MG: Performed by: EMERGENCY MEDICINE

## 2018-05-21 PROCEDURE — 84484 ASSAY OF TROPONIN QUANT: CPT

## 2018-05-21 PROCEDURE — 80053 COMPREHEN METABOLIC PANEL: CPT | Performed by: EMERGENCY MEDICINE

## 2018-05-21 PROCEDURE — 71045 X-RAY EXAM CHEST 1 VIEW: CPT

## 2018-05-21 RX ORDER — SODIUM CHLORIDE 0.9 % (FLUSH) 0.9 %
10 SYRINGE (ML) INJECTION AS NEEDED
Status: DISCONTINUED | OUTPATIENT
Start: 2018-05-21 | End: 2018-05-28 | Stop reason: HOSPADM

## 2018-05-21 RX ORDER — FUROSEMIDE 10 MG/ML
40 INJECTION INTRAMUSCULAR; INTRAVENOUS ONCE
Status: COMPLETED | OUTPATIENT
Start: 2018-05-21 | End: 2018-05-21

## 2018-05-21 RX ADMIN — FUROSEMIDE 40 MG: 10 INJECTION, SOLUTION INTRAMUSCULAR; INTRAVENOUS at 22:24

## 2018-05-22 ENCOUNTER — APPOINTMENT (OUTPATIENT)
Dept: CT IMAGING | Facility: HOSPITAL | Age: 83
End: 2018-05-22

## 2018-05-22 ENCOUNTER — APPOINTMENT (OUTPATIENT)
Dept: CARDIOLOGY | Facility: HOSPITAL | Age: 83
End: 2018-05-22

## 2018-05-22 PROBLEM — I50.33 ACUTE ON CHRONIC DIASTOLIC CONGESTIVE HEART FAILURE (HCC): Status: ACTIVE | Noted: 2018-05-22

## 2018-05-22 PROBLEM — I48.20 CHRONIC A-FIB (HCC): Chronic | Status: ACTIVE | Noted: 2017-05-18

## 2018-05-22 PROBLEM — Z86.718 HISTORY OF DVT (DEEP VEIN THROMBOSIS): Status: ACTIVE | Noted: 2018-05-22

## 2018-05-22 PROBLEM — R73.9 HYPERGLYCEMIA: Status: ACTIVE | Noted: 2018-05-22

## 2018-05-22 PROBLEM — S91.209A AVULSED TOENAIL: Status: ACTIVE | Noted: 2018-05-22

## 2018-05-22 PROBLEM — J18.9 PNEUMONIA: Status: ACTIVE | Noted: 2018-05-22

## 2018-05-22 PROBLEM — J18.9 PNEUMONIA OF RIGHT LOWER LOBE DUE TO INFECTIOUS ORGANISM: Status: ACTIVE | Noted: 2018-05-22

## 2018-05-22 PROBLEM — R60.9 EDEMA: Chronic | Status: ACTIVE | Noted: 2018-05-22

## 2018-05-22 PROBLEM — J96.01 ACUTE RESPIRATORY FAILURE WITH HYPOXIA (HCC): Status: ACTIVE | Noted: 2018-05-22

## 2018-05-22 LAB
ANION GAP SERPL CALCULATED.3IONS-SCNC: 4 MMOL/L (ref 3–11)
BACTERIA UR QL AUTO: ABNORMAL /HPF
BH CV ECHO MEAS - AO ROOT AREA (BSA CORRECTED): 1.2
BH CV ECHO MEAS - AO ROOT AREA: 7 CM^2
BH CV ECHO MEAS - AO ROOT DIAM: 3 CM
BH CV ECHO MEAS - BSA(HAYCOCK): 2.5 M^2
BH CV ECHO MEAS - BSA: 2.4 M^2
BH CV ECHO MEAS - BZI_BMI: 32.6 KILOGRAMS/M^2
BH CV ECHO MEAS - BZI_METRIC_HEIGHT: 188 CM
BH CV ECHO MEAS - BZI_METRIC_WEIGHT: 115.2 KG
BH CV ECHO MEAS - EDV(CUBED): 163.9 ML
BH CV ECHO MEAS - EDV(TEICH): 145.7 ML
BH CV ECHO MEAS - EF(CUBED): 64.8 %
BH CV ECHO MEAS - EF(MOD-BP): 58 %
BH CV ECHO MEAS - EF(TEICH): 55.7 %
BH CV ECHO MEAS - ESV(CUBED): 57.8 ML
BH CV ECHO MEAS - ESV(TEICH): 64.5 ML
BH CV ECHO MEAS - FS: 29.4 %
BH CV ECHO MEAS - IVS/LVPW: 0.98
BH CV ECHO MEAS - IVSD: 1.2 CM
BH CV ECHO MEAS - LA DIMENSION: 3.9 CM
BH CV ECHO MEAS - LA/AO: 1.3
BH CV ECHO MEAS - LV MASS(C)D: 266.6 GRAMS
BH CV ECHO MEAS - LV MASS(C)DI: 110.8 GRAMS/M^2
BH CV ECHO MEAS - LVIDD: 5.5 CM
BH CV ECHO MEAS - LVIDS: 3.9 CM
BH CV ECHO MEAS - LVPWD: 1.2 CM
BH CV ECHO MEAS - MV DEC SLOPE: 601.7 CM/SEC^2
BH CV ECHO MEAS - MV DEC TIME: 0.23 SEC
BH CV ECHO MEAS - MV E MAX VEL: 139.7 CM/SEC
BH CV ECHO MEAS - MV P1/2T MAX VEL: 124.9 CM/SEC
BH CV ECHO MEAS - MV P1/2T: 60.8 MSEC
BH CV ECHO MEAS - MVA P1/2T LCG: 1.8 CM^2
BH CV ECHO MEAS - MVA(P1/2T): 3.6 CM^2
BH CV ECHO MEAS - PA ACC SLOPE: 639.6 CM/SEC^2
BH CV ECHO MEAS - PA ACC TIME: 0.11 SEC
BH CV ECHO MEAS - PA PR(ACCEL): 28.3 MMHG
BH CV ECHO MEAS - PI END-D VEL: 171 CM/SEC
BH CV ECHO MEAS - RAP SYSTOLE: 3 MMHG
BH CV ECHO MEAS - RV MAX PG: 2 MMHG
BH CV ECHO MEAS - RV V1 MAX: 70.6 CM/SEC
BH CV ECHO MEAS - RVSP: 30 MMHG
BH CV ECHO MEAS - SI(CUBED): 44.1 ML/M^2
BH CV ECHO MEAS - SI(TEICH): 33.8 ML/M^2
BH CV ECHO MEAS - SV(CUBED): 106.1 ML
BH CV ECHO MEAS - SV(TEICH): 81.2 ML
BH CV ECHO MEAS - TAPSE (>1.6): 1.9 CM2
BH CV ECHO MEAS - TR MAX VEL: 259.3 CM/SEC
BH CV XLRA - RV BASE: 4 CM
BH CV XLRA - RV LENGTH: 7 CM
BH CV XLRA - RV MID: 3 CM
BH CV XLRA - TDI S': 8.66 CM/SEC
BILIRUB UR QL STRIP: NEGATIVE
BUN BLD-MCNC: 23 MG/DL (ref 9–23)
BUN/CREAT SERPL: 38.3 (ref 7–25)
CALCIUM SPEC-SCNC: 8.1 MG/DL (ref 8.7–10.4)
CHLORIDE SERPL-SCNC: 102 MMOL/L (ref 99–109)
CLARITY UR: CLEAR
CO2 SERPL-SCNC: 34 MMOL/L (ref 20–31)
COLOR UR: YELLOW
CREAT BLD-MCNC: 0.6 MG/DL (ref 0.6–1.3)
D-LACTATE SERPL-SCNC: 0.8 MMOL/L (ref 0.5–2)
DEPRECATED RDW RBC AUTO: 53.8 FL (ref 37–54)
ERYTHROCYTE [DISTWIDTH] IN BLOOD BY AUTOMATED COUNT: 15.9 % (ref 11.3–14.5)
GFR SERPL CREATININE-BSD FRML MDRD: 128 ML/MIN/1.73
GLUCOSE BLD-MCNC: 101 MG/DL (ref 70–100)
GLUCOSE BLDC GLUCOMTR-MCNC: 103 MG/DL (ref 70–130)
GLUCOSE BLDC GLUCOMTR-MCNC: 104 MG/DL (ref 70–130)
GLUCOSE BLDC GLUCOMTR-MCNC: 116 MG/DL (ref 70–130)
GLUCOSE BLDC GLUCOMTR-MCNC: 125 MG/DL (ref 70–130)
GLUCOSE UR STRIP-MCNC: NEGATIVE MG/DL
HBA1C MFR BLD: 5.4 % (ref 4.8–5.6)
HCT VFR BLD AUTO: 43.8 % (ref 38.9–50.9)
HGB BLD-MCNC: 14.4 G/DL (ref 13.1–17.5)
HGB UR QL STRIP.AUTO: ABNORMAL
HYALINE CASTS UR QL AUTO: ABNORMAL /LPF
KETONES UR QL STRIP: NEGATIVE
LEUKOCYTE ESTERASE UR QL STRIP.AUTO: ABNORMAL
LV EF 2D ECHO EST: 60 %
MCH RBC QN AUTO: 30.2 PG (ref 27–31)
MCHC RBC AUTO-ENTMCNC: 32.9 G/DL (ref 32–36)
MCV RBC AUTO: 91.8 FL (ref 80–99)
NITRITE UR QL STRIP: NEGATIVE
PH UR STRIP.AUTO: <=5 [PH] (ref 5–8)
PLATELET # BLD AUTO: 141 10*3/MM3 (ref 150–450)
PMV BLD AUTO: 9.9 FL (ref 6–12)
POTASSIUM BLD-SCNC: 3.6 MMOL/L (ref 3.5–5.5)
PROCALCITONIN SERPL-MCNC: <0.05 NG/ML
PROT UR QL STRIP: ABNORMAL
RBC # BLD AUTO: 4.77 10*6/MM3 (ref 4.2–5.76)
RBC # UR: ABNORMAL /HPF
REF LAB TEST METHOD: ABNORMAL
SODIUM BLD-SCNC: 140 MMOL/L (ref 132–146)
SP GR UR STRIP: 1.01 (ref 1–1.03)
SQUAMOUS #/AREA URNS HPF: ABNORMAL /HPF
UROBILINOGEN UR QL STRIP: ABNORMAL
WBC NRBC COR # BLD: 6.31 10*3/MM3 (ref 3.5–10.8)
WBC UR QL AUTO: ABNORMAL /HPF

## 2018-05-22 PROCEDURE — 25010000002 CEFTRIAXONE PER 250 MG: Performed by: EMERGENCY MEDICINE

## 2018-05-22 PROCEDURE — 63710000001 LISINOPRIL 10 MG TABLET: Performed by: NURSE PRACTITIONER

## 2018-05-22 PROCEDURE — 85027 COMPLETE CBC AUTOMATED: CPT | Performed by: NURSE PRACTITIONER

## 2018-05-22 PROCEDURE — 94799 UNLISTED PULMONARY SVC/PX: CPT

## 2018-05-22 PROCEDURE — A9270 NON-COVERED ITEM OR SERVICE: HCPCS | Performed by: NURSE PRACTITIONER

## 2018-05-22 PROCEDURE — 80048 BASIC METABOLIC PNL TOTAL CA: CPT | Performed by: NURSE PRACTITIONER

## 2018-05-22 PROCEDURE — 94640 AIRWAY INHALATION TREATMENT: CPT

## 2018-05-22 PROCEDURE — 87040 BLOOD CULTURE FOR BACTERIA: CPT | Performed by: EMERGENCY MEDICINE

## 2018-05-22 PROCEDURE — 25010000002 CEFTRIAXONE PER 250 MG: Performed by: NURSE PRACTITIONER

## 2018-05-22 PROCEDURE — 93306 TTE W/DOPPLER COMPLETE: CPT

## 2018-05-22 PROCEDURE — 63710000001 CALCIUM CARBONATE 500 MG CHEWABLE TABLET: Performed by: NURSE PRACTITIONER

## 2018-05-22 PROCEDURE — 63710000001 RIVAROXABAN 20 MG TABLET: Performed by: NURSE PRACTITIONER

## 2018-05-22 PROCEDURE — 63710000001 METHENAMINE 1 G TABLET: Performed by: NURSE PRACTITIONER

## 2018-05-22 PROCEDURE — 63710000001 TAMSULOSIN 0.4 MG CAPSULE: Performed by: NURSE PRACTITIONER

## 2018-05-22 PROCEDURE — 63710000001 GABAPENTIN 400 MG CAPSULE: Performed by: NURSE PRACTITIONER

## 2018-05-22 PROCEDURE — 94760 N-INVAS EAR/PLS OXIMETRY 1: CPT

## 2018-05-22 PROCEDURE — 71250 CT THORAX DX C-: CPT

## 2018-05-22 PROCEDURE — 87186 SC STD MICRODIL/AGAR DIL: CPT | Performed by: NURSE PRACTITIONER

## 2018-05-22 PROCEDURE — 83605 ASSAY OF LACTIC ACID: CPT | Performed by: EMERGENCY MEDICINE

## 2018-05-22 PROCEDURE — 63710000001 CITALOPRAM 20 MG TABLET: Performed by: NURSE PRACTITIONER

## 2018-05-22 PROCEDURE — 84145 PROCALCITONIN (PCT): CPT | Performed by: EMERGENCY MEDICINE

## 2018-05-22 PROCEDURE — 25010000002 FUROSEMIDE PER 20 MG: Performed by: NURSE PRACTITIONER

## 2018-05-22 PROCEDURE — 87086 URINE CULTURE/COLONY COUNT: CPT | Performed by: NURSE PRACTITIONER

## 2018-05-22 PROCEDURE — 63710000001 METOPROLOL TARTRATE 50 MG TABLET: Performed by: NURSE PRACTITIONER

## 2018-05-22 PROCEDURE — 63710000001 GUAIFENESIN 600 MG TABLET SUSTAINED-RELEASE 12 HOUR: Performed by: NURSE PRACTITIONER

## 2018-05-22 PROCEDURE — 82962 GLUCOSE BLOOD TEST: CPT

## 2018-05-22 PROCEDURE — 99221 1ST HOSP IP/OBS SF/LOW 40: CPT | Performed by: INTERNAL MEDICINE

## 2018-05-22 PROCEDURE — 63710000001 VITAMIN C 500 MG TABLET: Performed by: NURSE PRACTITIONER

## 2018-05-22 PROCEDURE — 63710000001 FAMOTIDINE 20 MG TABLET: Performed by: NURSE PRACTITIONER

## 2018-05-22 PROCEDURE — A9270 NON-COVERED ITEM OR SERVICE: HCPCS | Performed by: FAMILY MEDICINE

## 2018-05-22 PROCEDURE — 63710000001 METOPROLOL TARTRATE 50 MG TABLET: Performed by: FAMILY MEDICINE

## 2018-05-22 PROCEDURE — 81001 URINALYSIS AUTO W/SCOPE: CPT | Performed by: NURSE PRACTITIONER

## 2018-05-22 PROCEDURE — 63710000001 POTASSIUM CHLORIDE 10 MEQ CAPSULE CONTROLLED-RELEASE: Performed by: NURSE PRACTITIONER

## 2018-05-22 PROCEDURE — 63710000001 FINASTERIDE 5 MG TABLET: Performed by: NURSE PRACTITIONER

## 2018-05-22 PROCEDURE — 83036 HEMOGLOBIN GLYCOSYLATED A1C: CPT | Performed by: NURSE PRACTITIONER

## 2018-05-22 PROCEDURE — 87077 CULTURE AEROBIC IDENTIFY: CPT | Performed by: NURSE PRACTITIONER

## 2018-05-22 PROCEDURE — 93306 TTE W/DOPPLER COMPLETE: CPT | Performed by: INTERNAL MEDICINE

## 2018-05-22 PROCEDURE — 25010000002 AZITHROMYCIN: Performed by: EMERGENCY MEDICINE

## 2018-05-22 PROCEDURE — 63710000001 MULTIVITAMIN WITH MINERALS TABLET: Performed by: NURSE PRACTITIONER

## 2018-05-22 PROCEDURE — 63710000001 NYSTATIN 100000 UNIT/GM OINTMENT 15 G TUBE: Performed by: NURSE PRACTITIONER

## 2018-05-22 PROCEDURE — 63710000001 CHOLECALCIFEROL 1000 UNITS TABLET: Performed by: NURSE PRACTITIONER

## 2018-05-22 PROCEDURE — 99223 1ST HOSP IP/OBS HIGH 75: CPT | Performed by: FAMILY MEDICINE

## 2018-05-22 PROCEDURE — 63710000001 LACTOBACILLUS ACIDOPHILUS CAPSULE: Performed by: NURSE PRACTITIONER

## 2018-05-22 RX ORDER — TAMSULOSIN HYDROCHLORIDE 0.4 MG/1
0.4 CAPSULE ORAL DAILY
Status: DISCONTINUED | OUTPATIENT
Start: 2018-05-22 | End: 2018-05-28 | Stop reason: HOSPADM

## 2018-05-22 RX ORDER — METOPROLOL TARTRATE 50 MG/1
50 TABLET, FILM COATED ORAL 2 TIMES DAILY
Status: DISCONTINUED | OUTPATIENT
Start: 2018-05-22 | End: 2018-05-28 | Stop reason: HOSPADM

## 2018-05-22 RX ORDER — ALBUTEROL SULFATE 2.5 MG/3ML
2.5 SOLUTION RESPIRATORY (INHALATION) 4 TIMES DAILY PRN
Status: DISCONTINUED | OUTPATIENT
Start: 2018-05-22 | End: 2018-05-28 | Stop reason: HOSPADM

## 2018-05-22 RX ORDER — L.ACID,PARA/B.BIFIDUM/S.THERM 8B CELL
1 CAPSULE ORAL DAILY
Status: DISCONTINUED | OUTPATIENT
Start: 2018-05-22 | End: 2018-05-28 | Stop reason: HOSPADM

## 2018-05-22 RX ORDER — ONDANSETRON 2 MG/ML
4 INJECTION INTRAMUSCULAR; INTRAVENOUS EVERY 6 HOURS PRN
Status: DISCONTINUED | OUTPATIENT
Start: 2018-05-22 | End: 2018-05-28 | Stop reason: HOSPADM

## 2018-05-22 RX ORDER — CITALOPRAM 20 MG/1
20 TABLET ORAL DAILY
Status: DISCONTINUED | OUTPATIENT
Start: 2018-05-22 | End: 2018-05-28 | Stop reason: HOSPADM

## 2018-05-22 RX ORDER — CEFTRIAXONE SODIUM 1 G/50ML
1 INJECTION, SOLUTION INTRAVENOUS ONCE
Status: COMPLETED | OUTPATIENT
Start: 2018-05-22 | End: 2018-05-22

## 2018-05-22 RX ORDER — MELATONIN
1000 2 TIMES DAILY
Status: DISCONTINUED | OUTPATIENT
Start: 2018-05-22 | End: 2018-05-28 | Stop reason: HOSPADM

## 2018-05-22 RX ORDER — METOPROLOL TARTRATE 50 MG/1
50 TABLET, FILM COATED ORAL ONCE
Status: COMPLETED | OUTPATIENT
Start: 2018-05-22 | End: 2018-05-22

## 2018-05-22 RX ORDER — ATORVASTATIN CALCIUM 10 MG/1
10 TABLET, FILM COATED ORAL NIGHTLY
Status: DISCONTINUED | OUTPATIENT
Start: 2018-05-22 | End: 2018-05-28 | Stop reason: HOSPADM

## 2018-05-22 RX ORDER — CASTOR OIL AND BALSAM, PERU 788; 87 MG/G; MG/G
OINTMENT TOPICAL EVERY 12 HOURS SCHEDULED
Status: DISCONTINUED | OUTPATIENT
Start: 2018-05-22 | End: 2018-05-28 | Stop reason: HOSPADM

## 2018-05-22 RX ORDER — FUROSEMIDE 20 MG/1
20 TABLET ORAL 2 TIMES DAILY
Status: DISCONTINUED | OUTPATIENT
Start: 2018-05-22 | End: 2018-05-22

## 2018-05-22 RX ORDER — FAMOTIDINE 20 MG/1
20 TABLET, FILM COATED ORAL DAILY
Status: DISCONTINUED | OUTPATIENT
Start: 2018-05-22 | End: 2018-05-28 | Stop reason: HOSPADM

## 2018-05-22 RX ORDER — SODIUM CHLORIDE 0.9 % (FLUSH) 0.9 %
1-10 SYRINGE (ML) INJECTION AS NEEDED
Status: DISCONTINUED | OUTPATIENT
Start: 2018-05-22 | End: 2018-05-28 | Stop reason: HOSPADM

## 2018-05-22 RX ORDER — FINASTERIDE 5 MG/1
5 TABLET, FILM COATED ORAL DAILY
Status: DISCONTINUED | OUTPATIENT
Start: 2018-05-22 | End: 2018-05-28 | Stop reason: HOSPADM

## 2018-05-22 RX ORDER — POTASSIUM CHLORIDE 750 MG/1
10 CAPSULE, EXTENDED RELEASE ORAL 2 TIMES DAILY WITH MEALS
Status: DISCONTINUED | OUTPATIENT
Start: 2018-05-22 | End: 2018-05-28 | Stop reason: HOSPADM

## 2018-05-22 RX ORDER — ASCORBIC ACID 500 MG
500 TABLET ORAL DAILY
Status: DISCONTINUED | OUTPATIENT
Start: 2018-05-22 | End: 2018-05-28 | Stop reason: HOSPADM

## 2018-05-22 RX ORDER — GUAIFENESIN 600 MG/1
600 TABLET, EXTENDED RELEASE ORAL 2 TIMES DAILY
Status: DISCONTINUED | OUTPATIENT
Start: 2018-05-22 | End: 2018-05-28 | Stop reason: HOSPADM

## 2018-05-22 RX ORDER — ACETAMINOPHEN 325 MG/1
650 TABLET ORAL EVERY 4 HOURS PRN
Status: DISCONTINUED | OUTPATIENT
Start: 2018-05-22 | End: 2018-05-28 | Stop reason: HOSPADM

## 2018-05-22 RX ORDER — CEFTRIAXONE SODIUM 1 G/50ML
1 INJECTION, SOLUTION INTRAVENOUS EVERY 24 HOURS
Status: DISCONTINUED | OUTPATIENT
Start: 2018-05-22 | End: 2018-05-23

## 2018-05-22 RX ORDER — ONDANSETRON 4 MG/1
4 TABLET, FILM COATED ORAL EVERY 6 HOURS PRN
Status: DISCONTINUED | OUTPATIENT
Start: 2018-05-22 | End: 2018-05-28 | Stop reason: HOSPADM

## 2018-05-22 RX ORDER — GABAPENTIN 400 MG/1
400 CAPSULE ORAL 3 TIMES DAILY
Status: DISCONTINUED | OUTPATIENT
Start: 2018-05-22 | End: 2018-05-28 | Stop reason: HOSPADM

## 2018-05-22 RX ORDER — NYSTATIN 100000 U/G
OINTMENT TOPICAL EVERY 12 HOURS SCHEDULED
Status: DISCONTINUED | OUTPATIENT
Start: 2018-05-22 | End: 2018-05-28 | Stop reason: HOSPADM

## 2018-05-22 RX ORDER — FUROSEMIDE 10 MG/ML
40 INJECTION INTRAMUSCULAR; INTRAVENOUS ONCE
Status: COMPLETED | OUTPATIENT
Start: 2018-05-22 | End: 2018-05-22

## 2018-05-22 RX ORDER — CALCIUM CARBONATE 200(500)MG
1 TABLET,CHEWABLE ORAL DAILY
Status: DISCONTINUED | OUTPATIENT
Start: 2018-05-22 | End: 2018-05-28 | Stop reason: HOSPADM

## 2018-05-22 RX ORDER — BISACODYL 5 MG/1
5 TABLET, DELAYED RELEASE ORAL DAILY PRN
Status: DISCONTINUED | OUTPATIENT
Start: 2018-05-22 | End: 2018-05-28 | Stop reason: HOSPADM

## 2018-05-22 RX ORDER — MULTIPLE VITAMINS W/ MINERALS TAB 9MG-400MCG
1 TAB ORAL DAILY
Status: DISCONTINUED | OUTPATIENT
Start: 2018-05-22 | End: 2018-05-28 | Stop reason: HOSPADM

## 2018-05-22 RX ORDER — FUROSEMIDE 10 MG/ML
40 INJECTION INTRAMUSCULAR; INTRAVENOUS
Status: DISCONTINUED | OUTPATIENT
Start: 2018-05-22 | End: 2018-05-24

## 2018-05-22 RX ORDER — DOXYCYCLINE 100 MG/1
100 CAPSULE ORAL EVERY 12 HOURS SCHEDULED
Status: DISCONTINUED | OUTPATIENT
Start: 2018-05-22 | End: 2018-05-28 | Stop reason: HOSPADM

## 2018-05-22 RX ORDER — LISINOPRIL 10 MG/1
10 TABLET ORAL DAILY
Status: DISCONTINUED | OUTPATIENT
Start: 2018-05-22 | End: 2018-05-28 | Stop reason: HOSPADM

## 2018-05-22 RX ORDER — IPRATROPIUM BROMIDE AND ALBUTEROL SULFATE 2.5; .5 MG/3ML; MG/3ML
3 SOLUTION RESPIRATORY (INHALATION)
Status: DISCONTINUED | OUTPATIENT
Start: 2018-05-22 | End: 2018-05-28 | Stop reason: HOSPADM

## 2018-05-22 RX ORDER — FAMOTIDINE 20 MG/1
20 TABLET, FILM COATED ORAL 2 TIMES DAILY PRN
Status: DISCONTINUED | OUTPATIENT
Start: 2018-05-22 | End: 2018-05-28 | Stop reason: HOSPADM

## 2018-05-22 RX ORDER — DOCUSATE SODIUM 100 MG/1
100 CAPSULE, LIQUID FILLED ORAL 2 TIMES DAILY PRN
Status: DISCONTINUED | OUTPATIENT
Start: 2018-05-22 | End: 2018-05-28 | Stop reason: HOSPADM

## 2018-05-22 RX ORDER — METHENAMINE HIPPURATE 1000 MG/1
1 TABLET ORAL 2 TIMES DAILY
Status: DISCONTINUED | OUTPATIENT
Start: 2018-05-22 | End: 2018-05-28 | Stop reason: HOSPADM

## 2018-05-22 RX ADMIN — DOXYCYCLINE 100 MG: 100 CAPSULE ORAL at 20:27

## 2018-05-22 RX ADMIN — METHENAMINE HIPPURATE 1 G: 1 TABLET ORAL at 10:09

## 2018-05-22 RX ADMIN — METOPROLOL TARTRATE 50 MG: 50 TABLET ORAL at 10:13

## 2018-05-22 RX ADMIN — Medication 1 TABLET: at 10:11

## 2018-05-22 RX ADMIN — GABAPENTIN 400 MG: 400 CAPSULE ORAL at 20:26

## 2018-05-22 RX ADMIN — VITAMIN D, TAB 1000IU (100/BT) 1000 UNITS: 25 TAB at 10:12

## 2018-05-22 RX ADMIN — FUROSEMIDE 40 MG: 10 INJECTION, SOLUTION INTRAMUSCULAR; INTRAVENOUS at 05:36

## 2018-05-22 RX ADMIN — CASTOR OIL AND BALSAM, PERU: 788; 87 OINTMENT TOPICAL at 20:27

## 2018-05-22 RX ADMIN — FINASTERIDE 5 MG: 5 TABLET, FILM COATED ORAL at 10:10

## 2018-05-22 RX ADMIN — GUAIFENESIN 600 MG: 600 TABLET, EXTENDED RELEASE ORAL at 10:13

## 2018-05-22 RX ADMIN — GABAPENTIN 400 MG: 400 CAPSULE ORAL at 17:21

## 2018-05-22 RX ADMIN — IPRATROPIUM BROMIDE AND ALBUTEROL SULFATE 3 ML: 2.5; .5 SOLUTION RESPIRATORY (INHALATION) at 17:02

## 2018-05-22 RX ADMIN — NYSTATIN: 100000 OINTMENT TOPICAL at 20:27

## 2018-05-22 RX ADMIN — MULTIPLE VITAMINS W/ MINERALS TAB 1 TABLET: TAB at 10:13

## 2018-05-22 RX ADMIN — IPRATROPIUM BROMIDE AND ALBUTEROL SULFATE 3 ML: 2.5; .5 SOLUTION RESPIRATORY (INHALATION) at 08:54

## 2018-05-22 RX ADMIN — METHENAMINE HIPPURATE 1 G: 1 TABLET ORAL at 20:26

## 2018-05-22 RX ADMIN — METOPROLOL TARTRATE 50 MG: 50 TABLET ORAL at 03:59

## 2018-05-22 RX ADMIN — FAMOTIDINE 20 MG: 20 TABLET ORAL at 10:12

## 2018-05-22 RX ADMIN — GUAIFENESIN 600 MG: 600 TABLET, EXTENDED RELEASE ORAL at 20:27

## 2018-05-22 RX ADMIN — FUROSEMIDE 40 MG: 10 INJECTION, SOLUTION INTRAMUSCULAR; INTRAVENOUS at 13:23

## 2018-05-22 RX ADMIN — IPRATROPIUM BROMIDE AND ALBUTEROL SULFATE 3 ML: 2.5; .5 SOLUTION RESPIRATORY (INHALATION) at 21:28

## 2018-05-22 RX ADMIN — ATORVASTATIN CALCIUM 10 MG: 10 TABLET, FILM COATED ORAL at 20:26

## 2018-05-22 RX ADMIN — TAMSULOSIN HYDROCHLORIDE 0.4 MG: 0.4 CAPSULE ORAL at 10:12

## 2018-05-22 RX ADMIN — NYSTATIN: 100000 OINTMENT TOPICAL at 10:10

## 2018-05-22 RX ADMIN — FUROSEMIDE 40 MG: 10 INJECTION, SOLUTION INTRAMUSCULAR; INTRAVENOUS at 17:21

## 2018-05-22 RX ADMIN — AZITHROMYCIN MONOHYDRATE 500 MG: 500 INJECTION, POWDER, LYOPHILIZED, FOR SOLUTION INTRAVENOUS at 01:21

## 2018-05-22 RX ADMIN — LISINOPRIL 10 MG: 10 TABLET ORAL at 10:12

## 2018-05-22 RX ADMIN — POTASSIUM CHLORIDE 10 MEQ: 750 CAPSULE, EXTENDED RELEASE ORAL at 17:21

## 2018-05-22 RX ADMIN — RIVAROXABAN 20 MG: 20 TABLET, FILM COATED ORAL at 10:13

## 2018-05-22 RX ADMIN — CEFTRIAXONE SODIUM 1 G: 1 INJECTION, SOLUTION INTRAVENOUS at 20:36

## 2018-05-22 RX ADMIN — CASTOR OIL AND BALSAM, PERU: 788; 87 OINTMENT TOPICAL at 13:30

## 2018-05-22 RX ADMIN — VITAMIN D, TAB 1000IU (100/BT) 1000 UNITS: 25 TAB at 20:27

## 2018-05-22 RX ADMIN — CEFTRIAXONE SODIUM 1 G: 1 INJECTION, SOLUTION INTRAVENOUS at 00:22

## 2018-05-22 RX ADMIN — METOPROLOL TARTRATE 50 MG: 50 TABLET ORAL at 20:26

## 2018-05-22 RX ADMIN — CITALOPRAM HYDROBROMIDE 20 MG: 20 TABLET ORAL at 10:13

## 2018-05-22 RX ADMIN — Medication 1 CAPSULE: at 10:11

## 2018-05-22 RX ADMIN — POTASSIUM CHLORIDE 10 MEQ: 750 CAPSULE, EXTENDED RELEASE ORAL at 10:11

## 2018-05-22 RX ADMIN — OXYCODONE HYDROCHLORIDE AND ACETAMINOPHEN 500 MG: 500 TABLET ORAL at 10:12

## 2018-05-22 RX ADMIN — GABAPENTIN 400 MG: 400 CAPSULE ORAL at 10:13

## 2018-05-23 PROBLEM — N39.0 UTI (URINARY TRACT INFECTION): Status: ACTIVE | Noted: 2018-05-23

## 2018-05-23 PROBLEM — Z86.718 HISTORY OF DVT (DEEP VEIN THROMBOSIS): Chronic | Status: ACTIVE | Noted: 2018-05-22

## 2018-05-23 LAB
ANION GAP SERPL CALCULATED.3IONS-SCNC: 3 MMOL/L (ref 3–11)
BUN BLD-MCNC: 25 MG/DL (ref 9–23)
BUN/CREAT SERPL: 31.3 (ref 7–25)
CALCIUM SPEC-SCNC: 8.4 MG/DL (ref 8.7–10.4)
CHLORIDE SERPL-SCNC: 98 MMOL/L (ref 99–109)
CO2 SERPL-SCNC: 39 MMOL/L (ref 20–31)
CREAT BLD-MCNC: 0.8 MG/DL (ref 0.6–1.3)
GFR SERPL CREATININE-BSD FRML MDRD: 92 ML/MIN/1.73
GLUCOSE BLD-MCNC: 160 MG/DL (ref 70–100)
GLUCOSE BLDC GLUCOMTR-MCNC: 132 MG/DL (ref 70–130)
GLUCOSE BLDC GLUCOMTR-MCNC: 138 MG/DL (ref 70–130)
GLUCOSE BLDC GLUCOMTR-MCNC: 149 MG/DL (ref 70–130)
GLUCOSE BLDC GLUCOMTR-MCNC: 94 MG/DL (ref 70–130)
POTASSIUM BLD-SCNC: 3.5 MMOL/L (ref 3.5–5.5)
SODIUM BLD-SCNC: 140 MMOL/L (ref 132–146)

## 2018-05-23 PROCEDURE — 97165 OT EVAL LOW COMPLEX 30 MIN: CPT

## 2018-05-23 PROCEDURE — 82962 GLUCOSE BLOOD TEST: CPT

## 2018-05-23 PROCEDURE — 87070 CULTURE OTHR SPECIMN AEROBIC: CPT | Performed by: NURSE PRACTITIONER

## 2018-05-23 PROCEDURE — 94640 AIRWAY INHALATION TREATMENT: CPT

## 2018-05-23 PROCEDURE — 99233 SBSQ HOSP IP/OBS HIGH 50: CPT | Performed by: FAMILY MEDICINE

## 2018-05-23 PROCEDURE — 87205 SMEAR GRAM STAIN: CPT | Performed by: NURSE PRACTITIONER

## 2018-05-23 PROCEDURE — 25010000002 FUROSEMIDE PER 20 MG: Performed by: NURSE PRACTITIONER

## 2018-05-23 PROCEDURE — 80048 BASIC METABOLIC PNL TOTAL CA: CPT | Performed by: FAMILY MEDICINE

## 2018-05-23 PROCEDURE — 94799 UNLISTED PULMONARY SVC/PX: CPT

## 2018-05-23 PROCEDURE — 97162 PT EVAL MOD COMPLEX 30 MIN: CPT

## 2018-05-23 RX ORDER — CEFUROXIME AXETIL 250 MG/1
500 TABLET ORAL EVERY 12 HOURS SCHEDULED
Status: DISCONTINUED | OUTPATIENT
Start: 2018-05-23 | End: 2018-05-26 | Stop reason: ALTCHOICE

## 2018-05-23 RX ADMIN — GABAPENTIN 400 MG: 400 CAPSULE ORAL at 20:12

## 2018-05-23 RX ADMIN — VITAMIN D, TAB 1000IU (100/BT) 1000 UNITS: 25 TAB at 20:14

## 2018-05-23 RX ADMIN — GUAIFENESIN 600 MG: 600 TABLET, EXTENDED RELEASE ORAL at 20:14

## 2018-05-23 RX ADMIN — METOPROLOL TARTRATE 50 MG: 50 TABLET ORAL at 09:32

## 2018-05-23 RX ADMIN — FINASTERIDE 5 MG: 5 TABLET, FILM COATED ORAL at 09:34

## 2018-05-23 RX ADMIN — POTASSIUM CHLORIDE 10 MEQ: 750 CAPSULE, EXTENDED RELEASE ORAL at 18:23

## 2018-05-23 RX ADMIN — DOXYCYCLINE 100 MG: 100 CAPSULE ORAL at 20:12

## 2018-05-23 RX ADMIN — TAMSULOSIN HYDROCHLORIDE 0.4 MG: 0.4 CAPSULE ORAL at 09:32

## 2018-05-23 RX ADMIN — GABAPENTIN 400 MG: 400 CAPSULE ORAL at 09:32

## 2018-05-23 RX ADMIN — ATORVASTATIN CALCIUM 10 MG: 10 TABLET, FILM COATED ORAL at 20:14

## 2018-05-23 RX ADMIN — CITALOPRAM HYDROBROMIDE 20 MG: 20 TABLET ORAL at 09:33

## 2018-05-23 RX ADMIN — METHENAMINE HIPPURATE 1 G: 1 TABLET ORAL at 20:18

## 2018-05-23 RX ADMIN — NYSTATIN: 100000 OINTMENT TOPICAL at 09:30

## 2018-05-23 RX ADMIN — RIVAROXABAN 20 MG: 20 TABLET, FILM COATED ORAL at 09:32

## 2018-05-23 RX ADMIN — IPRATROPIUM BROMIDE AND ALBUTEROL SULFATE 3 ML: 2.5; .5 SOLUTION RESPIRATORY (INHALATION) at 07:32

## 2018-05-23 RX ADMIN — METOPROLOL TARTRATE 50 MG: 50 TABLET ORAL at 20:12

## 2018-05-23 RX ADMIN — FAMOTIDINE 20 MG: 20 TABLET ORAL at 09:33

## 2018-05-23 RX ADMIN — CASTOR OIL AND BALSAM, PERU: 788; 87 OINTMENT TOPICAL at 09:30

## 2018-05-23 RX ADMIN — OXYCODONE HYDROCHLORIDE AND ACETAMINOPHEN 500 MG: 500 TABLET ORAL at 09:32

## 2018-05-23 RX ADMIN — Medication 1 CAPSULE: at 09:32

## 2018-05-23 RX ADMIN — NYSTATIN: 100000 OINTMENT TOPICAL at 20:18

## 2018-05-23 RX ADMIN — CEFUROXIME AXETIL 500 MG: 250 TABLET ORAL at 20:14

## 2018-05-23 RX ADMIN — CASTOR OIL AND BALSAM, PERU: 788; 87 OINTMENT TOPICAL at 20:18

## 2018-05-23 RX ADMIN — VITAMIN D, TAB 1000IU (100/BT) 1000 UNITS: 25 TAB at 09:33

## 2018-05-23 RX ADMIN — MULTIPLE VITAMINS W/ MINERALS TAB 1 TABLET: TAB at 09:32

## 2018-05-23 RX ADMIN — FUROSEMIDE 40 MG: 10 INJECTION, SOLUTION INTRAMUSCULAR; INTRAVENOUS at 18:23

## 2018-05-23 RX ADMIN — LISINOPRIL 10 MG: 10 TABLET ORAL at 09:32

## 2018-05-23 RX ADMIN — IPRATROPIUM BROMIDE AND ALBUTEROL SULFATE 3 ML: 2.5; .5 SOLUTION RESPIRATORY (INHALATION) at 12:00

## 2018-05-23 RX ADMIN — IPRATROPIUM BROMIDE AND ALBUTEROL SULFATE 3 ML: 2.5; .5 SOLUTION RESPIRATORY (INHALATION) at 20:00

## 2018-05-23 RX ADMIN — Medication 1 TABLET: at 09:33

## 2018-05-23 RX ADMIN — METHENAMINE HIPPURATE 1 G: 1 TABLET ORAL at 09:32

## 2018-05-23 RX ADMIN — GUAIFENESIN 600 MG: 600 TABLET, EXTENDED RELEASE ORAL at 09:32

## 2018-05-23 RX ADMIN — FUROSEMIDE 40 MG: 10 INJECTION, SOLUTION INTRAMUSCULAR; INTRAVENOUS at 09:31

## 2018-05-23 RX ADMIN — DOXYCYCLINE 100 MG: 100 CAPSULE ORAL at 09:31

## 2018-05-23 RX ADMIN — POTASSIUM CHLORIDE 10 MEQ: 750 CAPSULE, EXTENDED RELEASE ORAL at 09:32

## 2018-05-23 RX ADMIN — GABAPENTIN 400 MG: 400 CAPSULE ORAL at 16:59

## 2018-05-24 LAB
ANION GAP SERPL CALCULATED.3IONS-SCNC: 4 MMOL/L (ref 3–11)
BUN BLD-MCNC: 26 MG/DL (ref 9–23)
BUN/CREAT SERPL: 37.1 (ref 7–25)
CALCIUM SPEC-SCNC: 8.5 MG/DL (ref 8.7–10.4)
CHLORIDE SERPL-SCNC: 99 MMOL/L (ref 99–109)
CO2 SERPL-SCNC: 35 MMOL/L (ref 20–31)
CREAT BLD-MCNC: 0.7 MG/DL (ref 0.6–1.3)
GFR SERPL CREATININE-BSD FRML MDRD: 107 ML/MIN/1.73
GLUCOSE BLD-MCNC: 96 MG/DL (ref 70–100)
GLUCOSE BLDC GLUCOMTR-MCNC: 118 MG/DL (ref 70–130)
GLUCOSE BLDC GLUCOMTR-MCNC: 123 MG/DL (ref 70–130)
GLUCOSE BLDC GLUCOMTR-MCNC: 125 MG/DL (ref 70–130)
GLUCOSE BLDC GLUCOMTR-MCNC: 81 MG/DL (ref 70–130)
POTASSIUM BLD-SCNC: 3.5 MMOL/L (ref 3.5–5.5)
SODIUM BLD-SCNC: 138 MMOL/L (ref 132–146)

## 2018-05-24 PROCEDURE — 80048 BASIC METABOLIC PNL TOTAL CA: CPT | Performed by: FAMILY MEDICINE

## 2018-05-24 PROCEDURE — 94640 AIRWAY INHALATION TREATMENT: CPT

## 2018-05-24 PROCEDURE — 97164 PT RE-EVAL EST PLAN CARE: CPT

## 2018-05-24 PROCEDURE — 94799 UNLISTED PULMONARY SVC/PX: CPT

## 2018-05-24 PROCEDURE — 29581 APPL MULTLAYER CMPRN SYS LEG: CPT

## 2018-05-24 PROCEDURE — 99231 SBSQ HOSP IP/OBS SF/LOW 25: CPT | Performed by: INTERNAL MEDICINE

## 2018-05-24 PROCEDURE — 25010000002 FUROSEMIDE PER 20 MG: Performed by: NURSE PRACTITIONER

## 2018-05-24 PROCEDURE — 82962 GLUCOSE BLOOD TEST: CPT

## 2018-05-24 PROCEDURE — 99233 SBSQ HOSP IP/OBS HIGH 50: CPT | Performed by: FAMILY MEDICINE

## 2018-05-24 RX ORDER — FUROSEMIDE 20 MG/1
20 TABLET ORAL EVERY EVENING
Status: DISCONTINUED | OUTPATIENT
Start: 2018-05-24 | End: 2018-05-28 | Stop reason: HOSPADM

## 2018-05-24 RX ORDER — FUROSEMIDE 40 MG/1
40 TABLET ORAL EVERY MORNING
Status: DISCONTINUED | OUTPATIENT
Start: 2018-05-25 | End: 2018-05-28 | Stop reason: HOSPADM

## 2018-05-24 RX ADMIN — IPRATROPIUM BROMIDE AND ALBUTEROL SULFATE 3 ML: 2.5; .5 SOLUTION RESPIRATORY (INHALATION) at 08:58

## 2018-05-24 RX ADMIN — LISINOPRIL 10 MG: 10 TABLET ORAL at 09:16

## 2018-05-24 RX ADMIN — POTASSIUM CHLORIDE 10 MEQ: 750 CAPSULE, EXTENDED RELEASE ORAL at 17:06

## 2018-05-24 RX ADMIN — NYSTATIN: 100000 OINTMENT TOPICAL at 21:37

## 2018-05-24 RX ADMIN — DOXYCYCLINE 100 MG: 100 CAPSULE ORAL at 09:16

## 2018-05-24 RX ADMIN — VITAMIN D, TAB 1000IU (100/BT) 1000 UNITS: 25 TAB at 21:31

## 2018-05-24 RX ADMIN — FINASTERIDE 5 MG: 5 TABLET, FILM COATED ORAL at 09:17

## 2018-05-24 RX ADMIN — POTASSIUM CHLORIDE 10 MEQ: 750 CAPSULE, EXTENDED RELEASE ORAL at 09:15

## 2018-05-24 RX ADMIN — METHENAMINE HIPPURATE 1 G: 1 TABLET ORAL at 21:36

## 2018-05-24 RX ADMIN — IPRATROPIUM BROMIDE AND ALBUTEROL SULFATE 3 ML: 2.5; .5 SOLUTION RESPIRATORY (INHALATION) at 13:16

## 2018-05-24 RX ADMIN — NYSTATIN: 100000 OINTMENT TOPICAL at 09:14

## 2018-05-24 RX ADMIN — FUROSEMIDE 20 MG: 20 TABLET ORAL at 17:06

## 2018-05-24 RX ADMIN — DOXYCYCLINE 100 MG: 100 CAPSULE ORAL at 21:32

## 2018-05-24 RX ADMIN — CEFUROXIME AXETIL 500 MG: 250 TABLET ORAL at 09:15

## 2018-05-24 RX ADMIN — OXYCODONE HYDROCHLORIDE AND ACETAMINOPHEN 500 MG: 500 TABLET ORAL at 09:16

## 2018-05-24 RX ADMIN — METOPROLOL TARTRATE 50 MG: 50 TABLET ORAL at 09:17

## 2018-05-24 RX ADMIN — CASTOR OIL AND BALSAM, PERU: 788; 87 OINTMENT TOPICAL at 09:15

## 2018-05-24 RX ADMIN — RIVAROXABAN 20 MG: 20 TABLET, FILM COATED ORAL at 09:17

## 2018-05-24 RX ADMIN — CITALOPRAM HYDROBROMIDE 20 MG: 20 TABLET ORAL at 09:16

## 2018-05-24 RX ADMIN — GUAIFENESIN 600 MG: 600 TABLET, EXTENDED RELEASE ORAL at 09:16

## 2018-05-24 RX ADMIN — IPRATROPIUM BROMIDE AND ALBUTEROL SULFATE 3 ML: 2.5; .5 SOLUTION RESPIRATORY (INHALATION) at 19:58

## 2018-05-24 RX ADMIN — MULTIPLE VITAMINS W/ MINERALS TAB 1 TABLET: TAB at 09:17

## 2018-05-24 RX ADMIN — IPRATROPIUM BROMIDE AND ALBUTEROL SULFATE 3 ML: 2.5; .5 SOLUTION RESPIRATORY (INHALATION) at 16:55

## 2018-05-24 RX ADMIN — Medication 1 TABLET: at 09:15

## 2018-05-24 RX ADMIN — FUROSEMIDE 40 MG: 10 INJECTION, SOLUTION INTRAMUSCULAR; INTRAVENOUS at 09:15

## 2018-05-24 RX ADMIN — GUAIFENESIN 600 MG: 600 TABLET, EXTENDED RELEASE ORAL at 21:32

## 2018-05-24 RX ADMIN — Medication 1 CAPSULE: at 09:17

## 2018-05-24 RX ADMIN — ATORVASTATIN CALCIUM 10 MG: 10 TABLET, FILM COATED ORAL at 21:31

## 2018-05-24 RX ADMIN — CASTOR OIL AND BALSAM, PERU: 788; 87 OINTMENT TOPICAL at 21:36

## 2018-05-24 RX ADMIN — TAMSULOSIN HYDROCHLORIDE 0.4 MG: 0.4 CAPSULE ORAL at 09:17

## 2018-05-24 RX ADMIN — FAMOTIDINE 20 MG: 20 TABLET ORAL at 09:16

## 2018-05-24 RX ADMIN — GABAPENTIN 400 MG: 400 CAPSULE ORAL at 09:17

## 2018-05-24 RX ADMIN — GABAPENTIN 400 MG: 400 CAPSULE ORAL at 21:32

## 2018-05-24 RX ADMIN — GABAPENTIN 400 MG: 400 CAPSULE ORAL at 17:05

## 2018-05-24 RX ADMIN — CEFUROXIME AXETIL 500 MG: 250 TABLET ORAL at 21:32

## 2018-05-24 RX ADMIN — VITAMIN D, TAB 1000IU (100/BT) 1000 UNITS: 25 TAB at 09:17

## 2018-05-24 RX ADMIN — METOPROLOL TARTRATE 50 MG: 50 TABLET ORAL at 21:32

## 2018-05-24 RX ADMIN — METHENAMINE HIPPURATE 1 G: 1 TABLET ORAL at 09:41

## 2018-05-25 LAB
BACTERIA SPEC AEROBE CULT: ABNORMAL
BACTERIA SPEC AEROBE CULT: ABNORMAL
BACTERIA SPEC RESP CULT: NORMAL
GLUCOSE BLDC GLUCOMTR-MCNC: 170 MG/DL (ref 70–130)
GLUCOSE BLDC GLUCOMTR-MCNC: 77 MG/DL (ref 70–130)
GLUCOSE BLDC GLUCOMTR-MCNC: 87 MG/DL (ref 70–130)
GLUCOSE BLDC GLUCOMTR-MCNC: 94 MG/DL (ref 70–130)
GRAM STN SPEC: NORMAL

## 2018-05-25 PROCEDURE — 97530 THERAPEUTIC ACTIVITIES: CPT

## 2018-05-25 PROCEDURE — 94799 UNLISTED PULMONARY SVC/PX: CPT

## 2018-05-25 PROCEDURE — 82962 GLUCOSE BLOOD TEST: CPT

## 2018-05-25 PROCEDURE — 99232 SBSQ HOSP IP/OBS MODERATE 35: CPT | Performed by: NURSE PRACTITIONER

## 2018-05-25 PROCEDURE — 94760 N-INVAS EAR/PLS OXIMETRY 1: CPT

## 2018-05-25 PROCEDURE — 94640 AIRWAY INHALATION TREATMENT: CPT

## 2018-05-25 RX ADMIN — Medication 1 TABLET: at 08:54

## 2018-05-25 RX ADMIN — METHENAMINE HIPPURATE 1 G: 1 TABLET ORAL at 08:52

## 2018-05-25 RX ADMIN — ATORVASTATIN CALCIUM 10 MG: 10 TABLET, FILM COATED ORAL at 21:50

## 2018-05-25 RX ADMIN — METHENAMINE HIPPURATE 1 G: 1 TABLET ORAL at 21:50

## 2018-05-25 RX ADMIN — FUROSEMIDE 20 MG: 20 TABLET ORAL at 16:59

## 2018-05-25 RX ADMIN — NYSTATIN: 100000 OINTMENT TOPICAL at 08:52

## 2018-05-25 RX ADMIN — CITALOPRAM HYDROBROMIDE 20 MG: 20 TABLET ORAL at 08:53

## 2018-05-25 RX ADMIN — GABAPENTIN 400 MG: 400 CAPSULE ORAL at 21:50

## 2018-05-25 RX ADMIN — NYSTATIN: 100000 OINTMENT TOPICAL at 22:25

## 2018-05-25 RX ADMIN — Medication 1 CAPSULE: at 08:52

## 2018-05-25 RX ADMIN — IPRATROPIUM BROMIDE AND ALBUTEROL SULFATE 3 ML: 2.5; .5 SOLUTION RESPIRATORY (INHALATION) at 20:46

## 2018-05-25 RX ADMIN — POTASSIUM CHLORIDE 10 MEQ: 750 CAPSULE, EXTENDED RELEASE ORAL at 08:52

## 2018-05-25 RX ADMIN — FINASTERIDE 5 MG: 5 TABLET, FILM COATED ORAL at 08:52

## 2018-05-25 RX ADMIN — FAMOTIDINE 20 MG: 20 TABLET ORAL at 08:53

## 2018-05-25 RX ADMIN — RIVAROXABAN 20 MG: 20 TABLET, FILM COATED ORAL at 08:53

## 2018-05-25 RX ADMIN — MULTIPLE VITAMINS W/ MINERALS TAB 1 TABLET: TAB at 08:53

## 2018-05-25 RX ADMIN — FUROSEMIDE 40 MG: 40 TABLET ORAL at 06:33

## 2018-05-25 RX ADMIN — TAMSULOSIN HYDROCHLORIDE 0.4 MG: 0.4 CAPSULE ORAL at 08:53

## 2018-05-25 RX ADMIN — LISINOPRIL 10 MG: 10 TABLET ORAL at 08:52

## 2018-05-25 RX ADMIN — METOPROLOL TARTRATE 50 MG: 50 TABLET ORAL at 08:53

## 2018-05-25 RX ADMIN — VITAMIN D, TAB 1000IU (100/BT) 1000 UNITS: 25 TAB at 21:50

## 2018-05-25 RX ADMIN — IPRATROPIUM BROMIDE AND ALBUTEROL SULFATE 3 ML: 2.5; .5 SOLUTION RESPIRATORY (INHALATION) at 13:30

## 2018-05-25 RX ADMIN — GABAPENTIN 400 MG: 400 CAPSULE ORAL at 16:59

## 2018-05-25 RX ADMIN — GABAPENTIN 400 MG: 400 CAPSULE ORAL at 08:52

## 2018-05-25 RX ADMIN — IPRATROPIUM BROMIDE AND ALBUTEROL SULFATE 3 ML: 2.5; .5 SOLUTION RESPIRATORY (INHALATION) at 15:54

## 2018-05-25 RX ADMIN — POTASSIUM CHLORIDE 10 MEQ: 750 CAPSULE, EXTENDED RELEASE ORAL at 17:00

## 2018-05-25 RX ADMIN — CEFUROXIME AXETIL 500 MG: 250 TABLET ORAL at 21:50

## 2018-05-25 RX ADMIN — IPRATROPIUM BROMIDE AND ALBUTEROL SULFATE 3 ML: 2.5; .5 SOLUTION RESPIRATORY (INHALATION) at 08:53

## 2018-05-25 RX ADMIN — GUAIFENESIN 600 MG: 600 TABLET, EXTENDED RELEASE ORAL at 08:52

## 2018-05-25 RX ADMIN — CASTOR OIL AND BALSAM, PERU: 788; 87 OINTMENT TOPICAL at 22:25

## 2018-05-25 RX ADMIN — VITAMIN D, TAB 1000IU (100/BT) 1000 UNITS: 25 TAB at 08:52

## 2018-05-25 RX ADMIN — CASTOR OIL AND BALSAM, PERU: 788; 87 OINTMENT TOPICAL at 08:51

## 2018-05-25 RX ADMIN — OXYCODONE HYDROCHLORIDE AND ACETAMINOPHEN 500 MG: 500 TABLET ORAL at 08:53

## 2018-05-25 RX ADMIN — DOXYCYCLINE 100 MG: 100 CAPSULE ORAL at 08:54

## 2018-05-25 RX ADMIN — DOXYCYCLINE 100 MG: 100 CAPSULE ORAL at 21:50

## 2018-05-25 RX ADMIN — GUAIFENESIN 600 MG: 600 TABLET, EXTENDED RELEASE ORAL at 21:51

## 2018-05-25 RX ADMIN — CEFUROXIME AXETIL 500 MG: 250 TABLET ORAL at 08:53

## 2018-05-25 RX ADMIN — METOPROLOL TARTRATE 50 MG: 50 TABLET ORAL at 21:51

## 2018-05-26 LAB
GLUCOSE BLDC GLUCOMTR-MCNC: 125 MG/DL (ref 70–130)
GLUCOSE BLDC GLUCOMTR-MCNC: 174 MG/DL (ref 70–130)
GLUCOSE BLDC GLUCOMTR-MCNC: 88 MG/DL (ref 70–130)
GLUCOSE BLDC GLUCOMTR-MCNC: 89 MG/DL (ref 70–130)

## 2018-05-26 PROCEDURE — 25010000002 VANCOMYCIN 10 G RECONSTITUTED SOLUTION

## 2018-05-26 PROCEDURE — 94799 UNLISTED PULMONARY SVC/PX: CPT

## 2018-05-26 PROCEDURE — 94640 AIRWAY INHALATION TREATMENT: CPT

## 2018-05-26 PROCEDURE — 99233 SBSQ HOSP IP/OBS HIGH 50: CPT | Performed by: NURSE PRACTITIONER

## 2018-05-26 PROCEDURE — 82962 GLUCOSE BLOOD TEST: CPT

## 2018-05-26 RX ADMIN — METOPROLOL TARTRATE 50 MG: 50 TABLET ORAL at 21:27

## 2018-05-26 RX ADMIN — ATORVASTATIN CALCIUM 10 MG: 10 TABLET, FILM COATED ORAL at 21:27

## 2018-05-26 RX ADMIN — FUROSEMIDE 40 MG: 40 TABLET ORAL at 05:07

## 2018-05-26 RX ADMIN — IPRATROPIUM BROMIDE AND ALBUTEROL SULFATE 3 ML: 2.5; .5 SOLUTION RESPIRATORY (INHALATION) at 20:58

## 2018-05-26 RX ADMIN — IPRATROPIUM BROMIDE AND ALBUTEROL SULFATE 3 ML: 2.5; .5 SOLUTION RESPIRATORY (INHALATION) at 12:16

## 2018-05-26 RX ADMIN — METHENAMINE HIPPURATE 1 G: 1 TABLET ORAL at 21:27

## 2018-05-26 RX ADMIN — RIVAROXABAN 20 MG: 20 TABLET, FILM COATED ORAL at 10:26

## 2018-05-26 RX ADMIN — GABAPENTIN 400 MG: 400 CAPSULE ORAL at 10:25

## 2018-05-26 RX ADMIN — Medication 1 TABLET: at 09:00

## 2018-05-26 RX ADMIN — METHENAMINE HIPPURATE 1 G: 1 TABLET ORAL at 10:27

## 2018-05-26 RX ADMIN — GUAIFENESIN 600 MG: 600 TABLET, EXTENDED RELEASE ORAL at 10:25

## 2018-05-26 RX ADMIN — LISINOPRIL 10 MG: 10 TABLET ORAL at 10:26

## 2018-05-26 RX ADMIN — FUROSEMIDE 40 MG: 40 TABLET ORAL at 10:24

## 2018-05-26 RX ADMIN — MULTIPLE VITAMINS W/ MINERALS TAB 1 TABLET: TAB at 10:26

## 2018-05-26 RX ADMIN — Medication 1 CAPSULE: at 10:26

## 2018-05-26 RX ADMIN — VANCOMYCIN HYDROCHLORIDE 1750 MG: 10 INJECTION, POWDER, LYOPHILIZED, FOR SOLUTION INTRAVENOUS at 16:36

## 2018-05-26 RX ADMIN — VITAMIN D, TAB 1000IU (100/BT) 1000 UNITS: 25 TAB at 10:26

## 2018-05-26 RX ADMIN — NYSTATIN: 100000 OINTMENT TOPICAL at 09:41

## 2018-05-26 RX ADMIN — IPRATROPIUM BROMIDE AND ALBUTEROL SULFATE 3 ML: 2.5; .5 SOLUTION RESPIRATORY (INHALATION) at 15:53

## 2018-05-26 RX ADMIN — CASTOR OIL AND BALSAM, PERU: 788; 87 OINTMENT TOPICAL at 09:39

## 2018-05-26 RX ADMIN — FUROSEMIDE 20 MG: 20 TABLET ORAL at 17:17

## 2018-05-26 RX ADMIN — POTASSIUM CHLORIDE 10 MEQ: 750 CAPSULE, EXTENDED RELEASE ORAL at 10:26

## 2018-05-26 RX ADMIN — POTASSIUM CHLORIDE 10 MEQ: 750 CAPSULE, EXTENDED RELEASE ORAL at 17:17

## 2018-05-26 RX ADMIN — IPRATROPIUM BROMIDE AND ALBUTEROL SULFATE 3 ML: 2.5; .5 SOLUTION RESPIRATORY (INHALATION) at 08:15

## 2018-05-26 RX ADMIN — METOPROLOL TARTRATE 50 MG: 50 TABLET ORAL at 10:25

## 2018-05-26 RX ADMIN — NYSTATIN: 100000 OINTMENT TOPICAL at 21:28

## 2018-05-26 RX ADMIN — GUAIFENESIN 600 MG: 600 TABLET, EXTENDED RELEASE ORAL at 21:27

## 2018-05-26 RX ADMIN — OXYCODONE HYDROCHLORIDE AND ACETAMINOPHEN 500 MG: 500 TABLET ORAL at 10:26

## 2018-05-26 RX ADMIN — TAMSULOSIN HYDROCHLORIDE 0.4 MG: 0.4 CAPSULE ORAL at 10:25

## 2018-05-26 RX ADMIN — CASTOR OIL AND BALSAM, PERU: 788; 87 OINTMENT TOPICAL at 21:27

## 2018-05-26 RX ADMIN — DOXYCYCLINE 100 MG: 100 CAPSULE ORAL at 21:27

## 2018-05-26 RX ADMIN — FAMOTIDINE 20 MG: 20 TABLET ORAL at 10:25

## 2018-05-26 RX ADMIN — CEFUROXIME AXETIL 500 MG: 250 TABLET ORAL at 10:27

## 2018-05-26 RX ADMIN — GABAPENTIN 400 MG: 400 CAPSULE ORAL at 16:36

## 2018-05-26 RX ADMIN — FINASTERIDE 5 MG: 5 TABLET, FILM COATED ORAL at 10:27

## 2018-05-26 RX ADMIN — GABAPENTIN 400 MG: 400 CAPSULE ORAL at 21:27

## 2018-05-26 RX ADMIN — CITALOPRAM HYDROBROMIDE 20 MG: 20 TABLET ORAL at 10:25

## 2018-05-26 RX ADMIN — DOXYCYCLINE 100 MG: 100 CAPSULE ORAL at 10:27

## 2018-05-26 RX ADMIN — VITAMIN D, TAB 1000IU (100/BT) 1000 UNITS: 25 TAB at 21:27

## 2018-05-27 LAB
BACTERIA SPEC AEROBE CULT: NORMAL
BACTERIA SPEC AEROBE CULT: NORMAL
GLUCOSE BLDC GLUCOMTR-MCNC: 110 MG/DL (ref 70–130)
GLUCOSE BLDC GLUCOMTR-MCNC: 129 MG/DL (ref 70–130)
GLUCOSE BLDC GLUCOMTR-MCNC: 165 MG/DL (ref 70–130)
GLUCOSE BLDC GLUCOMTR-MCNC: 94 MG/DL (ref 70–130)

## 2018-05-27 PROCEDURE — 29581 APPL MULTLAYER CMPRN SYS LEG: CPT | Performed by: PHYSICAL THERAPIST

## 2018-05-27 PROCEDURE — 94760 N-INVAS EAR/PLS OXIMETRY 1: CPT

## 2018-05-27 PROCEDURE — 82962 GLUCOSE BLOOD TEST: CPT

## 2018-05-27 PROCEDURE — 99233 SBSQ HOSP IP/OBS HIGH 50: CPT | Performed by: INTERNAL MEDICINE

## 2018-05-27 PROCEDURE — 94799 UNLISTED PULMONARY SVC/PX: CPT

## 2018-05-27 PROCEDURE — 94640 AIRWAY INHALATION TREATMENT: CPT

## 2018-05-27 PROCEDURE — 25010000002 VANCOMYCIN

## 2018-05-27 PROCEDURE — 97110 THERAPEUTIC EXERCISES: CPT

## 2018-05-27 RX ADMIN — VITAMIN D, TAB 1000IU (100/BT) 1000 UNITS: 25 TAB at 20:25

## 2018-05-27 RX ADMIN — POTASSIUM CHLORIDE 10 MEQ: 750 CAPSULE, EXTENDED RELEASE ORAL at 08:39

## 2018-05-27 RX ADMIN — RIVAROXABAN 20 MG: 20 TABLET, FILM COATED ORAL at 08:39

## 2018-05-27 RX ADMIN — CITALOPRAM HYDROBROMIDE 20 MG: 20 TABLET ORAL at 08:39

## 2018-05-27 RX ADMIN — VITAMIN D, TAB 1000IU (100/BT) 1000 UNITS: 25 TAB at 08:39

## 2018-05-27 RX ADMIN — ATORVASTATIN CALCIUM 10 MG: 10 TABLET, FILM COATED ORAL at 20:25

## 2018-05-27 RX ADMIN — IPRATROPIUM BROMIDE AND ALBUTEROL SULFATE 3 ML: 2.5; .5 SOLUTION RESPIRATORY (INHALATION) at 16:23

## 2018-05-27 RX ADMIN — GABAPENTIN 400 MG: 400 CAPSULE ORAL at 17:00

## 2018-05-27 RX ADMIN — IPRATROPIUM BROMIDE AND ALBUTEROL SULFATE 3 ML: 2.5; .5 SOLUTION RESPIRATORY (INHALATION) at 08:46

## 2018-05-27 RX ADMIN — NYSTATIN: 100000 OINTMENT TOPICAL at 20:27

## 2018-05-27 RX ADMIN — METOPROLOL TARTRATE 50 MG: 50 TABLET ORAL at 20:25

## 2018-05-27 RX ADMIN — GABAPENTIN 400 MG: 400 CAPSULE ORAL at 20:25

## 2018-05-27 RX ADMIN — FAMOTIDINE 20 MG: 20 TABLET ORAL at 08:39

## 2018-05-27 RX ADMIN — Medication 1 CAPSULE: at 08:39

## 2018-05-27 RX ADMIN — DOXYCYCLINE 100 MG: 100 CAPSULE ORAL at 08:39

## 2018-05-27 RX ADMIN — IPRATROPIUM BROMIDE AND ALBUTEROL SULFATE 3 ML: 2.5; .5 SOLUTION RESPIRATORY (INHALATION) at 13:21

## 2018-05-27 RX ADMIN — OXYCODONE HYDROCHLORIDE AND ACETAMINOPHEN 500 MG: 500 TABLET ORAL at 08:39

## 2018-05-27 RX ADMIN — NYSTATIN: 100000 OINTMENT TOPICAL at 08:40

## 2018-05-27 RX ADMIN — METHENAMINE HIPPURATE 1 G: 1 TABLET ORAL at 20:25

## 2018-05-27 RX ADMIN — CASTOR OIL AND BALSAM, PERU: 788; 87 OINTMENT TOPICAL at 08:40

## 2018-05-27 RX ADMIN — POTASSIUM CHLORIDE 10 MEQ: 750 CAPSULE, EXTENDED RELEASE ORAL at 17:01

## 2018-05-27 RX ADMIN — METOPROLOL TARTRATE 50 MG: 50 TABLET ORAL at 08:39

## 2018-05-27 RX ADMIN — GABAPENTIN 400 MG: 400 CAPSULE ORAL at 08:39

## 2018-05-27 RX ADMIN — IPRATROPIUM BROMIDE AND ALBUTEROL SULFATE 3 ML: 2.5; .5 SOLUTION RESPIRATORY (INHALATION) at 21:00

## 2018-05-27 RX ADMIN — GUAIFENESIN 600 MG: 600 TABLET, EXTENDED RELEASE ORAL at 20:25

## 2018-05-27 RX ADMIN — Medication 1 TABLET: at 08:40

## 2018-05-27 RX ADMIN — TAMSULOSIN HYDROCHLORIDE 0.4 MG: 0.4 CAPSULE ORAL at 08:39

## 2018-05-27 RX ADMIN — VANCOMYCIN HYDROCHLORIDE 1750 MG: 10 INJECTION, POWDER, LYOPHILIZED, FOR SOLUTION INTRAVENOUS at 10:09

## 2018-05-27 RX ADMIN — METHENAMINE HIPPURATE 1 G: 1 TABLET ORAL at 08:39

## 2018-05-27 RX ADMIN — GUAIFENESIN 600 MG: 600 TABLET, EXTENDED RELEASE ORAL at 08:39

## 2018-05-27 RX ADMIN — FUROSEMIDE 40 MG: 40 TABLET ORAL at 05:23

## 2018-05-27 RX ADMIN — FUROSEMIDE 20 MG: 20 TABLET ORAL at 17:00

## 2018-05-27 RX ADMIN — CASTOR OIL AND BALSAM, PERU: 788; 87 OINTMENT TOPICAL at 20:27

## 2018-05-27 RX ADMIN — MULTIPLE VITAMINS W/ MINERALS TAB 1 TABLET: TAB at 08:39

## 2018-05-27 RX ADMIN — FINASTERIDE 5 MG: 5 TABLET, FILM COATED ORAL at 08:40

## 2018-05-27 RX ADMIN — DOXYCYCLINE 100 MG: 100 CAPSULE ORAL at 20:25

## 2018-05-27 RX ADMIN — LISINOPRIL 10 MG: 10 TABLET ORAL at 08:39

## 2018-05-28 VITALS
BODY MASS INDEX: 32.2 KG/M2 | RESPIRATION RATE: 18 BRPM | SYSTOLIC BLOOD PRESSURE: 114 MMHG | OXYGEN SATURATION: 97 % | DIASTOLIC BLOOD PRESSURE: 67 MMHG | WEIGHT: 250.88 LBS | HEART RATE: 79 BPM | HEIGHT: 74 IN | TEMPERATURE: 98.3 F

## 2018-05-28 PROBLEM — N39.0 UTI (URINARY TRACT INFECTION): Status: RESOLVED | Noted: 2018-05-23 | Resolved: 2018-05-28

## 2018-05-28 PROBLEM — J96.01 ACUTE RESPIRATORY FAILURE WITH HYPOXIA (HCC): Status: RESOLVED | Noted: 2018-05-22 | Resolved: 2018-05-28

## 2018-05-28 PROBLEM — J18.9 PNEUMONIA: Status: RESOLVED | Noted: 2018-05-22 | Resolved: 2018-05-28

## 2018-05-28 PROBLEM — I50.33 ACUTE ON CHRONIC DIASTOLIC CONGESTIVE HEART FAILURE (HCC): Status: RESOLVED | Noted: 2018-05-22 | Resolved: 2018-05-28

## 2018-05-28 LAB
GLUCOSE BLDC GLUCOMTR-MCNC: 140 MG/DL (ref 70–130)
GLUCOSE BLDC GLUCOMTR-MCNC: 90 MG/DL (ref 70–130)

## 2018-05-28 PROCEDURE — 94640 AIRWAY INHALATION TREATMENT: CPT

## 2018-05-28 PROCEDURE — 97530 THERAPEUTIC ACTIVITIES: CPT

## 2018-05-28 PROCEDURE — 99239 HOSP IP/OBS DSCHRG MGMT >30: CPT | Performed by: NURSE PRACTITIONER

## 2018-05-28 PROCEDURE — 25010000002 VANCOMYCIN 10 G RECONSTITUTED SOLUTION

## 2018-05-28 PROCEDURE — 82962 GLUCOSE BLOOD TEST: CPT

## 2018-05-28 PROCEDURE — 94760 N-INVAS EAR/PLS OXIMETRY 1: CPT

## 2018-05-28 RX ORDER — L.ACID,PARA/B.BIFIDUM/S.THERM 8B CELL
1 CAPSULE ORAL DAILY
Start: 2018-05-29

## 2018-05-28 RX ORDER — LIDOCAINE 50 MG/G
1 PATCH TOPICAL
Status: DISCONTINUED | OUTPATIENT
Start: 2018-05-28 | End: 2018-05-28 | Stop reason: HOSPADM

## 2018-05-28 RX ORDER — GABAPENTIN 400 MG/1
400 CAPSULE ORAL 3 TIMES DAILY
Qty: 9 CAPSULE | Refills: 0 | Status: SHIPPED | OUTPATIENT
Start: 2018-05-28

## 2018-05-28 RX ORDER — GABAPENTIN 400 MG/1
400 CAPSULE ORAL 3 TIMES DAILY
Qty: 9 CAPSULE | Refills: 0 | Status: SHIPPED | OUTPATIENT
Start: 2018-05-28 | End: 2018-05-28

## 2018-05-28 RX ORDER — CASTOR OIL AND BALSAM, PERU 788; 87 MG/G; MG/G
1 OINTMENT TOPICAL EVERY 12 HOURS SCHEDULED
Start: 2018-05-28

## 2018-05-28 RX ORDER — FUROSEMIDE 40 MG/1
40 TABLET ORAL EVERY MORNING
Start: 2018-05-29

## 2018-05-28 RX ORDER — ACETAMINOPHEN 325 MG/1
650 TABLET ORAL EVERY 4 HOURS PRN
Start: 2018-05-28

## 2018-05-28 RX ORDER — DOXYCYCLINE 100 MG/1
100 CAPSULE ORAL EVERY 12 HOURS SCHEDULED
Qty: 6 CAPSULE | Refills: 0 | Status: SHIPPED | OUTPATIENT
Start: 2018-05-28 | End: 2018-05-31

## 2018-05-28 RX ORDER — PSEUDOEPHEDRINE HCL 30 MG
100 TABLET ORAL 2 TIMES DAILY PRN
Start: 2018-05-28

## 2018-05-28 RX ORDER — IPRATROPIUM BROMIDE AND ALBUTEROL SULFATE 2.5; .5 MG/3ML; MG/3ML
3 SOLUTION RESPIRATORY (INHALATION) 3 TIMES DAILY
Qty: 360 ML
Start: 2018-05-28

## 2018-05-28 RX ORDER — LIDOCAINE 50 MG/G
1 PATCH TOPICAL
Start: 2018-05-28

## 2018-05-28 RX ORDER — FUROSEMIDE 20 MG/1
20 TABLET ORAL EVERY EVENING
Start: 2018-05-28

## 2018-05-28 RX ADMIN — LISINOPRIL 10 MG: 10 TABLET ORAL at 08:39

## 2018-05-28 RX ADMIN — FUROSEMIDE 40 MG: 40 TABLET ORAL at 06:28

## 2018-05-28 RX ADMIN — MULTIPLE VITAMINS W/ MINERALS TAB 1 TABLET: TAB at 08:39

## 2018-05-28 RX ADMIN — Medication 1 TABLET: at 08:39

## 2018-05-28 RX ADMIN — GABAPENTIN 400 MG: 400 CAPSULE ORAL at 08:40

## 2018-05-28 RX ADMIN — FAMOTIDINE 20 MG: 20 TABLET ORAL at 08:40

## 2018-05-28 RX ADMIN — RIVAROXABAN 20 MG: 20 TABLET, FILM COATED ORAL at 08:39

## 2018-05-28 RX ADMIN — DOXYCYCLINE 100 MG: 100 CAPSULE ORAL at 08:39

## 2018-05-28 RX ADMIN — VANCOMYCIN HYDROCHLORIDE 1750 MG: 10 INJECTION, POWDER, LYOPHILIZED, FOR SOLUTION INTRAVENOUS at 08:42

## 2018-05-28 RX ADMIN — METHENAMINE HIPPURATE 1 G: 1 TABLET ORAL at 08:40

## 2018-05-28 RX ADMIN — CASTOR OIL AND BALSAM, PERU: 788; 87 OINTMENT TOPICAL at 08:38

## 2018-05-28 RX ADMIN — OXYCODONE HYDROCHLORIDE AND ACETAMINOPHEN 500 MG: 500 TABLET ORAL at 08:40

## 2018-05-28 RX ADMIN — FINASTERIDE 5 MG: 5 TABLET, FILM COATED ORAL at 08:41

## 2018-05-28 RX ADMIN — IPRATROPIUM BROMIDE AND ALBUTEROL SULFATE 3 ML: 2.5; .5 SOLUTION RESPIRATORY (INHALATION) at 08:57

## 2018-05-28 RX ADMIN — POTASSIUM CHLORIDE 10 MEQ: 750 CAPSULE, EXTENDED RELEASE ORAL at 08:40

## 2018-05-28 RX ADMIN — VITAMIN D, TAB 1000IU (100/BT) 1000 UNITS: 25 TAB at 08:40

## 2018-05-28 RX ADMIN — TAMSULOSIN HYDROCHLORIDE 0.4 MG: 0.4 CAPSULE ORAL at 08:40

## 2018-05-28 RX ADMIN — NYSTATIN: 100000 OINTMENT TOPICAL at 08:39

## 2018-05-28 RX ADMIN — CITALOPRAM HYDROBROMIDE 20 MG: 20 TABLET ORAL at 08:40

## 2018-05-28 RX ADMIN — LIDOCAINE 1 PATCH: 50 PATCH CUTANEOUS at 10:03

## 2018-05-28 RX ADMIN — METOPROLOL TARTRATE 50 MG: 50 TABLET ORAL at 08:40

## 2018-05-28 RX ADMIN — Medication 1 CAPSULE: at 08:40

## 2018-05-28 RX ADMIN — GUAIFENESIN 600 MG: 600 TABLET, EXTENDED RELEASE ORAL at 08:39

## 2018-05-29 ENCOUNTER — DOCUMENTATION (OUTPATIENT)
Dept: CARDIAC REHAB | Facility: HOSPITAL | Age: 83
End: 2018-05-29

## 2018-05-30 RX ORDER — CITALOPRAM 20 MG/1
TABLET ORAL
Qty: 90 TABLET | Refills: 1 | Status: SHIPPED | OUTPATIENT
Start: 2018-05-30

## 2025-04-14 NOTE — TELEPHONE ENCOUNTER
Denys Fay   HR is less than 60   I discontinue atenolol , Keep monitoring   Follow-up with cardiology  Follow-up with the echo